# Patient Record
Sex: MALE | Race: WHITE | NOT HISPANIC OR LATINO | Employment: FULL TIME | URBAN - METROPOLITAN AREA
[De-identification: names, ages, dates, MRNs, and addresses within clinical notes are randomized per-mention and may not be internally consistent; named-entity substitution may affect disease eponyms.]

---

## 2020-11-03 ENCOUNTER — OCCMED (OUTPATIENT)
Dept: URGENT CARE | Facility: CLINIC | Age: 55
End: 2020-11-03

## 2020-11-03 DIAGNOSIS — Z11.59 SCREENING FOR VIRAL DISEASE: Primary | ICD-10-CM

## 2020-11-03 PROCEDURE — U0003 INFECTIOUS AGENT DETECTION BY NUCLEIC ACID (DNA OR RNA); SEVERE ACUTE RESPIRATORY SYNDROME CORONAVIRUS 2 (SARS-COV-2) (CORONAVIRUS DISEASE [COVID-19]), AMPLIFIED PROBE TECHNIQUE, MAKING USE OF HIGH THROUGHPUT TECHNOLOGIES AS DESCRIBED BY CMS-2020-01-R: HCPCS

## 2020-11-05 LAB — SARS-COV-2 RNA SPEC QL NAA+PROBE: NOT DETECTED

## 2023-03-22 ENCOUNTER — OCCMED (OUTPATIENT)
Dept: URGENT CARE | Facility: CLINIC | Age: 58
End: 2023-03-22

## 2023-03-22 ENCOUNTER — APPOINTMENT (OUTPATIENT)
Dept: RADIOLOGY | Facility: CLINIC | Age: 58
End: 2023-03-22

## 2023-03-22 DIAGNOSIS — M79.605 LEFT LEG PAIN: ICD-10-CM

## 2023-03-22 DIAGNOSIS — M79.662 PAIN OF LEFT CALF: ICD-10-CM

## 2023-03-22 DIAGNOSIS — M79.605 LEFT LEG PAIN: Primary | ICD-10-CM

## 2023-03-29 ENCOUNTER — OCCMED (OUTPATIENT)
Dept: URGENT CARE | Facility: CLINIC | Age: 58
End: 2023-03-29

## 2023-03-29 DIAGNOSIS — M79.605 LEFT LEG PAIN: Primary | ICD-10-CM

## 2024-07-13 ENCOUNTER — APPOINTMENT (EMERGENCY)
Dept: CT IMAGING | Facility: HOSPITAL | Age: 59
DRG: 696 | End: 2024-07-13
Payer: COMMERCIAL

## 2024-07-13 ENCOUNTER — HOSPITAL ENCOUNTER (INPATIENT)
Facility: HOSPITAL | Age: 59
LOS: 1 days | Discharge: HOME | DRG: 696 | End: 2024-07-16
Attending: EMERGENCY MEDICINE | Admitting: FAMILY MEDICINE
Payer: COMMERCIAL

## 2024-07-13 DIAGNOSIS — R33.9 URINARY RETENTION: ICD-10-CM

## 2024-07-13 DIAGNOSIS — R31.9 HEMATURIA: Primary | ICD-10-CM

## 2024-07-13 DIAGNOSIS — N39.0 UTI (URINARY TRACT INFECTION): ICD-10-CM

## 2024-07-13 DIAGNOSIS — F17.200 TOBACCO USE DISORDER: ICD-10-CM

## 2024-07-13 DIAGNOSIS — R10.32 LEFT LOWER QUADRANT ABDOMINAL PAIN: ICD-10-CM

## 2024-07-13 PROBLEM — R33.8 BENIGN PROSTATIC HYPERPLASIA WITH URINARY RETENTION: Status: ACTIVE | Noted: 2024-07-13

## 2024-07-13 PROBLEM — E78.00 PURE HYPERCHOLESTEROLEMIA: Status: ACTIVE | Noted: 2023-06-13

## 2024-07-13 PROBLEM — Z95.5 PRESENCE OF STENT IN CORONARY ARTERY: Status: ACTIVE | Noted: 2020-07-01

## 2024-07-13 PROBLEM — R31.0 GROSS HEMATURIA: Status: ACTIVE | Noted: 2023-07-01

## 2024-07-13 PROBLEM — R10.9 ACUTE LEFT FLANK PAIN: Status: ACTIVE | Noted: 2023-07-01

## 2024-07-13 PROBLEM — E55.9 VITAMIN D DEFICIENCY: Status: ACTIVE | Noted: 2020-07-02

## 2024-07-13 PROBLEM — N40.1 BENIGN PROSTATIC HYPERPLASIA WITH URINARY RETENTION: Status: ACTIVE | Noted: 2024-07-13

## 2024-07-13 PROBLEM — I25.5 ISCHEMIC CARDIOMYOPATHY: Status: ACTIVE | Noted: 2018-10-29

## 2024-07-13 PROBLEM — I50.42 CHRONIC COMBINED SYSTOLIC AND DIASTOLIC HEART FAILURE (HCC): Status: ACTIVE | Noted: 2022-11-29

## 2024-07-13 LAB
ABO GROUP BLD: NORMAL
ABO GROUP BLD: NORMAL
ALBUMIN SERPL BCG-MCNC: 4.5 G/DL (ref 3.5–5)
ALP SERPL-CCNC: 59 U/L (ref 34–104)
ALT SERPL W P-5'-P-CCNC: 12 U/L (ref 7–52)
ANION GAP SERPL CALCULATED.3IONS-SCNC: 6 MMOL/L (ref 4–13)
ANION GAP SERPL CALCULATED.3IONS-SCNC: 9 MMOL/L (ref 4–13)
APTT PPP: 27 SECONDS (ref 23–37)
AST SERPL W P-5'-P-CCNC: 13 U/L (ref 13–39)
BACTERIA UR QL AUTO: ABNORMAL /HPF
BASOPHILS # BLD AUTO: 0.04 THOUSANDS/ÂΜL (ref 0–0.1)
BASOPHILS NFR BLD AUTO: 0 % (ref 0–1)
BILIRUB SERPL-MCNC: 0.39 MG/DL (ref 0.2–1)
BILIRUB UR QL STRIP: NEGATIVE
BLD GP AB SCN SERPL QL: NEGATIVE
BUN SERPL-MCNC: 18 MG/DL (ref 5–25)
BUN SERPL-MCNC: 19 MG/DL (ref 5–25)
CALCIUM SERPL-MCNC: 8.6 MG/DL (ref 8.4–10.2)
CALCIUM SERPL-MCNC: 9.2 MG/DL (ref 8.4–10.2)
CHLORIDE SERPL-SCNC: 105 MMOL/L (ref 96–108)
CHLORIDE SERPL-SCNC: 110 MMOL/L (ref 96–108)
CLARITY UR: ABNORMAL
CO2 SERPL-SCNC: 23 MMOL/L (ref 21–32)
CO2 SERPL-SCNC: 23 MMOL/L (ref 21–32)
COLOR UR: ABNORMAL
CREAT SERPL-MCNC: 0.94 MG/DL (ref 0.6–1.3)
CREAT SERPL-MCNC: 0.95 MG/DL (ref 0.6–1.3)
EOSINOPHIL # BLD AUTO: 0.23 THOUSAND/ÂΜL (ref 0–0.61)
EOSINOPHIL NFR BLD AUTO: 3 % (ref 0–6)
ERYTHROCYTE [DISTWIDTH] IN BLOOD BY AUTOMATED COUNT: 12.4 % (ref 11.6–15.1)
ERYTHROCYTE [DISTWIDTH] IN BLOOD BY AUTOMATED COUNT: 12.5 % (ref 11.6–15.1)
GFR SERPL CREATININE-BSD FRML MDRD: 87 ML/MIN/1.73SQ M
GFR SERPL CREATININE-BSD FRML MDRD: 88 ML/MIN/1.73SQ M
GLUCOSE SERPL-MCNC: 143 MG/DL (ref 65–140)
GLUCOSE SERPL-MCNC: 160 MG/DL (ref 65–140)
GLUCOSE SERPL-MCNC: 162 MG/DL (ref 65–140)
GLUCOSE SERPL-MCNC: 171 MG/DL (ref 65–140)
GLUCOSE SERPL-MCNC: 243 MG/DL (ref 65–140)
GLUCOSE UR STRIP-MCNC: ABNORMAL MG/DL
HCT VFR BLD AUTO: 36.9 % (ref 36.5–49.3)
HCT VFR BLD AUTO: 42.4 % (ref 36.5–49.3)
HGB BLD-MCNC: 12.5 G/DL (ref 12–17)
HGB BLD-MCNC: 14.2 G/DL (ref 12–17)
HGB UR QL STRIP.AUTO: ABNORMAL
IMM GRANULOCYTES # BLD AUTO: 0.02 THOUSAND/UL (ref 0–0.2)
IMM GRANULOCYTES NFR BLD AUTO: 0 % (ref 0–2)
INR PPP: 0.91 (ref 0.84–1.19)
KETONES UR STRIP-MCNC: ABNORMAL MG/DL
LACTATE SERPL-SCNC: 2 MMOL/L (ref 0.5–2)
LEUKOCYTE ESTERASE UR QL STRIP: ABNORMAL
LIPASE SERPL-CCNC: 44 U/L (ref 11–82)
LYMPHOCYTES # BLD AUTO: 3.47 THOUSANDS/ÂΜL (ref 0.6–4.47)
LYMPHOCYTES NFR BLD AUTO: 37 % (ref 14–44)
MCH RBC QN AUTO: 29.8 PG (ref 26.8–34.3)
MCH RBC QN AUTO: 30 PG (ref 26.8–34.3)
MCHC RBC AUTO-ENTMCNC: 33.5 G/DL (ref 31.4–37.4)
MCHC RBC AUTO-ENTMCNC: 33.9 G/DL (ref 31.4–37.4)
MCV RBC AUTO: 89 FL (ref 82–98)
MCV RBC AUTO: 89 FL (ref 82–98)
MONOCYTES # BLD AUTO: 0.68 THOUSAND/ÂΜL (ref 0.17–1.22)
MONOCYTES NFR BLD AUTO: 7 % (ref 4–12)
NEUTROPHILS # BLD AUTO: 4.93 THOUSANDS/ÂΜL (ref 1.85–7.62)
NEUTS SEG NFR BLD AUTO: 53 % (ref 43–75)
NITRITE UR QL STRIP: POSITIVE
NON-SQ EPI CELLS URNS QL MICRO: ABNORMAL /HPF
NRBC BLD AUTO-RTO: 0 /100 WBCS
PH UR STRIP.AUTO: 7 [PH]
PLATELET # BLD AUTO: 159 THOUSANDS/UL (ref 149–390)
PLATELET # BLD AUTO: 209 THOUSANDS/UL (ref 149–390)
PMV BLD AUTO: 10.5 FL (ref 8.9–12.7)
PMV BLD AUTO: 11.1 FL (ref 8.9–12.7)
POTASSIUM SERPL-SCNC: 3.9 MMOL/L (ref 3.5–5.3)
POTASSIUM SERPL-SCNC: 4 MMOL/L (ref 3.5–5.3)
PROT SERPL-MCNC: 7.4 G/DL (ref 6.4–8.4)
PROT UR STRIP-MCNC: >=300 MG/DL
PROTHROMBIN TIME: 12.6 SECONDS (ref 11.6–14.5)
RBC # BLD AUTO: 4.17 MILLION/UL (ref 3.88–5.62)
RBC # BLD AUTO: 4.76 MILLION/UL (ref 3.88–5.62)
RBC #/AREA URNS AUTO: ABNORMAL /HPF
RH BLD: POSITIVE
RH BLD: POSITIVE
SODIUM SERPL-SCNC: 137 MMOL/L (ref 135–147)
SODIUM SERPL-SCNC: 139 MMOL/L (ref 135–147)
SP GR UR STRIP.AUTO: 1.01 (ref 1–1.03)
SPECIMEN EXPIRATION DATE: NORMAL
UROBILINOGEN UR QL STRIP.AUTO: >=8 E.U./DL
WBC # BLD AUTO: 10.44 THOUSAND/UL (ref 4.31–10.16)
WBC # BLD AUTO: 9.37 THOUSAND/UL (ref 4.31–10.16)
WBC #/AREA URNS AUTO: ABNORMAL /HPF

## 2024-07-13 PROCEDURE — 80053 COMPREHEN METABOLIC PANEL: CPT | Performed by: EMERGENCY MEDICINE

## 2024-07-13 PROCEDURE — 82948 REAGENT STRIP/BLOOD GLUCOSE: CPT

## 2024-07-13 PROCEDURE — 74176 CT ABD & PELVIS W/O CONTRAST: CPT

## 2024-07-13 PROCEDURE — 96365 THER/PROPH/DIAG IV INF INIT: CPT

## 2024-07-13 PROCEDURE — 0T9B70Z DRAINAGE OF BLADDER WITH DRAINAGE DEVICE, VIA NATURAL OR ARTIFICIAL OPENING: ICD-10-PCS | Performed by: EMERGENCY MEDICINE

## 2024-07-13 PROCEDURE — 85610 PROTHROMBIN TIME: CPT | Performed by: EMERGENCY MEDICINE

## 2024-07-13 PROCEDURE — 96361 HYDRATE IV INFUSION ADD-ON: CPT

## 2024-07-13 PROCEDURE — 86850 RBC ANTIBODY SCREEN: CPT | Performed by: EMERGENCY MEDICINE

## 2024-07-13 PROCEDURE — 36415 COLL VENOUS BLD VENIPUNCTURE: CPT | Performed by: EMERGENCY MEDICINE

## 2024-07-13 PROCEDURE — 96376 TX/PRO/DX INJ SAME DRUG ADON: CPT

## 2024-07-13 PROCEDURE — 99222 1ST HOSP IP/OBS MODERATE 55: CPT | Performed by: FAMILY MEDICINE

## 2024-07-13 PROCEDURE — 86901 BLOOD TYPING SEROLOGIC RH(D): CPT | Performed by: EMERGENCY MEDICINE

## 2024-07-13 PROCEDURE — 99284 EMERGENCY DEPT VISIT MOD MDM: CPT

## 2024-07-13 PROCEDURE — 83690 ASSAY OF LIPASE: CPT | Performed by: EMERGENCY MEDICINE

## 2024-07-13 PROCEDURE — 81001 URINALYSIS AUTO W/SCOPE: CPT | Performed by: EMERGENCY MEDICINE

## 2024-07-13 PROCEDURE — 80048 BASIC METABOLIC PNL TOTAL CA: CPT | Performed by: FAMILY MEDICINE

## 2024-07-13 PROCEDURE — 99285 EMERGENCY DEPT VISIT HI MDM: CPT | Performed by: EMERGENCY MEDICINE

## 2024-07-13 PROCEDURE — 86900 BLOOD TYPING SEROLOGIC ABO: CPT | Performed by: EMERGENCY MEDICINE

## 2024-07-13 PROCEDURE — 99222 1ST HOSP IP/OBS MODERATE 55: CPT

## 2024-07-13 PROCEDURE — 85730 THROMBOPLASTIN TIME PARTIAL: CPT | Performed by: EMERGENCY MEDICINE

## 2024-07-13 PROCEDURE — 85027 COMPLETE CBC AUTOMATED: CPT | Performed by: FAMILY MEDICINE

## 2024-07-13 PROCEDURE — 87086 URINE CULTURE/COLONY COUNT: CPT | Performed by: EMERGENCY MEDICINE

## 2024-07-13 PROCEDURE — 83605 ASSAY OF LACTIC ACID: CPT | Performed by: EMERGENCY MEDICINE

## 2024-07-13 PROCEDURE — 96375 TX/PRO/DX INJ NEW DRUG ADDON: CPT

## 2024-07-13 PROCEDURE — 85025 COMPLETE CBC W/AUTO DIFF WBC: CPT | Performed by: EMERGENCY MEDICINE

## 2024-07-13 RX ORDER — MORPHINE SULFATE 4 MG/ML
4 INJECTION, SOLUTION INTRAMUSCULAR; INTRAVENOUS EVERY 4 HOURS PRN
Status: DISCONTINUED | OUTPATIENT
Start: 2024-07-13 | End: 2024-07-16 | Stop reason: HOSPADM

## 2024-07-13 RX ORDER — OXYCODONE HYDROCHLORIDE 5 MG/1
5 TABLET ORAL EVERY 6 HOURS PRN
Status: DISCONTINUED | OUTPATIENT
Start: 2024-07-13 | End: 2024-07-16 | Stop reason: HOSPADM

## 2024-07-13 RX ORDER — ASPIRIN 81 MG/1
81 TABLET ORAL 2 TIMES DAILY
COMMUNITY
End: 2024-07-16

## 2024-07-13 RX ORDER — CEFAZOLIN SODIUM 1 G/50ML
1000 SOLUTION INTRAVENOUS ONCE
Status: COMPLETED | OUTPATIENT
Start: 2024-07-13 | End: 2024-07-13

## 2024-07-13 RX ORDER — FINASTERIDE 5 MG/1
5 TABLET, FILM COATED ORAL DAILY
Status: DISCONTINUED | OUTPATIENT
Start: 2024-07-13 | End: 2024-07-16 | Stop reason: HOSPADM

## 2024-07-13 RX ORDER — GABAPENTIN 100 MG/1
100 CAPSULE ORAL DAILY
COMMUNITY
Start: 2024-04-23

## 2024-07-13 RX ORDER — INSULIN LISPRO 100 [IU]/ML
1-6 INJECTION, SOLUTION INTRAVENOUS; SUBCUTANEOUS
Status: DISCONTINUED | OUTPATIENT
Start: 2024-07-13 | End: 2024-07-16 | Stop reason: HOSPADM

## 2024-07-13 RX ORDER — CARVEDILOL 25 MG/1
25 TABLET ORAL 2 TIMES DAILY WITH MEALS
COMMUNITY
Start: 2024-04-23

## 2024-07-13 RX ORDER — MORPHINE SULFATE 4 MG/ML
4 INJECTION, SOLUTION INTRAMUSCULAR; INTRAVENOUS ONCE
Status: COMPLETED | OUTPATIENT
Start: 2024-07-13 | End: 2024-07-13

## 2024-07-13 RX ORDER — TAMSULOSIN HYDROCHLORIDE 0.4 MG/1
0.4 CAPSULE ORAL ONCE
Status: COMPLETED | OUTPATIENT
Start: 2024-07-13 | End: 2024-07-13

## 2024-07-13 RX ORDER — CARVEDILOL 12.5 MG/1
25 TABLET ORAL 2 TIMES DAILY WITH MEALS
Status: DISCONTINUED | OUTPATIENT
Start: 2024-07-13 | End: 2024-07-16 | Stop reason: HOSPADM

## 2024-07-13 RX ORDER — GLYBURIDE 2.5 MG/1
2.5 TABLET ORAL
COMMUNITY

## 2024-07-13 RX ORDER — ACETAMINOPHEN 325 MG/1
650 TABLET ORAL EVERY 6 HOURS PRN
Status: DISCONTINUED | OUTPATIENT
Start: 2024-07-13 | End: 2024-07-16 | Stop reason: HOSPADM

## 2024-07-13 RX ORDER — ATORVASTATIN CALCIUM 40 MG/1
40 TABLET, FILM COATED ORAL
Status: DISCONTINUED | OUTPATIENT
Start: 2024-07-13 | End: 2024-07-16 | Stop reason: HOSPADM

## 2024-07-13 RX ORDER — ONDANSETRON 2 MG/ML
4 INJECTION INTRAMUSCULAR; INTRAVENOUS ONCE
Status: COMPLETED | OUTPATIENT
Start: 2024-07-13 | End: 2024-07-13

## 2024-07-13 RX ORDER — SPIRONOLACTONE 25 MG/1
25 TABLET ORAL DAILY
Status: DISCONTINUED | OUTPATIENT
Start: 2024-07-13 | End: 2024-07-16 | Stop reason: HOSPADM

## 2024-07-13 RX ORDER — NICOTINE 21 MG/24HR
1 PATCH, TRANSDERMAL 24 HOURS TRANSDERMAL DAILY
Status: DISCONTINUED | OUTPATIENT
Start: 2024-07-13 | End: 2024-07-16 | Stop reason: HOSPADM

## 2024-07-13 RX ORDER — SILDENAFIL 25 MG/1
25 TABLET, FILM COATED ORAL DAILY PRN
COMMUNITY
Start: 2024-04-23

## 2024-07-13 RX ORDER — SPIRONOLACTONE 25 MG/1
25 TABLET ORAL DAILY
COMMUNITY
Start: 2024-04-23

## 2024-07-13 RX ORDER — SACUBITRIL AND VALSARTAN 49; 51 MG/1; MG/1
1 TABLET, FILM COATED ORAL 2 TIMES DAILY
COMMUNITY
Start: 2024-04-23

## 2024-07-13 RX ORDER — ATORVASTATIN CALCIUM 40 MG/1
40 TABLET, FILM COATED ORAL DAILY
COMMUNITY
Start: 2024-04-23

## 2024-07-13 RX ADMIN — INSULIN LISPRO 1 UNITS: 100 INJECTION, SOLUTION INTRAVENOUS; SUBCUTANEOUS at 17:07

## 2024-07-13 RX ADMIN — SACUBITRIL AND VALSARTAN 1 TABLET: 49; 51 TABLET, FILM COATED ORAL at 10:16

## 2024-07-13 RX ADMIN — SODIUM CHLORIDE 1000 ML: 0.9 INJECTION, SOLUTION INTRAVENOUS at 01:04

## 2024-07-13 RX ADMIN — OXYCODONE HYDROCHLORIDE 5 MG: 5 TABLET ORAL at 12:23

## 2024-07-13 RX ADMIN — FINASTERIDE 5 MG: 5 TABLET, FILM COATED ORAL at 10:15

## 2024-07-13 RX ADMIN — OXYCODONE HYDROCHLORIDE 5 MG: 5 TABLET ORAL at 06:11

## 2024-07-13 RX ADMIN — TAMSULOSIN HYDROCHLORIDE 0.4 MG: 0.4 CAPSULE ORAL at 04:53

## 2024-07-13 RX ADMIN — INSULIN LISPRO 1 UNITS: 100 INJECTION, SOLUTION INTRAVENOUS; SUBCUTANEOUS at 11:59

## 2024-07-13 RX ADMIN — CEFAZOLIN SODIUM 1000 MG: 1 SOLUTION INTRAVENOUS at 03:59

## 2024-07-13 RX ADMIN — MORPHINE SULFATE 4 MG: 4 INJECTION INTRAVENOUS at 03:58

## 2024-07-13 RX ADMIN — CARVEDILOL 25 MG: 12.5 TABLET, FILM COATED ORAL at 10:14

## 2024-07-13 RX ADMIN — ONDANSETRON 4 MG: 2 INJECTION INTRAMUSCULAR; INTRAVENOUS at 01:21

## 2024-07-13 RX ADMIN — MORPHINE SULFATE 4 MG: 4 INJECTION INTRAVENOUS at 01:05

## 2024-07-13 RX ADMIN — SPIRONOLACTONE 25 MG: 25 TABLET ORAL at 10:16

## 2024-07-13 RX ADMIN — OXYCODONE HYDROCHLORIDE 5 MG: 5 TABLET ORAL at 20:39

## 2024-07-13 RX ADMIN — ATORVASTATIN CALCIUM 40 MG: 40 TABLET, FILM COATED ORAL at 17:05

## 2024-07-13 RX ADMIN — MORPHINE SULFATE 4 MG: 4 INJECTION INTRAVENOUS at 02:18

## 2024-07-13 NOTE — ED PROVIDER NOTES
History  Chief Complaint   Patient presents with    Blood in Urine     Pt reports peeing blood clots and having urinary retention      Patient is a 59-year-old male history of CHF diabetes hypertension prior episodes of hematuria of unknown cause on aspirin daily no anticoagulants no prior prostate or urologic surgery.  Patient reports has had blood in the urine for the past 6 days.  Tonight developed left lower abdominal pain and left lower back pain and difficulty urinating patient was able to pass urine which is grossly bloody on arrival in the emergency department and felt significantly improved.        History provided by:  Patient      None       Past Medical History:   Diagnosis Date    CHF (congestive heart failure) (HCC)     DM (diabetes mellitus) (HCC)     HTN (hypertension)        Past Surgical History:   Procedure Laterality Date    CARDIAC SURGERY         History reviewed. No pertinent family history.  I have reviewed and agree with the history as documented.    E-Cigarette/Vaping     E-Cigarette/Vaping Substances     Social History     Tobacco Use    Smoking status: Never    Smokeless tobacco: Current     Types: Chew   Substance Use Topics    Alcohol use: Not Currently    Drug use: Never       Review of Systems   Constitutional:  Negative for chills and fever.   HENT:  Negative for congestion.    Respiratory:  Negative for cough and shortness of breath.    Cardiovascular:  Negative for chest pain.   Gastrointestinal:  Positive for abdominal pain and nausea. Negative for diarrhea and vomiting.   Genitourinary:  Positive for flank pain and hematuria.   Neurological:  Negative for weakness, numbness and headaches.   All other systems reviewed and are negative.      Physical Exam  Physical Exam  Vitals and nursing note reviewed.   Constitutional:       General: He is not in acute distress.     Appearance: Normal appearance.   HENT:      Head: Normocephalic and atraumatic.      Nose: Nose normal.   Eyes:       Conjunctiva/sclera: Conjunctivae normal.   Pulmonary:      Effort: Pulmonary effort is normal. No respiratory distress.   Abdominal:      Tenderness: There is abdominal tenderness in the suprapubic area and left lower quadrant. There is no right CVA tenderness, left CVA tenderness, guarding or rebound.   Skin:     General: Skin is dry.   Neurological:      General: No focal deficit present.      Mental Status: He is alert and oriented to person, place, and time.         Vital Signs  ED Triage Vitals [07/13/24 0046]   Temperature Pulse Respirations Blood Pressure SpO2   (!) 97.3 °F (36.3 °C) (!) 112 20 (!) 162/107 97 %      Temp Source Heart Rate Source Patient Position - Orthostatic VS BP Location FiO2 (%)   Temporal Monitor Sitting Left arm --      Pain Score       10 - Worst Possible Pain           Vitals:    07/13/24 0046 07/13/24 0334 07/13/24 0345   BP: (!) 162/107 125/79 125/79   Pulse: (!) 112 81 81   Patient Position - Orthostatic VS: Sitting Lying Lying         Visual Acuity      ED Medications  Medications   ceFAZolin (ANCEF) IVPB (premix in dextrose) 1,000 mg 50 mL (1,000 mg Intravenous New Bag 7/13/24 0359)   tamsulosin (FLOMAX) capsule 0.4 mg (has no administration in time range)   finasteride (PROSCAR) tablet 5 mg (has no administration in time range)   sodium chloride 0.9 % bolus 1,000 mL (0 mL Intravenous Stopped 7/13/24 0217)   morphine injection 4 mg (4 mg Intravenous Given 7/13/24 0105)   ondansetron (ZOFRAN) injection 4 mg (4 mg Intravenous Given 7/13/24 0121)   morphine injection 4 mg (4 mg Intravenous Given 7/13/24 0218)   morphine injection 4 mg (4 mg Intravenous Given 7/13/24 0358)       Diagnostic Studies  Results Reviewed       Procedure Component Value Units Date/Time    Comprehensive metabolic panel [[id number]]  (Abnormal) Collected: 07/13/24 0111    Lab Status: Final result Specimen: Blood from Arm, Right Updated: 07/13/24 0143     Sodium 137 mmol/L      Potassium 4.0 mmol/L       Chloride 105 mmol/L      CO2 23 mmol/L      ANION GAP 9 mmol/L      BUN 19 mg/dL      Creatinine 0.94 mg/dL      Glucose 243 mg/dL      Calcium 9.2 mg/dL      AST 13 U/L      ALT 12 U/L      Alkaline Phosphatase 59 U/L      Total Protein 7.4 g/dL      Albumin 4.5 g/dL      Total Bilirubin 0.39 mg/dL      eGFR 88 ml/min/1.73sq m     Narrative:      [employer] guidelines for Chronic Kidney Disease (CKD):     Stage 1 with normal or high GFR (GFR > 90 mL/min/1.73 square meters)    Stage 2 Mild CKD (GFR = 60-89 mL/min/1.73 square meters)    Stage 3A Moderate CKD (GFR = 45-59 mL/min/1.73 square meters)    Stage 3B Moderate CKD (GFR = 30-44 mL/min/1.73 square meters)    Stage 4 Severe CKD (GFR = 15-29 mL/min/1.73 square meters)    Stage 5 End Stage CKD (GFR <15 mL/min/1.73 square meters)  Note: GFR calculation is accurate only with a steady state creatinine    Lipase [[id number]]  (Normal) Collected: 07/13/24 0111    Lab Status: Final result Specimen: Blood from Arm, Right Updated: 07/13/24 0143     Lipase 44 u/L     Lactic acid, plasma (w/reflex if result > 2.0) [[id number]]  (Normal) Collected: 07/13/24 0111    Lab Status: Final result Specimen: Blood from Arm, Right Updated: 07/13/24 0143     LACTIC ACID 2.0 mmol/L     Narrative:      Result may be elevated if tourniquet was used during collection.    Protime-INR [[id number]]  (Normal) Collected: 07/13/24 0111    Lab Status: Final result Specimen: Blood from Arm, Right Updated: 07/13/24 0136     Protime 12.6 seconds      INR 0.91    APTT [[id number]]  (Normal) Collected: 07/13/24 0111    Lab Status: Final result Specimen: Blood from Arm, Right Updated: 07/13/24 0136     PTT 27 seconds     Urine Microscopic [[id number]]  (Abnormal) Collected: 07/13/24 0108    Lab Status: Final result Specimen: Urine, Clean Catch Updated: 07/13/24 0136     RBC, UA Innumerable /hpf      WBC, UA       Field obscured, unable to enumerate     /hpf     Epithelial Cells        Field obscured, unable to enumerate     /hpf     Bacteria, UA       Field obscured, unable to enumerate     /hpf    Urine culture [[id number]] Collected: 07/13/24 0108    Lab Status: In process Specimen: Urine, Clean Catch Updated: 07/13/24 0136    UA w Reflex to Microscopic w Reflex to Culture [[id number]]  (Abnormal) Collected: 07/13/24 0108    Lab Status: Final result Specimen: Urine, Clean Catch Updated: 07/13/24 0123     Color, UA Red     Clarity, UA Cloudy     Specific Gravity, UA 1.015     pH, UA 7.0     Leukocytes, UA Moderate     Nitrite, UA Positive     Protein, UA >=300 mg/dl      Glucose, UA >=1000 (1%) mg/dl      Ketones, UA 15 (1+) mg/dl      Urobilinogen, UA >=8.0 E.U./dl      Bilirubin, UA Negative     Occult Blood, UA Large    CBC and differential [[id number]] Collected: 07/13/24 0111    Lab Status: Final result Specimen: Blood from Arm, Right Updated: 07/13/24 0120     WBC 9.37 Thousand/uL      RBC 4.76 Million/uL      Hemoglobin 14.2 g/dL      Hematocrit 42.4 %      MCV 89 fL      MCH 29.8 pg      MCHC 33.5 g/dL      RDW 12.4 %      MPV 11.1 fL      Platelets 209 Thousands/uL      nRBC 0 /100 WBCs      Segmented % 53 %      Immature Grans % 0 %      Lymphocytes % 37 %      Monocytes % 7 %      Eosinophils Relative 3 %      Basophils Relative 0 %      Absolute Neutrophils 4.93 Thousands/µL      Absolute Immature Grans 0.02 Thousand/uL      Absolute Lymphocytes 3.47 Thousands/µL      Absolute Monocytes 0.68 Thousand/µL      Eosinophils Absolute 0.23 Thousand/µL      Basophils Absolute 0.04 Thousands/µL                    CT abdomen pelvis wo contrast    (Results Pending)              Procedures  Procedures         ED Course  ED Course as of 07/13/24 0423   Sat Jul 13, 2024   0407 Spoke with urology advanced practitioner on-call Ziyad I reviewed case and findings in the emergency department advised to irrigate bladder with CBI for now and treat UTI with antibiotics and admit for pain control.     0422  Spoke with Dr. Snider hospitalist on call reviewed case findings and urology recommendations.  Accepts for obs.                                   SBIRT 20yo+      Flowsheet Row Most Recent Value   Initial Alcohol Screen: US AUDIT-C     1. How often do you have a drink containing alcohol? 0 Filed at: 07/13/2024 0045   2. How many drinks containing alcohol do you have on a typical day you are drinking?  0 Filed at: 07/13/2024 0045   3a. Male UNDER 65: How often do you have five or more drinks on one occasion? 0 Filed at: 07/13/2024 0045   3b. FEMALE Any Age, or MALE 65+: How often do you have 4 or more drinks on one occassion? 0 Filed at: 07/13/2024 0045   Audit-C Score 0 Filed at: 07/13/2024 0045   [place]: How many times in the past year have you...    Used an illegal drug or used a prescription medication for non-medical reasons? Never Filed at: 07/13/2024 0045                      Medical Decision Making  Problems Addressed:  Hematuria: acute illness or injury  Left lower quadrant abdominal pain: acute illness or injury  Urinary retention: acute illness or injury  UTI (urinary tract infection): acute illness or injury    Amount and/or Complexity of Data Reviewed  Labs: ordered. Decision-making details documented in ED Course.  Radiology: ordered. Decision-making details documented in ED Course.    Risk  Prescription drug management.  Decision regarding hospitalization.                 Disposition  Final diagnoses:   Hematuria   Urinary retention   Left lower quadrant abdominal pain   UTI (urinary tract infection)     Time reflects when diagnosis was documented in both MDM as applicable and the Disposition within this note       Time User Action Codes Description Comment    7/13/2024  1:44 AM Paolo Gallagher Add [R31.9] Hematuria     7/13/2024  1:44 AM Paolo Gallagher Add [R33.9] Urinary retention     7/13/2024  4:09 AM Paolo Gallagher Add [R10.32] Left lower quadrant abdominal pain     7/13/2024  4:20 AM Paolo Gallagher Add  [N39.0] UTI (urinary tract infection)           ED Disposition       ED Disposition   Admit    Condition   Stable    Date/Time   Sat Jul 13, 2024 4032    Comment   Case was discussed with Dr. Snider and the patient's admission status was agreed to be Admission Status: observation status to the service of Dr. Snider.               Follow-up Information    None         Patient's Medications    No medications on file       No discharge procedures on file.    PDMP Review       None            ED Provider  Electronically Signed by             Paolo Gallagher DO  07/13/24 0423

## 2024-07-13 NOTE — QUICK NOTE
[facility]  Interim Progress Note  Name: Jw Ray I  MRN: [id number]  Unit/Bed#: -01 I Date of Admission: 7/13/2024   Date of Service: 7/13/2024 I Hospital Day: 0    Assessment & Plan   Gross hematuria  Assessment & Plan  History of gross hematuria with an extensive outpatient workup that was negative.  CT urogram and cystoscopy on outpatient revealed no abnormalities, follows with [facility] urology    Brewer catheter in place, now with a light pink urine  Continue manual irrigation  Proscar and Flomax started  Consult urology  Patient received a dose of Ancef in the ER      Benign prostatic hyperplasia with urinary retention  Assessment & Plan  Flomax and Proscar    Type 2 diabetes mellitus (HCC)  Assessment & Plan  Will place the patient on insulin sliding scale  Home regimen is glyburide/metformin and Jardiance    Lab Results   Component Value Date    HGBA1C 9.1 (H) 01/10/2024       Pure hypercholesterolemia  Assessment & Plan  Continue statin    Essential hypertension  Assessment & Plan  Continue Coreg, Entresto and Aldactone    Coronary artery disease due to lipid rich plaque  Assessment & Plan  Withhold aspirin for now due to hematuria  He does have a drug-eluting stent to the proximal segment of the OM 3.    Chronic combined systolic and diastolic heart failure (HCC)  Assessment & Plan  Follows with [place] [facility]  Continue Entresto, Coreg and Aldactone    Wt Readings from Last 3 Encounters:   07/13/24 84.1 kg (185 lb 6.5 oz)       Acute left flank pain  Assessment & Plan  Secondary to distended bladder.  Continue with pain medicine, improving since Brewer catheter placement           Patient seen and examined. Urine is more yellow in brewer catheter. Awaiting urology consult. Patient does report history of kidney stones, awaiting CT abdomen results. Having some left sided flank pain currently.     CT showed:  High density contents of the left renal collecting system  and proximal left ureter indicate hematuria.     Numerous predominately subcentimeter bilateral renal cortical lesions likely to represent hemorrhagic cysts. These were described on prior outside CTs.     Prostamegaly protruding into the bladder base with prominent bladder distention. No bladder wall thickening.

## 2024-07-13 NOTE — PLAN OF CARE

## 2024-07-13 NOTE — ASSESSMENT & PLAN NOTE
Follows with [place] cardiology  Continue Entresto, Coreg and Aldactone    Wt Readings from Last 3 Encounters:   07/13/24 84.1 kg (185 lb 6.5 oz)

## 2024-07-13 NOTE — ED NOTES
Bladder irrigation performed at this time. Pale red urine noted on return with minimal clots. Patient voiced no discomfort.      Giovanni Lujan RN  07/13/24 [id number]

## 2024-07-13 NOTE — CONSULTS
Consultation - Urology   Jw Ray 59 y.o. male MRN: [id number]  Unit/Bed#: -[facility] Encounter: [id number]    Assessment:  59 y.o. male with gross hematuria, hx of same, follows with [facility]    CTAP IMPRESSION:  - High density contents of the left renal collecting system and proximal left ureter indicate hematuria.  - Numerous predominately subcentimeter bilateral renal cortical lesions likely to represent hemorrhagic cysts. These were described on prior outside CTs.  - Prostamegaly protruding into the bladder base with prominent bladder distention. No bladder wall thickening.    - Afebrile, VSS on room air  - WBC 10 (9)  - Hgb 12 (14)  - BMP WNL  - Lactic acid 2.0     - Continues to have some left sided back discomfort but otherwise resting comfortably   - Urine is clear without clot or sediment     - UA with moderate leuks, positive nitrites, ketones - microscopic field obscured due to hematuria - culture pending    - PMHx BPH, CHF, CAD w/ drug-eluting stent - only on daily ASA, HTN, HLD, DM2,     Plan: Thoroughly discussed with Priscilla Serrato, Urology CRNP   - Patient known to [facility] for gross hematuria with complete work up previously negative  - 3-way catheter in place and with manual irrigation there has been complete resolution of hematuria  - On CT there is noted to be some blood product in left collection system without actual collection which would warrant further investigation, possible inspection  - At this time patient and wife are hopeful patient would be able to discharge tonight  - Discussed need for monitoring urine overnight and possible discharge with brewer. Patient is clear he will not be going home with brewer in place - would require void trial prior to discharge second to concern for retention on arrival. Possible void trial tomorrow.  - Proscar/Flomax  - Trend H&H and urine  - Ultimately despite need for further investigation patient is stable and likely could  discharge tomorrow from urologic standpoint for further work up outpatient  - Patient to follow with outpatient urologist for repeat imaging to check for active extravasation, suspicious lesions, better categorize cysts and bleeding of upper tract   - From brief chart review looks like he may have never had ureteroscopy/retrograde study which may be indicated pending CT, will leave that to discretion of primary urologist     History of Present Illness   Chief Complaint: hematuria     HPI:  Jw Ray is a 59 y.o. male with past medical history significant for two prior episodes of hematuria, known cyst of left kidney, prior nephrolithiasis w/ lithotripsy and sten who initially presented to [facility] ED with complaints of hematuria. Patient states since Sunday he has noted on and off hematuria. He states there were no clots present and he was just noticing some blood tinged urine. Last night this acutely worsened when patient began to pass blood clots in his urine. He states late last night he felt he was no longer able to urinate and then began to experience b/l flank pain. Patient presented to the ED and in the ED restroom states he was finally able to pass clots that had been blocking off his stream of urine and urinated for a large amount of urine and clot. After passing this patient felt significant improvement. A brewer was placed in the ED and order placed for manual irrigations. Nurse reports all day today urine has been clear, including with irrigation. Patient denies fevers or chills, nausea or vomiting. Overall much improved aside from continuous discomfort of the left flank. Of note patient does have hx of 2 prior episodes of hematuria the first in which he passed a kidney stone and was seen in [facility], the second of which he was seen at [facility] and extensive outpatient work up has been performed without cause identified. Patient and wife are hopeful patient will be discharged soon.    Review of  Systems   Constitutional:  Negative for activity change, appetite change, chills and fever.   Respiratory:  Negative for chest tightness and shortness of breath.    Cardiovascular:  Negative for chest pain and palpitations.   Gastrointestinal:  Negative for abdominal distention, abdominal pain, constipation, diarrhea and nausea.   Genitourinary:  Positive for difficulty urinating (prior to arrival), flank pain (b/l initially, now mainly left sided) and hematuria (resolved).   Musculoskeletal:  Positive for back pain (flank).   Skin:  Negative for color change.   Neurological:  Negative for dizziness, weakness, light-headedness and headaches.   Hematological:         Third episode of hematuria without real known cause   Psychiatric/Behavioral: Negative.       Historical Information   Past Medical History:   Diagnosis Date    CHF (congestive heart failure) (HCC)     DM (diabetes mellitus) (HCC)     HTN (hypertension)      Past Surgical History:   Procedure Laterality Date    CARDIAC SURGERY       Social History   Social History     Substance and Sexual Activity   Alcohol Use Not Currently     Social History     Substance and Sexual Activity   Drug Use Never     E-Cigarette/Vaping     E-Cigarette/Vaping Substances     Social History     Tobacco Use   Smoking Status Never   Smokeless Tobacco Current    Types: Chew     Family History: History reviewed. No pertinent family history.    Meds/Allergies   all current active meds have been reviewed, current meds:   Current Facility-Administered Medications   Medication Dose Route Frequency    acetaminophen (TYLENOL) tablet 650 mg  650 mg Oral Q6H PRN    atorvastatin (LIPITOR) tablet 40 mg  40 mg Oral After Dinner    carvedilol (COREG) tablet 25 mg  25 mg Oral BID With Meals    finasteride (PROSCAR) tablet 5 mg  5 mg Oral Daily    insulin lispro (HumALOG/ADMELOG) 100 units/mL subcutaneous injection 1-6 Units  1-6 Units Subcutaneous TID AC    morphine injection 4 mg  4 mg  "Intravenous Q4H PRN    nicotine (NICODERM CQ) 14 mg/24hr TD 24 hr patch 1 patch  1 patch Transdermal Daily    oxyCODONE (ROXICODONE) IR tablet 5 mg  5 mg Oral Q6H PRN    sacubitril-valsartan (ENTRESTO) 49-51 MG per tablet 1 tablet  1 tablet Oral BID    spironolactone (ALDACTONE) tablet 25 mg  25 mg Oral Daily   , and PTA meds:   Prior to Admission Medications   Prescriptions Last Dose Informant Patient Reported? Taking?   Empagliflozin 25 MG TABS   Yes Yes   Sig: Take 25 mg by mouth daily   aspirin (ECOTRIN LOW STRENGTH) 81 mg EC tablet   Yes No   Sig: Take 81 mg by mouth 2 (two) times a day   atorvastatin (LIPITOR) 40 mg tablet   Yes Yes   Sig: Take 40 mg by mouth daily   carvedilol (COREG) 25 mg tablet   Yes Yes   Sig: Take 25 mg by mouth 2 (two) times a day with meals   gabapentin (NEURONTIN) 100 mg capsule   Yes Yes   Sig: Take 100 mg by mouth daily   glyBURIDE (DIABETA) 2.5 mg tablet   Yes No   Sig: Take 2.5 mg by mouth daily with breakfast   metFORMIN (GLUCOPHAGE) 1000 MG tablet   Yes Yes   Sig: Take 1 tablet by mouth 2 (two) times a day with meals   sacubitril-valsartan (Entresto) 49-51 MG TABS   Yes Yes   Sig: Take 1 tablet by mouth 2 (two) times a day   semaglutide, 1 mg/dose, (Ozempic, 1 MG/DOSE,) 2 mg/1.5 mL injection pen   Yes Yes   Sig: Inject 1 mg under the skin Once a week   sildenafil (VIAGRA) 25 MG tablet   Yes Yes   Sig: Take 25 mg by mouth daily as needed   spironolactone (ALDACTONE) 25 mg tablet   Yes Yes   Sig: Take 25 mg by mouth daily      Facility-Administered Medications: None     Allergies   Allergen Reactions    Codeine Nausea Only     Objective   First Vitals:   Blood Pressure: (!) 162/107 (07/13/24 0046)  Pulse: (!) 112 (07/13/24 0046)  Temperature: (!) 97.3 °F (36.3 °C) (07/13/24 0046)  Temp Source: Temporal (07/13/24 0046)  Respirations: 20 (07/13/24 0046)  Height: 5' 7\" (170.2 cm) (07/13/24 0046)  Weight - Scale: 81.6 kg (180 lb) (07/13/24 0046)  SpO2: 97 % (07/13/24 0046)    Current " "Vitals:   Blood Pressure: 115/77 (07/13/24 0700)  Pulse: 81 (07/13/24 0700)  Temperature: 97.5 °F (36.4 °C) (07/13/24 1500)  Temp Source: Temporal (07/13/24 1500)  Respirations: 18 (07/13/24 1500)  Height: 5' 7\" (170.2 cm) (07/13/24 0540)  Weight - Scale: 84.1 kg (185 lb 6.5 oz) (07/13/24 0540)  SpO2: 92 % (07/13/24 0700)    Intake/Output Summary (Last 24 hours) at 7/13/2024 1610  Last data filed at 7/13/2024 1100  Gross per 24 hour   Intake 1240 ml   Output 1145 ml   Net 95 ml     Invasive Devices       Peripheral Intravenous Line  Duration             Peripheral IV 07/13/24 Right;Ventral (anterior) Forearm <1 day              Drain  Duration             Urethral Catheter Three way 18 Fr. <1 day                  Physical Exam  Constitutional:       General: He is not in acute distress.     Appearance: Normal appearance. He is normal weight. He is not ill-appearing.      Comments: Wife at bedside   HENT:      Head: Normocephalic and atraumatic.      Right Ear: External ear normal.      Left Ear: External ear normal.      Nose: Nose normal.      Mouth/Throat:      Mouth: Mucous membranes are dry.      Pharynx: Oropharynx is clear.   Eyes:      Extraocular Movements: Extraocular movements intact.   Cardiovascular:      Rate and Rhythm: Normal rate.      Pulses: Normal pulses.   Pulmonary:      Effort: Pulmonary effort is normal. No respiratory distress.   Abdominal:      General: Abdomen is flat. There is no distension.      Palpations: Abdomen is soft.      Tenderness: There is no abdominal tenderness. There is no guarding or rebound.   Genitourinary:     Comments: 3 way catheter in place, no CBI running, patient has been having catheter manually irrigated. Watched nurse irrigate catheter without return of any blood clots or blood tinged urine. Completely clear without sediment or clot. Green drainage bag also noted to have no sediment or clot.   Musculoskeletal:         General: Normal range of motion.      Cervical " back: Normal range of motion.      Comments: Pt complaining of left flank pain although pain medication allows for complete resolution. Not reproducible by palpation   Skin:     General: Skin is dry.   Neurological:      Mental Status: He is alert and oriented to person, place, and time. Mental status is at baseline.   Psychiatric:         Mood and Affect: Mood normal.         Behavior: Behavior normal.     Lab Results: I have personally reviewed pertinent lab results.    CBC:   Lab Results   Component Value Date    WBC 10.44 (H) 07/13/2024    HGB 12.5 07/13/2024    HCT 36.9 07/13/2024    MCV 89 07/13/2024     07/13/2024    RBC 4.17 07/13/2024    MCH 30.0 07/13/2024    MCHC 33.9 07/13/2024    RDW 12.5 07/13/2024    MPV 10.5 07/13/2024    NRBC 0 07/13/2024   CMP:   Lab Results   Component Value Date    SODIUM 139 07/13/2024    K 3.9 07/13/2024     (H) 07/13/2024    CO2 23 07/13/2024    BUN 18 07/13/2024    CREATININE 0.95 07/13/2024    CALCIUM 8.6 07/13/2024    AST 13 07/13/2024    ALT 12 07/13/2024    ALKPHOS 59 07/13/2024    EGFR 87 07/13/2024     Imaging: I have personally reviewed pertinent reports.    EKG, Pathology, and Other Studies: I have personally reviewed pertinent reports.      Code Status: Level 1 - Full Code  Advance Directive and Living Will:      Power of :    POLST:      Counseling / Coordination of Care  Total floor / unit time spent today 60 minutes. Greater than 50% of total time was spent with the patient and / or family counseling and / or coordination of care. A description of the counseling / coordination of care: review of chart, labs, prior history, imaging, discussion with patient to obtain history, perform physical, review assessment and plan.     Katrina Elizabeth Billig, PA-C  7/13/2024

## 2024-07-13 NOTE — ASSESSMENT & PLAN NOTE
Follows with [place] [facility]  Continue Entresto, Coreg and Aldactone    Wt Readings from Last 3 Encounters:   07/13/24 81.6 kg (180 lb)

## 2024-07-13 NOTE — ASSESSMENT & PLAN NOTE
Secondary to distended bladder.  Continue with pain medicine, improving since Green catheter placement

## 2024-07-13 NOTE — ASSESSMENT & PLAN NOTE
History of gross hematuria with an extensive outpatient workup that was negative.  CT urogram and cystoscopy on outpatient revealed no abnormalities, follows with [facility] urology    Green catheter in place, now with a light pink urine  Continue manual irrigation  Proscar and Flomax started  Consult urology  Patient received a dose of Ancef in the ER

## 2024-07-13 NOTE — H&P
[facility]  H&P  Name: Jw Ray 59 y.o. male I MRN: [id number]  Unit/Bed#: ED 05 I Date of Admission: 7/13/2024   Date of Service: 7/13/2024 I Hospital Day: 0      Assessment & Plan   Gross hematuria  Assessment & Plan  History of gross hematuria with an extensive outpatient workup that was negative.  CT urogram and cystoscopy on outpatient revealed no abnormalities, follows with [facility] urology    Green catheter in place, now with a light pink urine  Continue manual irrigation  Proscar and Flomax started  Consult urology  Patient received a dose of Ancef in the ER      Benign prostatic hyperplasia with urinary retention  Assessment & Plan  Flomax and Proscar    Acute left flank pain  Assessment & Plan  Secondary to distended bladder.  Continue with pain medicine, improving since Green catheter placement    Chronic combined systolic and diastolic heart failure (HCC)  Assessment & Plan  Follows with [place] cardiology  Continue Entresto, Coreg and Aldactone    Wt Readings from Last 3 Encounters:   07/13/24 81.6 kg (180 lb)       Coronary artery disease due to lipid rich plaque  Assessment & Plan  Withhold aspirin for now due to hematuria  He does have a drug-eluting stent to the proximal segment of the OM 3.    Essential hypertension  Assessment & Plan  Continue Coreg, Entresto and Aldactone    Pure hypercholesterolemia  Assessment & Plan  Continue statin    Type 2 diabetes mellitus (HCC)  Assessment & Plan  Will place the patient on insulin sliding scale  Home regimen is glyburide/metformin and Jardiance    Lab Results   Component Value Date    HGBA1C 9.1 (H) 01/10/2024              Disposition  #1  Manual irrigation every 4 hours  #2  Consult to neurology  #3  Pain medicine  #4  Green catheter in place  #5 patient does not know his home medications, wife will bring in the list in the morning.  Home med rec need to be verified        VTE Prophylaxis:   / sequential compression  device   Code Status: Level 1 - Full Code       Anticipated Length of Stay:  Patient will be admitted on an Observation basis with an anticipated length of stay of less than 2 midnights.   Justification for Hospital Stay: Please see detailed plans noted above.    Chief Complaint:     Hematuria  History of Present Illness:  Jw Ray is a 59 y.o. male who has past medical history significant for heart failure, diabetes, hypertension, history of hematuria of unknown cause comes in for gross hematuria for the past 6 days.  Tonight developed lower abdominal pain and left lower back pain and difficulty urinating.  Patient had history of gross hematuria with normal outpatient urology workup.  The patient follows up [facility] urology.      Review of Systems:    Constitutional:  Denies fever or chills   Eyes:  Denies change in visual acuity   HENT:  Denies nasal congestion or sore throat   Respiratory:  Denies cough or shortness of breath   Cardiovascular:  Denies chest pain or edema   GI:  Denies abdominal pain or bloody stools  : Dysuria, flank pain  Musculoskeletal:  Denies back pain or joint pain   Integument:  Denies rash   Neurologic:  Denies headache or sensory changes   Endocrine:  Denies polyuria or polydipsia   Lymphatic:  Denies swollen glands   Psychiatric:  Denies depression or anxiety     Past Medical and Surgical History:   Past Medical History:   Diagnosis Date    CHF (congestive heart failure) (HCC)     DM (diabetes mellitus) (HCC)     HTN (hypertension)      Past Surgical History:   Procedure Laterality Date    CARDIAC SURGERY         Meds/Allergies:  No current facility-administered medications on file prior to encounter.     Current Outpatient Medications on File Prior to Encounter   Medication Sig Dispense Refill    atorvastatin (LIPITOR) 40 mg tablet Take 40 mg by mouth daily      carvedilol (COREG) 25 mg tablet Take 25 mg by mouth 2 (two) times a day with meals      Empagliflozin 25 MG TABS  "Take 25 mg by mouth daily      gabapentin (NEURONTIN) 100 mg capsule Take 100 mg by mouth daily      metFORMIN (GLUCOPHAGE) 1000 MG tablet Take 1 tablet by mouth 2 (two) times a day with meals      sacubitril-valsartan (Entresto) 49-51 MG TABS Take 1 tablet by mouth 2 (two) times a day      semaglutide, 1 mg/dose, (Ozempic, 1 MG/DOSE,) 2 mg/1.5 mL injection pen Inject 1 mg under the skin Once a week      sildenafil (VIAGRA) 25 MG tablet Take 25 mg by mouth daily as needed      spironolactone (ALDACTONE) 25 mg tablet Take 25 mg by mouth daily      aspirin (ECOTRIN LOW STRENGTH) 81 mg EC tablet Take 81 mg by mouth 2 (two) times a day      glyBURIDE (DIABETA) 2.5 mg tablet Take 2.5 mg by mouth daily with breakfast             Allergies:   Allergies   Allergen Reactions    Codeine Nausea Only       History:  Marital Status: /Civil Union     Substance Use History:   Social History     Substance and Sexual Activity   Alcohol Use Not Currently     Social History     Tobacco Use   Smoking Status Never   Smokeless Tobacco Current    Types: Chew     Social History     Substance and Sexual Activity   Drug Use Never       Family History:  History reviewed. No pertinent family history.    Physical Exam:     Vitals:   Blood Pressure: 125/79 (07/13/24 0345)  Pulse: 81 (07/13/24 0345)  Temperature: (!) 97.3 °F (36.3 °C) (07/13/24 0046)  Temp Source: Temporal (07/13/24 0046)  Respirations: 18 (07/13/24 0345)  Height: 5' 7\" (170.2 cm) (07/13/24 0046)  Weight - Scale: 81.6 kg (180 lb) (07/13/24 0046)  SpO2: 94 % (07/13/24 0345)    Constitutional:  Non-toxic appearance  Eyes:  EOMI, No scleral icterus   HENT:   oropharynx moist, external ears normal, external nose normal   Respiratory:  No respiratory distress, no wheezing   Cardiovascular:  Normal rate, no murmurs   GI:  Soft, nondistended, no guarding   : Green catheter present with light pink urine, some pain upon palpation  Musculoskeletal:  no tenderness, no deformities. "   Integument:  no jaundice, no rash   Neurologic:  Alert &awake, communicative, CN 2-12 normal,  no focal deficits noted   Psychiatric:  Speech and behavior appropriate       Lab Results: I have personally reviewed pertinent reports.      Results from last 7 days   Lab Units 07/13/24  0111   WBC Thousand/uL 9.37   HEMOGLOBIN g/dL 14.2   HEMATOCRIT % 42.4   PLATELETS Thousands/uL 209   SEGS PCT % 53   LYMPHO PCT % 37   MONO PCT % 7   EOS PCT % 3     Results from last 7 days   Lab Units 07/13/24  0111   POTASSIUM mmol/L 4.0   CHLORIDE mmol/L 105   CO2 mmol/L 23   BUN mg/dL 19   CREATININE mg/dL 0.94   CALCIUM mg/dL 9.2   ALK PHOS U/L 59   ALT U/L 12   AST U/L 13     Results from last 7 days   Lab Units 07/13/24  0111   INR  0.91           Imaging: I have personally reviewed pertinent reports.            Medical decision making: Moderate  Diagnosis addressed: History of gross hematuria with recurrent gross hematuria exacerbation  Data:   Reviewed  CBC, CMP, lactic acid, INR, urinalysis  Ordered CBC, BMP,  Reviewed external notes from urology and cardiology  Discussion of management with ER provider and urology: Started on Flomax and Proscar, manual irrigation, if pain subsides can be discharged to home with outpatient urology follow-up         Risk:  Prescription drug management: IV Rocephin  Discussion for hospitalization with ER provider: Hospitalization for ops for flank pain, follow-up with urology as an outpatient                     Epic Records Reviewed as well as Records in Care Everywhere    ** Please Note: Dragon 360 Dictation voice to text software was used in the creation of this document. **

## 2024-07-13 NOTE — ASSESSMENT & PLAN NOTE
Will place the patient on insulin sliding scale  Home regimen is glyburide/metformin and Jardiance    Lab Results   Component Value Date    HGBA1C 9.1 (H) 01/10/2024

## 2024-07-13 NOTE — ASSESSMENT & PLAN NOTE
History of gross hematuria with an extensive outpatient workup that was negative.  CT urogram and cystoscopy on outpatient revealed no abnormalities, follows with [facility] urology    Brewer catheter in place, now with a light pink urine  Continue manual irrigation  Proscar and Flomax started  Consult urology  Patient received a dose of Ancef in the ER  Continues to have active bleeding with brewer in place, urology recommending CT renal protocol at this time patient is declining over concern that his insurance is not accepted

## 2024-07-13 NOTE — PLAN OF CARE
Problem: Potential for Falls  Goal: Patient will remain free of falls  Description: INTERVENTIONS:  - Educate patient/family on patient safety including physical limitations  - Instruct patient to call for assistance with activity   - Consult OT/PT to assist with strengthening/mobility   - Keep Call bell within reach  - Keep bed low and locked with side rails adjusted as appropriate  - Keep care items and personal belongings within reach  - Initiate and maintain comfort rounds  - Make Fall Risk Sign visible to staff  - Offer Toileting every 2 Hours, in advance of need  - Initiate/Maintain bed/chair alarm  - Obtain necessary fall risk management equipment: yellow socks, yellow wristband  - Apply yellow socks and bracelet for high fall risk patients  - Consider moving patient to room near nurses station  7/13/2024 1435 by Kassie Brush RN  Outcome: Progressing  7/13/2024 1435 by Kassie Brush RN  Outcome: Progressing     Problem: PAIN - ADULT  Goal: Verbalizes/displays adequate comfort level or baseline comfort level  Description: Interventions:  - Encourage patient to monitor pain and request assistance  - Assess pain using appropriate pain scale  - Administer analgesics based on type and severity of pain and evaluate response  - Implement non-pharmacological measures as appropriate and evaluate response  - Consider cultural and social influences on pain and pain management  - Notify physician/advanced practitioner if interventions unsuccessful or patient reports new pain  7/13/2024 1435 by Kassie Brush RN  Outcome: Progressing  7/13/2024 1435 by Kassie Brush RN  Outcome: Progressing     Problem: INFECTION - ADULT  Goal: Absence or prevention of progression during hospitalization  Description: INTERVENTIONS:  - Assess and monitor for signs and symptoms of infection  - Monitor lab/diagnostic results  - Monitor all insertion sites, i.e. indwelling lines, tubes, and drains  - Monitor endotracheal if  appropriate and nasal secretions for changes in amount and color  - Bath appropriate cooling/warming therapies per order  - Administer medications as ordered  - Instruct and encourage patient and family to use good hand hygiene technique  - Identify and instruct in appropriate isolation precautions for identified infection/condition  7/13/2024 1435 by Kassie Brush RN  Outcome: Progressing  7/13/2024 1435 by Kassie Brush RN  Outcome: Progressing  Goal: Absence of fever/infection during neutropenic period  Description: INTERVENTIONS:  - Monitor WBC    7/13/2024 1435 by Kassie Brush RN  Outcome: Progressing  7/13/2024 1435 by Kassie Brush RN  Outcome: Progressing     Problem: SAFETY ADULT  Goal: Patient will remain free of falls  Description: INTERVENTIONS:  - Educate patient/family on patient safety including physical limitations  - Instruct patient to call for assistance with activity   - Consult OT/PT to assist with strengthening/mobility   - Keep Call bell within reach  - Keep bed low and locked with side rails adjusted as appropriate  - Keep care items and personal belongings within reach  - Initiate and maintain comfort rounds  - Make Fall Risk Sign visible to staff  - Offer Toileting every 2 Hours, in advance of need  - Initiate/Maintain bed/chair alarm  - Obtain necessary fall risk management equipment: yellow socks, yellow wristband  - Apply yellow socks and bracelet for high fall risk patients  - Consider moving patient to room near nurses station  7/13/2024 1435 by Kassie Brush RN  Outcome: Progressing  7/13/2024 1435 by Kassie Brush RN  Outcome: Progressing  Goal: Maintain or return to baseline ADL function  Description: INTERVENTIONS:  -  Assess patient's ability to carry out ADLs; assess patient's baseline for ADL function and identify physical deficits which impact ability to perform ADLs (bathing, care of mouth/teeth, toileting, grooming, dressing, etc.)  - Assess/evaluate cause  of self-care deficits   - Assess range of motion  - Assess patient's mobility; develop plan if impaired  - Assess patient's need for assistive devices and provide as appropriate  - Encourage maximum independence but intervene and supervise when necessary  - Involve family in performance of ADLs  - Assess for home care needs following discharge   - Consider OT consult to assist with ADL evaluation and planning for discharge  - Provide patient education as appropriate  7/13/2024 1435 by Kassie Brush RN  Outcome: Progressing  7/13/2024 1435 by Kassie Brush RN  Outcome: Progressing  Goal: Maintains/Returns to pre admission functional level  Description: INTERVENTIONS:  - Perform AM-PAC 6 Click Basic Mobility/ Daily Activity assessment daily.  - Set and communicate daily mobility goal to care team and patient/family/caregiver.   - Collaborate with rehabilitation services on mobility goals if consulted  - Perform Range of Motion 2 times a day.  - Reposition patient every 2 hours.  - Dangle patient 3 times a day  - Stand patient 3 times a day  - Ambulate patient 3 times a day  - Out of bed to chair 3 times a day   - Out of bed for meals 3 times a day  - Out of bed for toileting  - Record patient progress and toleration of activity level   7/13/2024 1435 by Kassie Brush RN  Outcome: Progressing  7/13/2024 1435 by Kassie Brush RN  Outcome: Progressing     Problem: DISCHARGE PLANNING  Goal: Discharge to home or other facility with appropriate resources  Description: INTERVENTIONS:  - Identify barriers to discharge w/patient and caregiver  - Arrange for needed discharge resources and transportation as appropriate  - Identify discharge learning needs (meds, wound care, etc.)  - Arrange for interpretive services to assist at discharge as needed  - Refer to Case Management Department for coordinating discharge planning if the patient needs post-hospital services based on physician/advanced practitioner order or  complex needs related to functional status, cognitive ability, or social support system  7/13/2024 1435 by Kassie Brush RN  Outcome: Progressing  7/13/2024 1435 by Kassie Brush RN  Outcome: Progressing     Problem: Knowledge Deficit  Goal: Patient/family/caregiver demonstrates understanding of disease process, treatment plan, medications, and discharge instructions  Description: Complete learning assessment and assess knowledge base.  Interventions:  - Provide teaching at level of understanding  - Provide teaching via preferred learning methods  7/13/2024 1435 by Kassie Brush RN  Outcome: Progressing  7/13/2024 1435 by Kassie Brush RN  Outcome: Progressing

## 2024-07-13 NOTE — ASSESSMENT & PLAN NOTE
Withhold aspirin for now due to hematuria  He does have a drug-eluting stent to the proximal segment of the OM 3.

## 2024-07-14 LAB
ANION GAP SERPL CALCULATED.3IONS-SCNC: 7 MMOL/L (ref 4–13)
BACTERIA UR CULT: NORMAL
BASOPHILS # BLD AUTO: 0.05 THOUSANDS/ÂΜL (ref 0–0.1)
BASOPHILS NFR BLD AUTO: 0 % (ref 0–1)
BUN SERPL-MCNC: 15 MG/DL (ref 5–25)
CALCIUM SERPL-MCNC: 8.6 MG/DL (ref 8.4–10.2)
CHLORIDE SERPL-SCNC: 106 MMOL/L (ref 96–108)
CO2 SERPL-SCNC: 22 MMOL/L (ref 21–32)
CREAT SERPL-MCNC: 1.07 MG/DL (ref 0.6–1.3)
EOSINOPHIL # BLD AUTO: 0.11 THOUSAND/ÂΜL (ref 0–0.61)
EOSINOPHIL NFR BLD AUTO: 1 % (ref 0–6)
ERYTHROCYTE [DISTWIDTH] IN BLOOD BY AUTOMATED COUNT: 12.6 % (ref 11.6–15.1)
GFR SERPL CREATININE-BSD FRML MDRD: 75 ML/MIN/1.73SQ M
GLUCOSE P FAST SERPL-MCNC: 166 MG/DL (ref 65–99)
GLUCOSE SERPL-MCNC: 166 MG/DL (ref 65–140)
GLUCOSE SERPL-MCNC: 174 MG/DL (ref 65–140)
GLUCOSE SERPL-MCNC: 183 MG/DL (ref 65–140)
GLUCOSE SERPL-MCNC: 201 MG/DL (ref 65–140)
GLUCOSE SERPL-MCNC: 207 MG/DL (ref 65–140)
HCT VFR BLD AUTO: 37.9 % (ref 36.5–49.3)
HGB BLD-MCNC: 12.6 G/DL (ref 12–17)
IMM GRANULOCYTES # BLD AUTO: 0.03 THOUSAND/UL (ref 0–0.2)
IMM GRANULOCYTES NFR BLD AUTO: 0 % (ref 0–2)
LYMPHOCYTES # BLD AUTO: 1.73 THOUSANDS/ÂΜL (ref 0.6–4.47)
LYMPHOCYTES NFR BLD AUTO: 16 % (ref 14–44)
MCH RBC QN AUTO: 29.7 PG (ref 26.8–34.3)
MCHC RBC AUTO-ENTMCNC: 33.2 G/DL (ref 31.4–37.4)
MCV RBC AUTO: 89 FL (ref 82–98)
MONOCYTES # BLD AUTO: 1 THOUSAND/ÂΜL (ref 0.17–1.22)
MONOCYTES NFR BLD AUTO: 9 % (ref 4–12)
NEUTROPHILS # BLD AUTO: 8.22 THOUSANDS/ÂΜL (ref 1.85–7.62)
NEUTS SEG NFR BLD AUTO: 74 % (ref 43–75)
NRBC BLD AUTO-RTO: 0 /100 WBCS
PLATELET # BLD AUTO: 151 THOUSANDS/UL (ref 149–390)
PMV BLD AUTO: 10.5 FL (ref 8.9–12.7)
POTASSIUM SERPL-SCNC: 4 MMOL/L (ref 3.5–5.3)
RBC # BLD AUTO: 4.24 MILLION/UL (ref 3.88–5.62)
SODIUM SERPL-SCNC: 135 MMOL/L (ref 135–147)
WBC # BLD AUTO: 11.14 THOUSAND/UL (ref 4.31–10.16)

## 2024-07-14 PROCEDURE — 80048 BASIC METABOLIC PNL TOTAL CA: CPT | Performed by: FAMILY MEDICINE

## 2024-07-14 PROCEDURE — 85025 COMPLETE CBC W/AUTO DIFF WBC: CPT | Performed by: FAMILY MEDICINE

## 2024-07-14 PROCEDURE — 82948 REAGENT STRIP/BLOOD GLUCOSE: CPT

## 2024-07-14 PROCEDURE — 99232 SBSQ HOSP IP/OBS MODERATE 35: CPT | Performed by: UROLOGY

## 2024-07-14 PROCEDURE — 99232 SBSQ HOSP IP/OBS MODERATE 35: CPT | Performed by: FAMILY MEDICINE

## 2024-07-14 RX ORDER — DOCUSATE SODIUM 100 MG/1
100 CAPSULE, LIQUID FILLED ORAL 2 TIMES DAILY
Status: DISCONTINUED | OUTPATIENT
Start: 2024-07-14 | End: 2024-07-16 | Stop reason: HOSPADM

## 2024-07-14 RX ORDER — POLYETHYLENE GLYCOL 3350 17 G/17G
17 POWDER, FOR SOLUTION ORAL DAILY PRN
Status: DISCONTINUED | OUTPATIENT
Start: 2024-07-14 | End: 2024-07-16 | Stop reason: HOSPADM

## 2024-07-14 RX ORDER — SENNOSIDES 8.6 MG
1 TABLET ORAL
Status: DISCONTINUED | OUTPATIENT
Start: 2024-07-14 | End: 2024-07-16 | Stop reason: HOSPADM

## 2024-07-14 RX ADMIN — SPIRONOLACTONE 25 MG: 25 TABLET ORAL at 08:14

## 2024-07-14 RX ADMIN — DOCUSATE SODIUM 100 MG: 100 CAPSULE, LIQUID FILLED ORAL at 21:16

## 2024-07-14 RX ADMIN — ACETAMINOPHEN 325MG 650 MG: 325 TABLET ORAL at 19:30

## 2024-07-14 RX ADMIN — ACETAMINOPHEN 325MG 650 MG: 325 TABLET ORAL at 13:56

## 2024-07-14 RX ADMIN — ACETAMINOPHEN 325MG 650 MG: 325 TABLET ORAL at 06:25

## 2024-07-14 RX ADMIN — ATORVASTATIN CALCIUM 40 MG: 40 TABLET, FILM COATED ORAL at 18:13

## 2024-07-14 RX ADMIN — INSULIN LISPRO 1 UNITS: 100 INJECTION, SOLUTION INTRAVENOUS; SUBCUTANEOUS at 17:44

## 2024-07-14 RX ADMIN — SACUBITRIL AND VALSARTAN 1 TABLET: 49; 51 TABLET, FILM COATED ORAL at 08:13

## 2024-07-14 RX ADMIN — OXYCODONE HYDROCHLORIDE 5 MG: 5 TABLET ORAL at 20:43

## 2024-07-14 RX ADMIN — FINASTERIDE 5 MG: 5 TABLET, FILM COATED ORAL at 08:12

## 2024-07-14 RX ADMIN — CARVEDILOL 25 MG: 12.5 TABLET, FILM COATED ORAL at 08:12

## 2024-07-14 RX ADMIN — INSULIN LISPRO 2 UNITS: 100 INJECTION, SOLUTION INTRAVENOUS; SUBCUTANEOUS at 12:23

## 2024-07-14 RX ADMIN — INSULIN LISPRO 1 UNITS: 100 INJECTION, SOLUTION INTRAVENOUS; SUBCUTANEOUS at 08:13

## 2024-07-14 NOTE — PROGRESS NOTES
Progress Note - General Surgery   Jw Ray 59 y.o. male MRN: [id number]  Unit/Bed#: -[facility] Encounter: [id number]    Assessment:  59 y.o. male with gross hematuria, hx of same, follows with [facility]     CTAP IMPRESSION:  - High density contents of the left renal collecting system and proximal left ureter indicate hematuria.  - Numerous predominately subcentimeter bilateral renal cortical lesions likely to represent hemorrhagic cysts. These were described on prior outside CTs.  - Prostamegaly protruding into the bladder base with prominent bladder distention. No bladder wall thickening.     - Afebrile, VSS on room air  - WBC 11 (10, 9)  - Hgb 12 (12, 14)  - BMP WNL aside from elevated glucose  - UA with moderate leuks, positive nitrites, ketones - microscopic field obscured due to hematuria - culture with no growth, < 1000 cfu/mL    - Improved pain of left flank, had episode of more severe pain overlying bladder but that is also resolved at this time    - Yesterday urine completely cleared without blood or clots noted - overnight with flushing some pink urine was again noted and now patient has gross hematuria without clots      - PMHx BPH, CHF, CAD w/ drug-eluting stent - only on daily ASA, HTN, HLD, DM2,      Plan: Thoroughly discussed with Priscilla Serrato Urology CRNP   - Patient known to [facility] for gross hematuria with complete work up previously negative  - Discussed with patient at this point after recurrence of hematuria a CT renal protocol scan is indicated to further assess for active extravasation, suspicious lesions, cysts, and bleeding of upper tract   - Explained based on findings on imaging patient may benefit from ureteroscopy/retrograde study  - Patient understanding of need for further investigation of this issue but states he has insurance issues and they will not cover tests performed here, this is a valid concern. He requests discharge so he can go to [facility] hospital and be  "seen with testing done that will be covered. He is noted to be stable at this time    - Reviewed indication for finding a solution to this ongoing hematuria and patient verbalized understanding but states he will refuse further testing here   - With current stability and patient preference for being at [facility] as he had prior work up there for the same issue which has been ongoing x 1 week from urologic standpoint agreeable to patient discharge with recommendation and discussion held on importance of presenting to outside hospital  - Recommend he d/c with brewer in place  - SLIM discussing with CM  - Continue to manually irrigate catheter as details are figured out regarding dispo   - Proscar/Flomax  - Trend H&H and urine  - Co-morbid conditions and final dispo planning per primary team     Subjective:   Patient states urine was noted to be pink tinged overnight and eventually became dark red, bloody again. He denies any continued pain in the left kidney and states he had some cramping pain in the lower abdomen, suprapubic region but this has resolved. No fevers or chills.     Objective:   Blood pressure 119/79, pulse 98, temperature 97.8 °F (36.6 °C), resp. rate 18, height 5' 7\" (1.702 m), weight 84.1 kg (185 lb 6.5 oz), SpO2 92%.,Body mass index is 29.04 kg/m².    Intake/Output Summary (Last 24 hours) at 7/14/2024 1121  Last data filed at 7/14/2024 0900  Gross per 24 hour   Intake 120 ml   Output 1405 ml   Net -1285 ml     Invasive Devices       Peripheral Intravenous Line  Duration             Peripheral IV 07/13/24 Right;Ventral (anterior) Forearm 1 day              Drain  Duration             Urethral Catheter Three way 18 Fr. 1 day                  Physical Exam:   General: no acute distress, pt appears well and comfortable, resting in bed watching TV  Skin: warm and dry to touch  Pulmonary: normal effort  Abdominal: soft, non-distended abdomen, non-tender to palpation, no guarding or rebound  : Brewer now w/ " gross hematuria, nursing reports it was initially pink but now punch colored, dark red urine, no clots or sediment, no flank pain  Musculoskeletal: no LE edema present  Neuro: alert and oriented     Lab, Imaging and other studies:I have personally reviewed pertinent lab results.    CBC:   Lab Results   Component Value Date    WBC 11.14 (H) 07/14/2024    HGB 12.6 07/14/2024    HCT 37.9 07/14/2024    MCV 89 07/14/2024     07/14/2024    RBC 4.24 07/14/2024    MCH 29.7 07/14/2024    MCHC 33.2 07/14/2024    RDW 12.6 07/14/2024    MPV 10.5 07/14/2024    NRBC 0 07/14/2024   CMP:   Lab Results   Component Value Date    SODIUM 135 07/14/2024    K 4.0 07/14/2024     07/14/2024    CO2 22 07/14/2024    BUN 15 07/14/2024    CREATININE 1.07 07/14/2024    CALCIUM 8.6 07/14/2024    EGFR 75 07/14/2024     VTE Pharmacologic Prophylaxis: Reason for no pharmacologic prophylaxis low risk, hematuria  VTE Mechanical Prophylaxis: sequential compression device    Katrina Elizabeth Billig, PA-C  7/14/2024

## 2024-07-14 NOTE — PROGRESS NOTES
[facility]  Progress Note  Name: Jw Ray I  MRN: [id number]  Unit/Bed#: -01 I Date of Admission: 7/13/2024   Date of Service: 7/14/2024 I Hospital Day: 0    Assessment & Plan   Gross hematuria  Assessment & Plan  History of gross hematuria with an extensive outpatient workup that was negative.  CT urogram and cystoscopy on outpatient revealed no abnormalities, follows with [facility] u[facility]    Brewer catheter in place, now with a light pink urine  Continue manual irrigation  Proscar and Flomax started  Consult urology  Patient received a dose of Ancef in the ER  Continues to have active bleeding with brewer in place, urology recommending CT renal protocol at this time patient is declining over concern that his insurance is not accepted       Benign prostatic hyperplasia with urinary retention  Assessment & Plan  Flomax and Proscar    Type 2 diabetes mellitus (HCC)  Assessment & Plan  Will place the patient on insulin sliding scale  Home regimen is glyburide/metformin and Jardiance    Lab Results   Component Value Date    HGBA1C 9.1 (H) 01/10/2024       Pure hypercholesterolemia  Assessment & Plan  Continue statin    Essential hypertension  Assessment & Plan  Continue Coreg, Entresto and Aldactone    Coronary artery disease due to lipid rich plaque  Assessment & Plan  Withhold aspirin for now due to hematuria  He does have a drug-eluting stent to the proximal segment of the OM 3.    Chronic combined systolic and diastolic heart failure (HCC)  Assessment & Plan  Follows with [place] [facility]  Continue Entresto, Coreg and Aldactone    Wt Readings from Last 3 Encounters:   07/13/24 84.1 kg (185 lb 6.5 oz)       Acute left flank pain  Assessment & Plan  Secondary to distended bladder.  Continue with pain medicine, improving since Brewer catheter placement               VTE Pharmacologic Prophylaxis: VTE Score: 2 Moderate Risk (Score 3-4) - Pharmacological DVT Prophylaxis  Contraindicated. Sequential Compression Devices Ordered.    Mobility:   Basic Mobility Inpatient Raw Score: 24  JH-HLM Goal: 8: Walk 250 feet or more  JH-HLM Achieved: 7: Walk 25 feet or more  JH-HLM Goal NOT achieved. Continue with multidisciplinary rounding and encourage appropriate mobility to improve upon JH-HLM goals.    Patient Centered Rounds: I performed bedside rounds with nursing staff today.   Discussions with Specialists or Other Care Team Provider: Urology and Case management     Education and Discussions with Family / Patient: Patient declined call to .     Total Time Spent on Date of Encounter in care of patient: 35 mins. This time was spent on one or more of the following: performing physical exam; counseling and coordination of care; obtaining or reviewing history; documenting in the medical record; reviewing/ordering tests, medications or procedures; communicating with other healthcare professionals and discussing with patient's family/caregivers.    Current Length of Stay: 0 day(s)  Current Patient Status: Observation   Certification Statement: The patient will continue to require additional inpatient hospital stay due to gross hematuria   Discharge Plan: Anticipate discharge in 24-48 hrs to home.    Code Status: Level 1 - Full Code    Subjective:   Patient reports concern his insurance is not accepted at this hospital. Having acute large amount of gross hematuria this morning.     Objective:     Vitals:   Temp (24hrs), Av °F (36.7 °C), Min:97.5 °F (36.4 °C), Max:98.8 °F (37.1 °C)    Temp:  [97.5 °F (36.4 °C)-98.8 °F (37.1 °C)] 97.8 °F (36.6 °C)  HR:  [98] 98  Resp:  [18] 18  BP: (106-119)/(64-79) 119/79  Body mass index is 29.04 kg/m².     Input and Output Summary (last 24 hours):     Intake/Output Summary (Last 24 hours) at 2024 1240  Last data filed at 2024 0900  Gross per 24 hour   Intake 120 ml   Output 1405 ml   Net -1285 ml       Physical Exam:   Physical  Exam  Vitals and nursing note reviewed.   Constitutional:       General: He is not in acute distress.     Appearance: He is well-developed.   HENT:      Head: Normocephalic and atraumatic.   Eyes:      Conjunctiva/sclera: Conjunctivae normal.   Cardiovascular:      Rate and Rhythm: Normal rate and regular rhythm.      Heart sounds: No murmur heard.  Pulmonary:      Effort: Pulmonary effort is normal. No respiratory distress.      Breath sounds: Normal breath sounds.   Abdominal:      Palpations: Abdomen is soft.      Tenderness: There is no abdominal tenderness.   Genitourinary:     Comments: Green in place with red colored urine   Musculoskeletal:         General: No swelling.      Cervical back: Neck supple.   Skin:     General: Skin is warm and dry.      Capillary Refill: Capillary refill takes less than 2 seconds.   Neurological:      Mental Status: He is alert.   Psychiatric:         Mood and Affect: Mood normal.          Additional Data:     Labs:  Results from last 7 days   Lab Units 07/14/24  0430   WBC Thousand/uL 11.14*   HEMOGLOBIN g/dL 12.6   HEMATOCRIT % 37.9   PLATELETS Thousands/uL 151   SEGS PCT % 74   LYMPHO PCT % 16   MONO PCT % 9   EOS PCT % 1     Results from last 7 days   Lab Units 07/14/24  0430 07/13/24  0629 07/13/24  0111   SODIUM mmol/L 135   < > 137   POTASSIUM mmol/L 4.0   < > 4.0   CHLORIDE mmol/L 106   < > 105   CO2 mmol/L 22   < > 23   BUN mg/dL 15   < > 19   CREATININE mg/dL 1.07   < > 0.94   ANION GAP mmol/L 7   < > 9   CALCIUM mg/dL 8.6   < > 9.2   ALBUMIN g/dL  --   --  4.5   TOTAL BILIRUBIN mg/dL  --   --  0.39   ALK PHOS U/L  --   --  59   ALT U/L  --   --  12   AST U/L  --   --  13   GLUCOSE RANDOM mg/dL 166*   < > 243*    < > = values in this interval not displayed.     Results from last 7 days   Lab Units 07/13/24  0111   INR  0.91     Results from last 7 days   Lab Units 07/14/24  1113 07/14/24  0737 07/13/24  1620 07/13/24  1147 07/13/24  0750   POC GLUCOSE mg/dl 207* 174*  171* 162* 143*         Results from last 7 days   Lab Units 07/13/24  0111   LACTIC ACID mmol/L 2.0       Lines/Drains:  Invasive Devices       Peripheral Intravenous Line  Duration             Peripheral IV 07/13/24 Right;Ventral (anterior) Forearm 1 day              Drain  Duration             Urethral Catheter Three way 18 Fr. 1 day                  Urinary Catheter:  Goal for removal:  keep in place for hematuria                Imaging: No pertinent imaging reviewed.    Recent Cultures (last 7 days):   Results from last 7 days   Lab Units 07/13/24  0108   URINE CULTURE  No Growth <1000 cfu/mL       Last 24 Hours Medication List:   Current Facility-Administered Medications   Medication Dose Route Frequency Provider Last Rate    acetaminophen  650 mg Oral Q6H PRN Renny Snider MD      atorvastatin  40 mg Oral After Dinner Renny Snider MD      carvedilol  25 mg Oral BID With Meals Renny Snider MD      finasteride  5 mg Oral Daily Renny Snider MD      insulin lispro  1-6 Units Subcutaneous TID AC Renny Snider MD      morphine injection  4 mg Intravenous Q4H PRN Renny Snider MD      nicotine  1 patch Transdermal Daily Renny Snider MD      oxyCODONE  5 mg Oral Q6H PRN Renny Snider MD      sacubitril-valsartan  1 tablet Oral BID Renny Snider MD      spironolactone  25 mg Oral Daily Renny Snider MD          Today, Patient Was Seen By: Salma Bella DO    **Please Note: This note may have been constructed using a voice recognition system.**

## 2024-07-14 NOTE — PLAN OF CARE
Problem: PAIN - ADULT  Goal: Verbalizes/displays adequate comfort level or baseline comfort level  Description: Interventions:  - Encourage patient to monitor pain and request assistance  - Assess pain using appropriate pain scale  - Administer analgesics based on type and severity of pain and evaluate response  - Implement non-pharmacological measures as appropriate and evaluate response  - Consider cultural and social influences on pain and pain management  - Notify physician/advanced practitioner if interventions unsuccessful or patient reports new pain  Outcome: Progressing     Problem: INFECTION - ADULT  Goal: Absence or prevention of progression during hospitalization  Description: INTERVENTIONS:  - Assess and monitor for signs and symptoms of infection  - Monitor lab/diagnostic results  - Monitor all insertion sites, i.e. indwelling lines, tubes, and drains  - Monitor endotracheal if appropriate and nasal secretions for changes in amount and color  - [place] appropriate cooling/warming therapies per order  - Administer medications as ordered  - Instruct and encourage patient and family to use good hand hygiene technique  - Identify and instruct in appropriate isolation precautions for identified infection/condition  Outcome: Progressing

## 2024-07-14 NOTE — PLAN OF CARE

## 2024-07-14 NOTE — PLAN OF CARE
Problem: PAIN - ADULT  Goal: Verbalizes/displays adequate comfort level or baseline comfort level  Description: Interventions:  - Encourage patient to monitor pain and request assistance  - Assess pain using appropriate pain scale  - Administer analgesics based on type and severity of pain and evaluate response  - Implement non-pharmacological measures as appropriate and evaluate response  - Consider cultural and social influences on pain and pain management  - Notify physician/advanced practitioner if interventions unsuccessful or patient reports new pain  Outcome: Progressing     Problem: INFECTION - ADULT  Goal: Absence or prevention of progression during hospitalization  Description: INTERVENTIONS:  - Assess and monitor for signs and symptoms of infection  - Monitor lab/diagnostic results  - Monitor all insertion sites, i.e. indwelling lines, tubes, and drains  - Monitor endotracheal if appropriate and nasal secretions for changes in amount and color  - [place] appropriate cooling/warming therapies per order  - Administer medications as ordered  - Instruct and encourage patient and family to use good hand hygiene technique  - Identify and instruct in appropriate isolation precautions for identified infection/condition  Outcome: Progressing  Goal: Absence of fever/infection during neutropenic period  Description: INTERVENTIONS:  - Monitor WBC    Outcome: Progressing     Problem: DISCHARGE PLANNING  Goal: Discharge to home or other facility with appropriate resources  Description: INTERVENTIONS:  - Identify barriers to discharge w/patient and caregiver  - Arrange for needed discharge resources and transportation as appropriate  - Identify discharge learning needs (meds, wound care, etc.)  - Arrange for interpretive services to assist at discharge as needed  - Refer to Case Management Department for coordinating discharge planning if the patient needs post-hospital services based on physician/advanced  practitioner order or complex needs related to functional status, cognitive ability, or social support system  Outcome: Progressing     Problem: Knowledge Deficit  Goal: Patient/family/caregiver demonstrates understanding of disease process, treatment plan, medications, and discharge instructions  Description: Complete learning assessment and assess knowledge base.  Interventions:  - Provide teaching at level of understanding  - Provide teaching via preferred learning methods  Outcome: Progressing

## 2024-07-14 NOTE — PLAN OF CARE
Problem: GENITOURINARY - ADULT  Goal: Maintains or returns to baseline urinary function  Description: INTERVENTIONS:  - Assess urinary function  - Encourage oral fluids to ensure adequate hydration if ordered  - Administer IV fluids as ordered to ensure adequate hydration  - Administer ordered medications as needed  Outcome: Progressing     Problem: GENITOURINARY - ADULT  Goal: Absence of urinary retention  Description: INTERVENTIONS:  - Assess patient's ability to void and empty bladder  - Monitor I/O  - Bladder scan as needed  - Discuss with physician/AP medications to alleviate retention as needed  - Discuss catheterization for long term situations as appropriate  Outcome: Progressing     Problem: GENITOURINARY - ADULT  Goal: Urinary catheter remains patent  Description: INTERVENTIONS:  - Assess patency of urinary catheter  - If patient has a chronic brewer, consider changing catheter if non-functioning  - Follow guidelines for intermittent irrigation of non-functioning urinary catheter  Outcome: Progressing

## 2024-07-15 ENCOUNTER — APPOINTMENT (INPATIENT)
Dept: CT IMAGING | Facility: HOSPITAL | Age: 59
DRG: 696 | End: 2024-07-15
Payer: COMMERCIAL

## 2024-07-15 LAB
ANION GAP SERPL CALCULATED.3IONS-SCNC: 7 MMOL/L (ref 4–13)
BASOPHILS # BLD AUTO: 0.02 THOUSANDS/ÂΜL (ref 0–0.1)
BASOPHILS NFR BLD AUTO: 0 % (ref 0–1)
BUN SERPL-MCNC: 19 MG/DL (ref 5–25)
CALCIUM SERPL-MCNC: 8.7 MG/DL (ref 8.4–10.2)
CHLORIDE SERPL-SCNC: 103 MMOL/L (ref 96–108)
CO2 SERPL-SCNC: 23 MMOL/L (ref 21–32)
CREAT SERPL-MCNC: 1.03 MG/DL (ref 0.6–1.3)
EOSINOPHIL # BLD AUTO: 0.13 THOUSAND/ÂΜL (ref 0–0.61)
EOSINOPHIL NFR BLD AUTO: 2 % (ref 0–6)
ERYTHROCYTE [DISTWIDTH] IN BLOOD BY AUTOMATED COUNT: 12.4 % (ref 11.6–15.1)
GFR SERPL CREATININE-BSD FRML MDRD: 79 ML/MIN/1.73SQ M
GLUCOSE SERPL-MCNC: 164 MG/DL (ref 65–140)
GLUCOSE SERPL-MCNC: 173 MG/DL (ref 65–140)
GLUCOSE SERPL-MCNC: 185 MG/DL (ref 65–140)
GLUCOSE SERPL-MCNC: 188 MG/DL (ref 65–140)
GLUCOSE SERPL-MCNC: 195 MG/DL (ref 65–140)
HCT VFR BLD AUTO: 36.5 % (ref 36.5–49.3)
HGB BLD-MCNC: 12.2 G/DL (ref 12–17)
IMM GRANULOCYTES # BLD AUTO: 0.02 THOUSAND/UL (ref 0–0.2)
IMM GRANULOCYTES NFR BLD AUTO: 0 % (ref 0–2)
LYMPHOCYTES # BLD AUTO: 1.68 THOUSANDS/ÂΜL (ref 0.6–4.47)
LYMPHOCYTES NFR BLD AUTO: 23 % (ref 14–44)
MCH RBC QN AUTO: 29.6 PG (ref 26.8–34.3)
MCHC RBC AUTO-ENTMCNC: 33.4 G/DL (ref 31.4–37.4)
MCV RBC AUTO: 89 FL (ref 82–98)
MONOCYTES # BLD AUTO: 0.6 THOUSAND/ÂΜL (ref 0.17–1.22)
MONOCYTES NFR BLD AUTO: 8 % (ref 4–12)
NEUTROPHILS # BLD AUTO: 4.9 THOUSANDS/ÂΜL (ref 1.85–7.62)
NEUTS SEG NFR BLD AUTO: 67 % (ref 43–75)
NRBC BLD AUTO-RTO: 0 /100 WBCS
PLATELET # BLD AUTO: 172 THOUSANDS/UL (ref 149–390)
PMV BLD AUTO: 10.6 FL (ref 8.9–12.7)
POTASSIUM SERPL-SCNC: 4.2 MMOL/L (ref 3.5–5.3)
RBC # BLD AUTO: 4.12 MILLION/UL (ref 3.88–5.62)
SODIUM SERPL-SCNC: 133 MMOL/L (ref 135–147)
WBC # BLD AUTO: 7.35 THOUSAND/UL (ref 4.31–10.16)

## 2024-07-15 PROCEDURE — 82948 REAGENT STRIP/BLOOD GLUCOSE: CPT

## 2024-07-15 PROCEDURE — 74178 CT ABD&PLV WO CNTR FLWD CNTR: CPT

## 2024-07-15 PROCEDURE — NC001 PR NO CHARGE: Performed by: UROLOGY

## 2024-07-15 PROCEDURE — 85025 COMPLETE CBC W/AUTO DIFF WBC: CPT

## 2024-07-15 PROCEDURE — 99232 SBSQ HOSP IP/OBS MODERATE 35: CPT

## 2024-07-15 PROCEDURE — 99232 SBSQ HOSP IP/OBS MODERATE 35: CPT | Performed by: UROLOGY

## 2024-07-15 PROCEDURE — 80048 BASIC METABOLIC PNL TOTAL CA: CPT

## 2024-07-15 RX ORDER — METHOCARBAMOL 500 MG/1
500 TABLET, FILM COATED ORAL EVERY 6 HOURS PRN
Status: DISCONTINUED | OUTPATIENT
Start: 2024-07-15 | End: 2024-07-16 | Stop reason: HOSPADM

## 2024-07-15 RX ORDER — OXYBUTYNIN CHLORIDE 5 MG/1
5 TABLET ORAL 3 TIMES DAILY
Status: DISCONTINUED | OUTPATIENT
Start: 2024-07-15 | End: 2024-07-16

## 2024-07-15 RX ADMIN — OXYCODONE HYDROCHLORIDE 5 MG: 5 TABLET ORAL at 03:15

## 2024-07-15 RX ADMIN — INSULIN LISPRO 1 UNITS: 100 INJECTION, SOLUTION INTRAVENOUS; SUBCUTANEOUS at 16:46

## 2024-07-15 RX ADMIN — CARVEDILOL 25 MG: 12.5 TABLET, FILM COATED ORAL at 09:12

## 2024-07-15 RX ADMIN — ATORVASTATIN CALCIUM 40 MG: 40 TABLET, FILM COATED ORAL at 19:00

## 2024-07-15 RX ADMIN — IOHEXOL 100 ML: 350 INJECTION, SOLUTION INTRAVENOUS at 13:08

## 2024-07-15 RX ADMIN — SENNOSIDES 8.6 MG: 8.6 TABLET, FILM COATED ORAL at 22:26

## 2024-07-15 RX ADMIN — DOCUSATE SODIUM 100 MG: 100 CAPSULE, LIQUID FILLED ORAL at 08:54

## 2024-07-15 RX ADMIN — OXYBUTYNIN CHLORIDE 5 MG: 5 TABLET ORAL at 20:21

## 2024-07-15 RX ADMIN — DOCUSATE SODIUM 100 MG: 100 CAPSULE, LIQUID FILLED ORAL at 19:00

## 2024-07-15 RX ADMIN — SPIRONOLACTONE 25 MG: 25 TABLET ORAL at 08:54

## 2024-07-15 RX ADMIN — OXYBUTYNIN CHLORIDE 5 MG: 5 TABLET ORAL at 16:45

## 2024-07-15 RX ADMIN — INSULIN LISPRO 1 UNITS: 100 INJECTION, SOLUTION INTRAVENOUS; SUBCUTANEOUS at 08:00

## 2024-07-15 RX ADMIN — NICOTINE 1 PATCH: 14 PATCH, EXTENDED RELEASE TRANSDERMAL at 08:54

## 2024-07-15 RX ADMIN — SACUBITRIL AND VALSARTAN 1 TABLET: 49; 51 TABLET, FILM COATED ORAL at 19:00

## 2024-07-15 RX ADMIN — INSULIN LISPRO 1 UNITS: 100 INJECTION, SOLUTION INTRAVENOUS; SUBCUTANEOUS at 12:00

## 2024-07-15 RX ADMIN — FINASTERIDE 5 MG: 5 TABLET, FILM COATED ORAL at 08:54

## 2024-07-15 RX ADMIN — OXYCODONE HYDROCHLORIDE 5 MG: 5 TABLET ORAL at 11:13

## 2024-07-15 RX ADMIN — SACUBITRIL AND VALSARTAN 1 TABLET: 49; 51 TABLET, FILM COATED ORAL at 08:55

## 2024-07-15 RX ADMIN — CARVEDILOL 25 MG: 12.5 TABLET, FILM COATED ORAL at 16:45

## 2024-07-15 NOTE — ASSESSMENT & PLAN NOTE
History of gross hematuria with an extensive outpatient workup that was negative.  CT urogram and cystoscopy on outpatient revealed no abnormalities, follows with [facility]  Initially declining workup here given insurance, however since remedied and agreeable to ongoing management  Appreciate ongoing urology recs  Continue brewer catheter with manual irrigation. Hold CBI at this time given concern for source from upper tract  CT renal protocol ordered  Trend H&H. Transfuse as indicated.   Continue Proscar, Flomax

## 2024-07-15 NOTE — PROGRESS NOTES
[facility]  Progress Note  Name: Jw Ray I  MRN: [id number]  Unit/Bed#: -[facility] I Date of Admission: 7/13/2024   Date of Service: 7/15/2024 I Hospital Day: 0    Assessment & Plan   * Gross hematuria  Assessment & Plan  History of gross hematuria with an extensive outpatient workup that was negative.  CT urogram and cystoscopy on outpatient revealed no abnormalities, [facility]  Initially declining workup here given insurance, however since remedied and agreeable to ongoing management  Appreciate ongoing urology recs  Continue brewer catheter with manual irrigation. Hold CBI at this time given concern for source from upper tract  CT renal protocol ordered  Trend H&H. Transfuse as indicated.   Continue Proscar, Flomax        Type 2 diabetes mellitus (HCC)  Assessment & Plan  Continue SSI & acu-checks ac.hs  Hold oral medications    Lab Results   Component Value Date    HGBA1C 9.1 (H) 01/10/2024       Essential hypertension  Assessment & Plan  Continue Coreg, Entresto and Aldactone    Coronary artery disease due to lipid rich plaque  Assessment & Plan  Withhold aspirin for now due to hematuria  He does have a drug-eluting stent to the proximal segment of the OM 3    Chronic combined systolic and diastolic heart failure (HCC)  Assessment & Plan  Follows with [place] [facility]ology  Continue Entresto, Coreg and Aldactone    Wt Readings from Last 3 Encounters:   07/13/24 84.1 kg (185 lb 6.5 oz)       Acute left flank pain  Assessment & Plan  Secondary to distended bladder  Since improved   Analgesia prn               VTE Pharmacologic Prophylaxis: VTE Score: 2     Mobility:   Basic Mobility Inpatient Raw Score: 24  JH-HLM Goal: 8: Walk 250 feet or more  JH-HLM Achieved: 8: Walk 250 feet ot more  JH-HLM Goal achieved. Continue to encourage appropriate mobility.    Patient Centered Rounds: I performed bedside rounds with nursing staff today.   Discussions with Specialists  or Other Care Team Provider: case management, urology    Education and Discussions with Family / Patient: Patient declined call to .     Total Time Spent on Date of Encounter in care of patient:  mins. This time was spent on one or more of the following: performing physical exam; counseling and coordination of care; obtaining or reviewing history; documenting in the medical record; reviewing/ordering tests, medications or procedures; communicating with other healthcare professionals and discussing with patient's family/caregivers.    Current Length of Stay: 0 day(s)  Current Patient Status: Observation   Certification Statement: The patient will continue to require additional inpatient hospital stay due to hematuria  Discharge Plan: Anticipate discharge in 24-48 hrs to home.    Code Status: Level 1 - Full Code    Subjective:   Patient seen and examined. Ongoing hematuria, intermittent left flank pain radiating in left abdomen. Denies fever, chills, nausea, vomiting.     Objective:     Vitals:   Temp (24hrs), Av.8 °F (36.6 °C), Min:97.5 °F (36.4 °C), Max:98.2 °F (36.8 °C)    Temp:  [97.5 °F (36.4 °C)-98.2 °F (36.8 °C)] 97.7 °F (36.5 °C)  HR:  [81-82] 82  Resp:  [16-17] 17  BP: ()/(63-73) 121/73  SpO2:  [94 %-95 %] 94 %  Body mass index is 29.04 kg/m².     Input and Output Summary (last 24 hours):     Intake/Output Summary (Last 24 hours) at 7/15/2024 1215  Last data filed at 2024 1730  Gross per 24 hour   Intake --   Output 1050 ml   Net -1050 ml       Physical Exam:   Physical Exam  HENT:      Head: Normocephalic and atraumatic.   Cardiovascular:      Rate and Rhythm: Normal rate and regular rhythm.   Pulmonary:      Effort: Pulmonary effort is normal.      Breath sounds: Normal breath sounds.   Abdominal:      General: Abdomen is flat. Bowel sounds are normal. There is no distension.      Palpations: Abdomen is soft.      Tenderness: There is no abdominal tenderness.   Musculoskeletal:       Right lower leg: No edema.      Left lower leg: No edema.      Comments: Dark red blood in brewer bag, no clots noted   Skin:     General: Skin is warm and dry.   Neurological:      General: No focal deficit present.      Mental Status: He is alert and oriented to person, place, and time.          Additional Data:     Labs:  Results from last 7 days   Lab Units 07/14/24  0430   WBC Thousand/uL 11.14*   HEMOGLOBIN g/dL 12.6   HEMATOCRIT % 37.9   PLATELETS Thousands/uL 151   SEGS PCT % 74   LYMPHO PCT % 16   MONO PCT % 9   EOS PCT % 1     Results from last 7 days   Lab Units 07/14/24  0430 07/13/24  0629 07/13/24  0111   SODIUM mmol/L 135   < > 137   POTASSIUM mmol/L 4.0   < > 4.0   CHLORIDE mmol/L 106   < > 105   CO2 mmol/L 22   < > 23   BUN mg/dL 15   < > 19   CREATININE mg/dL 1.07   < > 0.94   ANION GAP mmol/L 7   < > 9   CALCIUM mg/dL 8.6   < > 9.2   ALBUMIN g/dL  --   --  4.5   TOTAL BILIRUBIN mg/dL  --   --  0.39   ALK PHOS U/L  --   --  59   ALT U/L  --   --  12   AST U/L  --   --  13   GLUCOSE RANDOM mg/dL 166*   < > 243*    < > = values in this interval not displayed.     Results from last 7 days   Lab Units 07/13/24  0111   INR  0.91     Results from last 7 days   Lab Units 07/15/24  1115 07/15/24  0741 07/14/24  2100 07/14/24  1557 07/14/24  1113 07/14/24  0737 07/13/24  1620 07/13/24  1147 07/13/24  0750   POC GLUCOSE mg/dl 185* 164* 201* 183* 207* 174* 171* 162* 143*         Results from last 7 days   Lab Units 07/13/24  0111   LACTIC ACID mmol/L 2.0       Lines/Drains:  Invasive Devices       Peripheral Intravenous Line  Duration             Peripheral IV 07/13/24 Right;Ventral (anterior) Forearm 2 days              Drain  Duration             Urethral Catheter Three way 18 Fr. 2 days                  Urinary Catheter:  Goal for removal:  hematuria requiring flushes               Imaging: Reviewed radiology reports from this admission including: abdominal/pelvic CT    Recent Cultures (last 7 days):    Results from last 7 days   Lab Units 07/13/24  0108   URINE CULTURE  No Growth <1000 cfu/mL       Last 24 Hours Medication List:   Current Facility-Administered Medications   Medication Dose Route Frequency Provider Last Rate    acetaminophen  650 mg Oral Q6H PRN Renny Snider MD      atorvastatin  40 mg Oral After Dinner Renny Snider MD      carvedilol  25 mg Oral BID With Meals Renny Snider MD      docusate sodium  100 mg Oral BID Karissa Amado MD      finasteride  5 mg Oral Daily Renny Snider MD      insulin lispro  1-6 Units Subcutaneous TID AC Renny Snider MD      morphine injection  4 mg Intravenous Q4H PRN Renny Snider MD      nicotine  1 patch Transdermal Daily Renny Snider MD      oxyCODONE  5 mg Oral Q6H PRN Renny Snider MD      polyethylene glycol  17 g Oral Daily PRN Karissa Amado MD      sacubitril-valsartan  1 tablet Oral BID Renny Snider MD      senna  1 tablet Oral HS Karissa Amado MD      spironolactone  25 mg Oral Daily Renny Snider MD          Today, Patient Was Seen By: Samantha Zurita PA-C    **Please Note: This note may have been constructed using a voice recognition system.**

## 2024-07-15 NOTE — PLAN OF CARE
Problem: Potential for Falls  Goal: Patient will remain free of falls  Description: INTERVENTIONS:  - Educate patient/family on patient safety including physical limitations  - Instruct patient to call for assistance with activity   - Consult OT/PT to assist with strengthening/mobility   - Keep Call bell within reach  - Keep bed low and locked with side rails adjusted as appropriate  - Keep care items and personal belongings within reach  - Initiate and maintain comfort rounds  - Make Fall Risk Sign visible to staff  - Offer Toileting every    Hours, in advance of need  - Initiate/Maintain   alarm  - Obtain necessary fall risk management equipment:               - Apply yellow socks and bracelet for high fall risk patients  - Consider moving patient to room near nurses station  Outcome: Progressing     Problem: PAIN - ADULT  Goal: Verbalizes/displays adequate comfort level or baseline comfort level  Description: Interventions:  - Encourage patient to monitor pain and request assistance  - Assess pain using appropriate pain scale  - Administer analgesics based on type and severity of pain and evaluate response  - Implement non-pharmacological measures as appropriate and evaluate response  - Consider cultural and social influences on pain and pain management  - Notify physician/advanced practitioner if interventions unsuccessful or patient reports new pain  Outcome: Progressing     Problem: INFECTION - ADULT  Goal: Absence or prevention of progression during hospitalization  Description: INTERVENTIONS:  - Assess and monitor for signs and symptoms of infection  - Monitor lab/diagnostic results  - Monitor all insertion sites, i.e. indwelling lines, tubes, and drains  - Monitor endotracheal if appropriate and nasal secretions for changes in amount and color  - [place] appropriate cooling/warming therapies per order  - Administer medications as ordered  - Instruct and encourage patient and family to use good hand  hygiene technique  - Identify and instruct in appropriate isolation precautions for identified infection/condition  Outcome: Progressing  Goal: Absence of fever/infection during neutropenic period  Description: INTERVENTIONS:  - Monitor WBC    Outcome: Progressing     Problem: SAFETY ADULT  Goal: Patient will remain free of falls  Description: INTERVENTIONS:  - Educate patient/family on patient safety including physical limitations  - Instruct patient to call for assistance with activity   - Consult OT/PT to assist with strengthening/mobility   - Keep Call bell within reach  - Keep bed low and locked with side rails adjusted as appropriate  - Keep care items and personal belongings within reach  - Initiate and maintain comfort rounds  - Make Fall Risk Sign visible to staff  - Offer Toileting every    Hours, in advance of need  - Initiate/Maintain   alarm  - Obtain necessary fall risk management equipment:     - Apply yellow socks and bracelet for high fall risk patients  - Consider moving patient to room near nurses station  Outcome: Progressing  Goal: Maintain or return to baseline ADL function  Description: INTERVENTIONS:  -  Assess patient's ability to carry out ADLs; assess patient's baseline for ADL function and identify physical deficits which impact ability to perform ADLs (bathing, care of mouth/teeth, toileting, grooming, dressing, etc.)  - Assess/evaluate cause of self-care deficits   - Assess range of motion  - Assess patient's mobility; develop plan if impaired  - Assess patient's need for assistive devices and provide as appropriate  - Encourage maximum independence but intervene and supervise when necessary  - Involve family in performance of ADLs  - Assess for home care needs following discharge   - Consider OT consult to assist with ADL evaluation and planning for discharge  - Provide patient education as appropriate  Outcome: Progressing  Goal: Maintains/Returns to pre admission functional  level  Description: INTERVENTIONS:  - Perform AM-PAC 6 Click Basic Mobility/ Daily Activity assessment daily.  - Set and communicate daily mobility goal to care team and patient/family/caregiver.   - Collaborate with rehabilitation services on mobility goals if consulted  - Perform Range of Motion    times a day.  - Reposition patient every    hours.  - Dangle patient    times a day  - Stand patient    times a day  - Ambulate patient    times a day  - Out of bed to chair    times a day   - Out of bed for meals    times a day  - Out of bed for toileting  - Record patient progress and toleration of activity level   Outcome: Progressing     Problem: DISCHARGE PLANNING  Goal: Discharge to home or other facility with appropriate resources  Description: INTERVENTIONS:  - Identify barriers to discharge w/patient and caregiver  - Arrange for needed discharge resources and transportation as appropriate  - Identify discharge learning needs (meds, wound care, etc.)  - Arrange for interpretive services to assist at discharge as needed  - Refer to Case Management Department for coordinating discharge planning if the patient needs post-hospital services based on physician/advanced practitioner order or complex needs related to functional status, cognitive ability, or social support system  Outcome: Progressing     Problem: Knowledge Deficit  Goal: Patient/family/caregiver demonstrates understanding of disease process, treatment plan, medications, and discharge instructions  Description: Complete learning assessment and assess knowledge base.  Interventions:  - Provide teaching at level of understanding  - Provide teaching via preferred learning methods  Outcome: Progressing     Problem: GENITOURINARY - ADULT  Goal: Maintains or returns to baseline urinary function  Description: INTERVENTIONS:  - Assess urinary function  - Encourage oral fluids to ensure adequate hydration if ordered  - Administer IV fluids as ordered to ensure  adequate hydration  - Administer ordered medications as needed  - Offer frequent toileting  - Follow urinary retention protocol if ordered  Outcome: Progressing  Goal: Absence of urinary retention  Description: INTERVENTIONS:  - Assess patient's ability to void and empty bladder  - Monitor I/O  - Bladder scan as needed  - Discuss with physician/AP medications to alleviate retention as needed  - Discuss catheterization for long term situations as appropriate  Outcome: Progressing  Goal: Urinary catheter remains patent  Description: INTERVENTIONS:  - Assess patency of urinary catheter  - If patient has a chronic brewer, consider changing catheter if non-functioning  - Follow guidelines for intermittent irrigation of non-functioning urinary catheter  Outcome: Progressing

## 2024-07-15 NOTE — CASE MANAGEMENT
Case Management Discharge Planning Note    Patient name Jw Ray  Location /-01 MRN [id number]  : 1965 Date 7/15/2024       Current Admission Date: 2024  Current Admission Diagnosis:Gross hematuria   Patient Active Problem List    Diagnosis Date Noted Date Diagnosed    Benign prostatic hyperplasia with urinary retention 2024     Acute left flank pain 2023     Gross hematuria 2023     Pure hypercholesterolemia 2023     Chronic combined systolic and diastolic heart failure (HCC) 2022     Vitamin D deficiency 2020     Presence of stent in coronary artery 2020     Ischemic cardiomyopathy 10/29/2018     Coronary artery disease due to lipid rich plaque 2013     Essential hypertension 2013     Type 2 diabetes mellitus (HCC) 2011     Long term current use of anticoagulant therapy 12/15/2010       LOS (days): 0  Geometric Mean LOS (GMLOS) (days):   Days to GMLOS:     OBJECTIVE:            Current admission status: Observation   Preferred Pharmacy:   [facility] [address] [address]  Phone: [phone] Fax: [phone]    [facility] [address] [place] [address] [address] [facility]  [address] [facility] [address]  Phone: [phone] Fax: [phone]    Primary Care Provider: No primary care provider on file.    Primary Insurance: COMMERCIAL MISCELLANEOUS  Secondary Insurance:     DISCHARGE DETAILS:     CM received message from provider that patient was requesting a transfer to [facility] facility as [facility] is not in network of insurance.    CM reviewed patient's chart and patient has no insurance listed.     CM called patient and he will have his wife bring insurance cards in.     Patient's bedside nurse provided CM copies of patient's insurance.    CM reached out to insurance verification regarding patient. They added patient's insurance to  chart. If patient would like to find out if hospital is in network, they would need to call the number on the back of the card.     CM to follow patient's care and discharge needs.

## 2024-07-15 NOTE — PROGRESS NOTES
e-Consult ([facility]) PROGRESS NOTE  Jw Ray 59 y.o. male MRN: [id number]  Unit/Bed#: -01 Encounter: [id number]      Reason for Consult / Principal Problem: gross hematuria    07/15/24    Remote note via chart review, secure chat discussion with on site primary provider RANDY and TR. I did not personally see or examine this patient on this date.      ASSESSMENT:  Gross hematuria left flank pain nephrolithiasis  The noncontrast CT 7/13 shows there is suspected blood product in the left renal collecting system and proximal ureter this is consistent with the constellation of symptoms of colicky left flank pain while passing this material and seeing hematuria. There was no notable stone anywhere in the urinary tract. Numerous renal cysts several hemorrhagic cysts. No notable clot burden in the lower urinary tract on those original scans. He has an 18Fr 3 way indwelling urethral brewer catheter ongoing manual irrigations and has intermittently cleared to brennan/pink is reported more red again today. Is currently clear light red. Left flank pain has resolved. Reports the catheter causes him some bladder spasms and pain. No fevers or chills.    RECOMMENDATIONS:  CT urogram (ct abdomen pelvis w/wo/delays contrast phases) to best assess for hematuria source i.e. possibility of upper tract tumor renal pelvis/ureter or if there is communication of a hemorrhagic cyst to the collecting system    continue manually irrigating the brewer catheter every 4 hours and as needed for hematuria. does not appear to require continuous bladder irrigation at this time. this may or may be helpful since suspected bleeding was upper tract not lower tract but again will re-evaluate what his current CT urogram looks like today.    upper tract interorrgation with cystoscopy retrograde pyelogram/ureteroscopy pending CTU results. timing to be determined - generally accomplished as outpatient procedure.     21-30 minutes, >50% of the total  time devoted to medical consultative verbal/EMR discussion between providers. Written report will be generated in the EMR.    Mary Woody PA-C

## 2024-07-15 NOTE — ASSESSMENT & PLAN NOTE
Continue SSI & acu-checks ac.hs  Hold oral medications    Lab Results   Component Value Date    HGBA1C 9.1 (H) 01/10/2024

## 2024-07-15 NOTE — PROGRESS NOTES
Progress note    Agree with PA notes.  Scans reviewed.  Patient examined.    Patient's had an extensive workup for gross hematuria and previous issues with blood product like debris within the left collecting system.  Patient presented with left flank pain blood products in the left collecting system and urinary retention likely secondary to obstruction from blood clots migrating to bladder.  Has seen Janelle Valdez at [facility] multiple times.  Wants to continue his care there.  He has had multiple procedures there as well urologically.  Patient understands the need for further urologic evaluation given his gross hematuria.  He is also on aspirin on a daily basis but is unclear as to why.  It appears as though this has been discontinued since hospitalization.  I did suggest that he stay off of it unless his cardiologist tells him it is a necessity.    Urine is clearing.  Hemoglobin is satisfactory and stable at 12.2.  Creatinine is normal.  No flank tenderness on exam today.    Patient's hematuria related to some bleeding from left pyelocalyceal system likely.  Previous evaluations have not shown anything of significance.  Would discontinue aspirin for now.  No evidence of ongoing bleeding at this point.  Present clot seen in urine are from the upper collecting system now passing.  Continue irrigations to help remove any clot within bladder and possible void trial tomorrow with Green removal.  Patient understands he needs to return to [facility] to see his urologist for further evaluation.

## 2024-07-15 NOTE — ASSESSMENT & PLAN NOTE
Follows with [place] cardiology  Continue Entresto, Coreg and Aldactone    Wt Readings from Last 3 Encounters:   07/13/24 84.1 kg (185 lb 6.5 oz)

## 2024-07-15 NOTE — ASSESSMENT & PLAN NOTE
Withhold aspirin for now due to hematuria  He does have a drug-eluting stent to the proximal segment of the OM 3

## 2024-07-16 VITALS
HEART RATE: 80 BPM | SYSTOLIC BLOOD PRESSURE: 112 MMHG | WEIGHT: 185.41 LBS | HEIGHT: 67 IN | TEMPERATURE: 97.7 F | RESPIRATION RATE: 18 BRPM | DIASTOLIC BLOOD PRESSURE: 69 MMHG | BODY MASS INDEX: 29.1 KG/M2 | OXYGEN SATURATION: 93 %

## 2024-07-16 PROBLEM — R10.9 ACUTE LEFT FLANK PAIN: Status: RESOLVED | Noted: 2023-07-01 | Resolved: 2024-07-16

## 2024-07-16 LAB
ANION GAP SERPL CALCULATED.3IONS-SCNC: 7 MMOL/L (ref 4–13)
BASOPHILS # BLD AUTO: 0.03 THOUSANDS/ÂΜL (ref 0–0.1)
BASOPHILS NFR BLD AUTO: 1 % (ref 0–1)
BUN SERPL-MCNC: 20 MG/DL (ref 5–25)
CALCIUM SERPL-MCNC: 8.8 MG/DL (ref 8.4–10.2)
CHLORIDE SERPL-SCNC: 104 MMOL/L (ref 96–108)
CO2 SERPL-SCNC: 25 MMOL/L (ref 21–32)
CREAT SERPL-MCNC: 1.02 MG/DL (ref 0.6–1.3)
EOSINOPHIL # BLD AUTO: 0.15 THOUSAND/ÂΜL (ref 0–0.61)
EOSINOPHIL NFR BLD AUTO: 2 % (ref 0–6)
ERYTHROCYTE [DISTWIDTH] IN BLOOD BY AUTOMATED COUNT: 12.2 % (ref 11.6–15.1)
GFR SERPL CREATININE-BSD FRML MDRD: 80 ML/MIN/1.73SQ M
GLUCOSE SERPL-MCNC: 152 MG/DL (ref 65–140)
GLUCOSE SERPL-MCNC: 191 MG/DL (ref 65–140)
GLUCOSE SERPL-MCNC: 203 MG/DL (ref 65–140)
HCT VFR BLD AUTO: 36.6 % (ref 36.5–49.3)
HGB BLD-MCNC: 12.3 G/DL (ref 12–17)
IMM GRANULOCYTES # BLD AUTO: 0.02 THOUSAND/UL (ref 0–0.2)
IMM GRANULOCYTES NFR BLD AUTO: 0 % (ref 0–2)
LYMPHOCYTES # BLD AUTO: 2.17 THOUSANDS/ÂΜL (ref 0.6–4.47)
LYMPHOCYTES NFR BLD AUTO: 34 % (ref 14–44)
MCH RBC QN AUTO: 29.9 PG (ref 26.8–34.3)
MCHC RBC AUTO-ENTMCNC: 33.6 G/DL (ref 31.4–37.4)
MCV RBC AUTO: 89 FL (ref 82–98)
MONOCYTES # BLD AUTO: 0.54 THOUSAND/ÂΜL (ref 0.17–1.22)
MONOCYTES NFR BLD AUTO: 9 % (ref 4–12)
NEUTROPHILS # BLD AUTO: 3.42 THOUSANDS/ÂΜL (ref 1.85–7.62)
NEUTS SEG NFR BLD AUTO: 54 % (ref 43–75)
NRBC BLD AUTO-RTO: 0 /100 WBCS
PLATELET # BLD AUTO: 166 THOUSANDS/UL (ref 149–390)
PMV BLD AUTO: 10.3 FL (ref 8.9–12.7)
POTASSIUM SERPL-SCNC: 4 MMOL/L (ref 3.5–5.3)
RBC # BLD AUTO: 4.11 MILLION/UL (ref 3.88–5.62)
SODIUM SERPL-SCNC: 136 MMOL/L (ref 135–147)
WBC # BLD AUTO: 6.33 THOUSAND/UL (ref 4.31–10.16)

## 2024-07-16 PROCEDURE — 99239 HOSP IP/OBS DSCHRG MGMT >30: CPT

## 2024-07-16 PROCEDURE — 99232 SBSQ HOSP IP/OBS MODERATE 35: CPT | Performed by: UROLOGY

## 2024-07-16 PROCEDURE — 82948 REAGENT STRIP/BLOOD GLUCOSE: CPT

## 2024-07-16 PROCEDURE — 80048 BASIC METABOLIC PNL TOTAL CA: CPT

## 2024-07-16 PROCEDURE — 85025 COMPLETE CBC W/AUTO DIFF WBC: CPT

## 2024-07-16 RX ORDER — CEFUROXIME AXETIL 500 MG/1
500 TABLET ORAL EVERY 12 HOURS SCHEDULED
Qty: 10 TABLET | Refills: 0 | Status: SHIPPED | OUTPATIENT
Start: 2024-07-16 | End: 2024-07-21

## 2024-07-16 RX ORDER — NICOTINE 21 MG/24HR
1 PATCH, TRANSDERMAL 24 HOURS TRANSDERMAL DAILY
Qty: 28 PATCH | Refills: 0 | Status: SHIPPED | OUTPATIENT
Start: 2024-07-17

## 2024-07-16 RX ORDER — PHENAZOPYRIDINE HYDROCHLORIDE 100 MG/1
100 TABLET, FILM COATED ORAL 3 TIMES DAILY PRN
Qty: 10 TABLET | Refills: 0 | Status: SHIPPED | OUTPATIENT
Start: 2024-07-16

## 2024-07-16 RX ORDER — CEFUROXIME AXETIL 250 MG/1
500 TABLET ORAL EVERY 12 HOURS SCHEDULED
Status: DISCONTINUED | OUTPATIENT
Start: 2024-07-16 | End: 2024-07-16 | Stop reason: HOSPADM

## 2024-07-16 RX ORDER — PHENAZOPYRIDINE HYDROCHLORIDE 100 MG/1
100 TABLET, FILM COATED ORAL
Status: DISCONTINUED | OUTPATIENT
Start: 2024-07-16 | End: 2024-07-16 | Stop reason: HOSPADM

## 2024-07-16 RX ORDER — FINASTERIDE 5 MG/1
5 TABLET, FILM COATED ORAL DAILY
Qty: 30 TABLET | Refills: 0 | Status: SHIPPED | OUTPATIENT
Start: 2024-07-17

## 2024-07-16 RX ADMIN — SPIRONOLACTONE 25 MG: 25 TABLET ORAL at 08:11

## 2024-07-16 RX ADMIN — NICOTINE 1 PATCH: 14 PATCH, EXTENDED RELEASE TRANSDERMAL at 08:13

## 2024-07-16 RX ADMIN — INSULIN LISPRO 2 UNITS: 100 INJECTION, SOLUTION INTRAVENOUS; SUBCUTANEOUS at 12:10

## 2024-07-16 RX ADMIN — CARVEDILOL 25 MG: 12.5 TABLET, FILM COATED ORAL at 08:11

## 2024-07-16 RX ADMIN — FINASTERIDE 5 MG: 5 TABLET, FILM COATED ORAL at 08:11

## 2024-07-16 RX ADMIN — OXYBUTYNIN CHLORIDE 5 MG: 5 TABLET ORAL at 08:11

## 2024-07-16 RX ADMIN — SACUBITRIL AND VALSARTAN 1 TABLET: 49; 51 TABLET, FILM COATED ORAL at 08:11

## 2024-07-16 RX ADMIN — OXYCODONE HYDROCHLORIDE 5 MG: 5 TABLET ORAL at 06:39

## 2024-07-16 RX ADMIN — METHOCARBAMOL TABLETS 500 MG: 500 TABLET, COATED ORAL at 14:48

## 2024-07-16 RX ADMIN — CEFUROXIME AXETIL 500 MG: 250 TABLET ORAL at 12:43

## 2024-07-16 RX ADMIN — DOCUSATE SODIUM 100 MG: 100 CAPSULE, LIQUID FILLED ORAL at 08:11

## 2024-07-16 RX ADMIN — INSULIN LISPRO 2 UNITS: 100 INJECTION, SOLUTION INTRAVENOUS; SUBCUTANEOUS at 08:12

## 2024-07-16 NOTE — PLAN OF CARE
Problem: Potential for Falls  Goal: Patient will remain free of falls  Description: INTERVENTIONS:  - Educate patient/family on patient safety including physical limitations  - Instruct patient to call for assistance with activity   - Consult OT/PT to assist with strengthening/mobility   - Keep Call bell within reach  - Keep bed low and locked with side rails adjusted as appropriate  - Keep care items and personal belongings within reach  - Initiate and maintain comfort rounds  - Make Fall Risk Sign visible to staff  - Apply yellow socks and bracelet for high fall risk patients  - Consider moving patient to room near nurses station  Outcome: Adequate for Discharge     Problem: PAIN - ADULT  Goal: Verbalizes/displays adequate comfort level or baseline comfort level  Description: Interventions:  - Encourage patient to monitor pain and request assistance  - Assess pain using appropriate pain scale  - Administer analgesics based on type and severity of pain and evaluate response  - Implement non-pharmacological measures as appropriate and evaluate response  - Consider cultural and social influences on pain and pain management  - Notify physician/advanced practitioner if interventions unsuccessful or patient reports new pain  Outcome: Adequate for Discharge     Problem: INFECTION - ADULT  Goal: Absence or prevention of progression during hospitalization  Description: INTERVENTIONS:  - Assess and monitor for signs and symptoms of infection  - Monitor lab/diagnostic results  - Monitor all insertion sites, i.e. indwelling lines, tubes, and drains  - Monitor endotracheal if appropriate and nasal secretions for changes in amount and color  - [place] appropriate cooling/warming therapies per order  - Administer medications as ordered  - Instruct and encourage patient and family to use good hand hygiene technique  - Identify and instruct in appropriate isolation precautions for identified infection/condition  Outcome:  Adequate for Discharge  Goal: Absence of fever/infection during neutropenic period  Description: INTERVENTIONS:  - Monitor WBC    Outcome: Adequate for Discharge     Problem: SAFETY ADULT  Goal: Patient will remain free of falls  Description: INTERVENTIONS:  - Educate patient/family on patient safety including physical limitations  - Instruct patient to call for assistance with activity   - Consult OT/PT to assist with strengthening/mobility   - Keep Call bell within reach  - Keep bed low and locked with side rails adjusted as appropriate  - Keep care items and personal belongings within reach  - Initiate and maintain comfort rounds  - Apply yellow socks and bracelet for high fall risk patients  - Consider moving patient to room near nurses station  Outcome: Adequate for Discharge  Goal: Maintain or return to baseline ADL function  Description: INTERVENTIONS:  -  Assess patient's ability to carry out ADLs; assess patient's baseline for ADL function and identify physical deficits which impact ability to perform ADLs (bathing, care of mouth/teeth, toileting, grooming, dressing, etc.)  - Assess/evaluate cause of self-care deficits   - Assess range of motion  - Assess patient's mobility; develop plan if impaired  - Assess patient's need for assistive devices and provide as appropriate  - Encourage maximum independence but intervene and supervise when necessary  - Involve family in performance of ADLs  - Assess for home care needs following discharge   - Consider OT consult to assist with ADL evaluation and planning for discharge  - Provide patient education as appropriate  Outcome: Adequate for Discharge  Goal: Maintains/Returns to pre admission functional level  Description: INTERVENTIONS:  - Perform AM-PAC 6 Click Basic Mobility/ Daily Activity assessment daily.  - Set and communicate daily mobility goal to care team and patient/family/caregiver.   - Collaborate with rehabilitation services on mobility goals if  consulted  - Perform Range of Motion 3 times a day.  - Reposition patient every 2 hours.  - Dangle patient 3 times a day  - Stand patient 3 times a day  - Ambulate patient 3 times a day  - Out of bed to chair 3 times a day   - Out of bed for meals 3 times a day  - Out of bed for toileting  - Record patient progress and toleration of activity level   Outcome: Adequate for Discharge     Problem: DISCHARGE PLANNING  Goal: Discharge to home or other facility with appropriate resources  Description: INTERVENTIONS:  - Identify barriers to discharge w/patient and caregiver  - Arrange for needed discharge resources and transportation as appropriate  - Identify discharge learning needs (meds, wound care, etc.)  - Arrange for interpretive services to assist at discharge as needed  - Refer to Case Management Department for coordinating discharge planning if the patient needs post-hospital services based on physician/advanced practitioner order or complex needs related to functional status, cognitive ability, or social support system  Outcome: Adequate for Discharge     Problem: Knowledge Deficit  Goal: Patient/family/caregiver demonstrates understanding of disease process, treatment plan, medications, and discharge instructions  Description: Complete learning assessment and assess knowledge base.  Interventions:  - Provide teaching at level of understanding  - Provide teaching via preferred learning methods  Outcome: Adequate for Discharge     Problem: GENITOURINARY - ADULT  Goal: Maintains or returns to baseline urinary function  Description: INTERVENTIONS:  - Assess urinary function  - Encourage oral fluids to ensure adequate hydration if ordered  - Administer IV fluids as ordered to ensure adequate hydration  - Administer ordered medications as needed  - Offer frequent toileting  - Follow urinary retention protocol if ordered  Outcome: Adequate for Discharge  Goal: Absence of urinary retention  Description:  INTERVENTIONS:  - Assess patient's ability to void and empty bladder  - Monitor I/O  - Bladder scan as needed  - Discuss with physician/AP medications to alleviate retention as needed  - Discuss catheterization for long term situations as appropriate  Outcome: Adequate for Discharge  Goal: Urinary catheter remains patent  Description: INTERVENTIONS:  - Assess patency of urinary catheter  - If patient has a chronic brewer, consider changing catheter if non-functioning  - Follow guidelines for intermittent irrigation of non-functioning urinary catheter  Outcome: Adequate for Discharge

## 2024-07-16 NOTE — PROGRESS NOTES
Progress Note - Jw Ray 59 y.o. male MRN: [id number]    Unit/Bed#: -01 Encounter: [id number]      Assessment:  Patient with gross hematuria secondary to left upper collecting system bleeding which has resolved off aspirin.  Patient has had previous episodes of this which were investigated at [facility] most recently 6 months ago and no significant source identified.  CT renal from yesterday reviewed.  Some clot remaining in left pyelocalyceal system but no significant clot in bladder at this point.  Patient's hemoglobin is satisfactory and stable times days indicating no ongoing bleeding.    Plan:  Patient's present hematuria is related to old clot within the left collecting system which needs to breakdown and pass.  This is a process which takes at least days.  I would recommend that the patient remain off aspirin and contact his cardiologist regarding this.  We are unable to assess the left collecting system endoscopically until the clot has cleared.  There is no way endoscopically to remove the clot.  Urokinase in the urine will break the clot down and it eventually will pass.  At that point the collecting system can be inspected and evaluated appropriately.  The patient denies any flank pain.  A stent would not be of much benefit either.  I discussed the above at length with the patient.  He wants to return to see his urology team at [facility] who know him well and apparently is more cost effective for him from an insurance standpoint.  If he is voiding, he can be discharged.  He will have hematuria related to breakdown of old blood products in the left collecting system which are going to pass.  He understands that he should go to the emergency room if he develops inability to void, left flank pain or significant rebleeding.  His questions were answered.  He should be discharged on an antibiotic for 5 days due to the Green catheter being in place.  Ceftin 500 mg twice daily for 5 days would be  "appropriate.      Subjective:   Patient denies any flank pain.  Green catheter was removed recently.  Waiting to void.  No lightheadedness or dizziness.  Has had good urine output.    Objective:     Vitals: Blood pressure 112/69, pulse 80, temperature 97.7 °F (36.5 °C), resp. rate 18, height 5' 7\" (1.702 m), weight 84.1 kg (185 lb 6.5 oz), SpO2 93%.,Body mass index is 29.04 kg/m².      Intake/Output Summary (Last 24 hours) at 7/16/2024 1223  Last data filed at 7/16/2024 1118  Gross per 24 hour   Intake 300 ml   Output 3400 ml   Net -3100 ml       Physical Exam:  Const: Appears healthy and well developed. No signs of acute distress present.  Resp: Respirations are regular and unlabored.   CV: Rate is regular. Rhythm is regular.  Abdomen: Abdomen is soft, nontender, and nondistended. Kidneys are not palpable.  : External genitalia normal bladder not percussible or tender no flank tenderness whatsoever.  No mass.  Psych: Patient's attitude is cooperative. Mood is normal. Affect is normal.    Invasive Devices       Peripheral Intravenous Line  Duration             Peripheral IV 07/13/24 Right;Ventral (anterior) Forearm 3 days                    Lab, Imaging and other studies: I have personally reviewed pertinent reports.     "

## 2024-07-16 NOTE — DISCHARGE SUMMARY
[facility]  Discharge- Jw Ray 1965, 59 y.o. male MRN: [id number]  Unit/Bed#: -[facility]bel Encounter: [id number]  Primary Care Provider: Thanh Echeverria MD   Date and time admitted to hospital: 7/13/2024 12:42 AM    * Gross hematuria  Assessment & Plan  History of gross hematuria with an extensive outpatient workup that was negative.  CT urogram and cystoscopy on outpatient revealed no abnormalities, [facility]  Initially declining workup here given insurance, however since remedied and agreeable to ongoing management  Appreciate ongoing urology recs  CT renal protocol ordered - Persistent distention of the left renal pelvis and proximal ureter by high density material, which is suspected to result in a degree of obstruction given delayed nephrogram and absence of contrast excretion. While this is likely at least in part due to blood products, cannot exclude the presence of an upper tract urothelial neoplasm.  Trend H&H. Transfuse as indicated.   Has been stable in normal range  Lab Results   Component Value Date    HGB 12.3 07/16/2024    HGB 12.2 07/15/2024    HGB 12.6 07/14/2024     Continue Proscar, Flomax  CBI held with concern for source from upper tract   Urology following   Remove brewer and void trial   OP follow up with urology         Benign prostatic hyperplasia with urinary retention  Assessment & Plan  Flomax and Proscar    Type 2 diabetes mellitus (HCC)  Assessment & Plan  Continue SSI & acu-checks ac.hs  Hold oral medications    Lab Results   Component Value Date    HGBA1C 9.1 (H) 01/10/2024       Pure hypercholesterolemia  Assessment & Plan  Continue statin    Essential hypertension  Assessment & Plan  Continue Coreg, Entresto and Aldactone    Coronary artery disease due to lipid rich plaque  Assessment & Plan  Withhold aspirin for now due to hematuria  He does have a drug-eluting stent to the proximal segment of the OM 3 placed  2020  Recommend discussion with his outpatient cardiologist on if he should continue aspirin with risk of bleeding    Chronic combined systolic and diastolic heart failure (HCC)  Assessment & Plan  Follows with [place] cardiology  Continue Entresto, Coreg and Aldactone    Wt Readings from Last 3 Encounters:   07/13/24 84.1 kg (185 lb 6.5 oz)       Acute left flank pain-resolved as of 7/16/2024  Assessment & Plan  Secondary to distended bladder  Since resolved   Analgesia prn        Medical Problems       Resolved Problems  Date Reviewed: 7/14/2024            Resolved    Acute left flank pain 7/16/2024     Resolved by  Frida Bravo PA-C        Discharging Physician / Practitioner: Frida Bravo PA-C  PCP: Thanh Echeverria MD  Admission Date:   Admission Orders (From admission, onward)       Ordered        07/15/24 1222  INPATIENT ADMISSION  Once            07/13/24 0420  Place in Observation  Once                          Discharge Date: 07/16/24    Consultations During Hospital Stay:  Urology     Procedures Performed:   CBI    Significant Findings / Test Results:   CT renal protocol -result date 7/15/2024  Persistent distention of the left renal pelvis and proximal ureter by high density material, which is suspected to result in a degree of obstruction given delayed nephrogram and absence of contrast excretion. While this is likely at least in part due to blood products, cannot exclude the presence of an upper tract urothelial neoplasm.    CT abdomen pelvis wo contrast - Result Date: 7/13/2024  High density contents of the left renal collecting system and proximal left ureter indicate hematuria. Numerous predominately subcentimeter bilateral renal cortical lesions likely to represent hemorrhagic cysts. These were described on prior outside CTs. Prostamegaly protruding into the bladder base with prominent bladder distention. No bladder wall thickening.     Incidental Findings:   None     Test Results Pending at  "Discharge (will require follow up):   None     Outpatient Tests Requested:  None    Complications:  None    Reason for Admission: Gross hematuria    Hospital Course:   Jw Ray is a 59 y.o. male patient with history of recurrent hematuria, heart failure, diabetes, hypertension who originally presented to the hospital on 7/13/2024 due to hematuria x 6 days.  Then developed abdominal pain/left flank pain and difficulty urinating so presented to the ED. Green catheter was in place and manual irrigation was started.  Urology was consulted.  Outpatient workup thus far has been negative CT renal protocol done which shows possible.  Upper urinary tract bleeding.  Urology recommending voiding trial and if patient urinating can be discharged.  Does still have some hematuria however will take several days to resolve and hemoglobin has remained stable.  His aspirin will be held on discharge she will follow-up with cardiologist to advise on that.     Please see above list of diagnoses and related plan for additional information.     Condition at Discharge: stable    Discharge Day Visit / Exam:   Subjective: Seen and examined.  Denies any further abdominal pain or flank pain.   Vitals: Blood Pressure: 112/69 (07/16/24 0754)  Pulse: 80 (07/16/24 0754)  Temperature: 97.7 °F (36.5 °C) (07/16/24 0754)  Temp Source: Temporal (07/13/24 2100)  Respirations: 18 (07/16/24 0754)  Height: 5' 7\" (170.2 cm) (07/13/24 0540)  Weight - Scale: 84.1 kg (185 lb 6.5 oz) (07/13/24 0540)  SpO2: 93 % (07/16/24 0830)  Exam:   Physical Exam  Vitals and nursing note reviewed.   Constitutional:       General: He is not in acute distress.     Appearance: Normal appearance.   HENT:      Head: Normocephalic and atraumatic.      Nose: No congestion.      Mouth/Throat:      Mouth: Mucous membranes are moist.   Eyes:      Conjunctiva/sclera: Conjunctivae normal.   Cardiovascular:      Rate and Rhythm: Normal rate and regular rhythm.      Pulses: " Normal pulses.      Heart sounds: Normal heart sounds. No murmur heard.  Pulmonary:      Effort: Pulmonary effort is normal. No respiratory distress.      Breath sounds: Normal breath sounds.   Abdominal:      General: Bowel sounds are normal.      Palpations: Abdomen is soft.      Tenderness: There is no abdominal tenderness.   Genitourinary:     Comments: Green in place with red urine   Musculoskeletal:         General: Normal range of motion.      Right lower leg: No edema.      Left lower leg: No edema.   Skin:     General: Skin is warm and dry.   Neurological:      Mental Status: He is alert and oriented to person, place, and time.          Discussion with Family: Patient declined call to .     Discharge instructions/Information to patient and family:   See after visit summary for information provided to patient and family.      Provisions for Follow-Up Care:  See after visit summary for information related to follow-up care and any pertinent home health orders.      Mobility at time of Discharge:   Basic Mobility Inpatient Raw Score: 24  JH-HLM Goal: 8: Walk 250 feet or more  JH-HLM Achieved: 8: Walk 250 feet ot more  HLM Goal achieved. Continue to encourage appropriate mobility.     Disposition:   Home    Planned Readmission: None     Discharge Statement:  I spent 45 minutes discharging the patient. This time was spent on the day of discharge. I had direct contact with the patient on the day of discharge. Greater than 50% of the total time was spent examining patient, answering all patient questions, arranging and discussing plan of care with patient as well as directly providing post-discharge instructions.  Additional time then spent on discharge activities.    Discharge Medications:  See after visit summary for reconciled discharge medications provided to patient and/or family.      **Please Note: This note may have been constructed using a voice recognition system**

## 2024-07-16 NOTE — NURSING NOTE
Bedside and Verbal shift change report given to self (oncoming nurse) by Jaja Brennan RN (offgoing nurse). Report included the following information SBAR, Kardex, MAR and Recent Results. Iv removed. AVS reviewed with patient. All questions answered.

## 2024-07-16 NOTE — PLAN OF CARE
Problem: Potential for Falls  Goal: Patient will remain free of falls  Description: INTERVENTIONS:  - Educate patient/family on patient safety including physical limitations  - Instruct patient to call for assistance with activity   - Consult OT/PT to assist with strengthening/mobility   - Keep Call bell within reach  - Keep bed low and locked with side rails adjusted as appropriate  - Keep care items and personal belongings within reach  - Initiate and maintain comfort rounds  - Make Fall Risk Sign visible to staff  - Offer Toileting every 2 Hours, in advance of need  - Initiate/Maintain alarm  - Obtain necessary fall risk management equipment:   - Apply yellow socks and bracelet for high fall risk patients  - Consider moving patient to room near nurses station  Outcome: Progressing     Problem: PAIN - ADULT  Goal: Verbalizes/displays adequate comfort level or baseline comfort level  Description: Interventions:  - Encourage patient to monitor pain and request assistance  - Assess pain using appropriate pain scale  - Administer analgesics based on type and severity of pain and evaluate response  - Implement non-pharmacological measures as appropriate and evaluate response  - Consider cultural and social influences on pain and pain management  - Notify physician/advanced practitioner if interventions unsuccessful or patient reports new pain  Outcome: Progressing     Problem: INFECTION - ADULT  Goal: Absence or prevention of progression during hospitalization  Description: INTERVENTIONS:  - Assess and monitor for signs and symptoms of infection  - Monitor lab/diagnostic results  - Monitor all insertion sites, i.e. indwelling lines, tubes, and drains  - Monitor endotracheal if appropriate and nasal secretions for changes in amount and color  - [place] appropriate cooling/warming therapies per order  - Administer medications as ordered  - Instruct and encourage patient and family to use good hand hygiene  technique  - Identify and instruct in appropriate isolation precautions for identified infection/condition  Outcome: Progressing  Goal: Absence of fever/infection during neutropenic period  Description: INTERVENTIONS:  - Monitor WBC    Outcome: Progressing     Problem: SAFETY ADULT  Goal: Patient will remain free of falls  Description: INTERVENTIONS:  - Educate patient/family on patient safety including physical limitations  - Instruct patient to call for assistance with activity   - Consult OT/PT to assist with strengthening/mobility   - Keep Call bell within reach  - Keep bed low and locked with side rails adjusted as appropriate  - Keep care items and personal belongings within reach  - Initiate and maintain comfort rounds  - Make Fall Risk Sign visible to staff  - Offer Toileting every 2 Hours, in advance of need  - Initiate/Maintain alarm  - Obtain necessary fall risk management equipment:   - Apply yellow socks and bracelet for high fall risk patients  - Consider moving patient to room near nurses station  Outcome: Progressing  Goal: Maintain or return to baseline ADL function  Description: INTERVENTIONS:  -  Assess patient's ability to carry out ADLs; assess patient's baseline for ADL function and identify physical deficits which impact ability to perform ADLs (bathing, care of mouth/teeth, toileting, grooming, dressing, etc.)  - Assess/evaluate cause of self-care deficits   - Assess range of motion  - Assess patient's mobility; develop plan if impaired  - Assess patient's need for assistive devices and provide as appropriate  - Encourage maximum independence but intervene and supervise when necessary  - Involve family in performance of ADLs  - Assess for home care needs following discharge   - Consider OT consult to assist with ADL evaluation and planning for discharge  - Provide patient education as appropriate  Outcome: Progressing  Goal: Maintains/Returns to pre admission functional level  Description:  INTERVENTIONS:  - Perform AM-PAC 6 Click Basic Mobility/ Daily Activity assessment daily.  - Set and communicate daily mobility goal to care team and patient/family/caregiver.   - Collaborate with rehabilitation services on mobility goals if consulted  - Perform Range of Motion 3 times a day.  - Reposition patient every 2 hours.  - Dangle patient 3 times a day  - Stand patient 3 times a day  - Ambulate patient 3 times a day  - Out of bed to chair 3 times a day   - Out of bed for meals 3 times a day  - Out of bed for toileting  - Record patient progress and toleration of activity level   Outcome: Progressing     Problem: DISCHARGE PLANNING  Goal: Discharge to home or other facility with appropriate resources  Description: INTERVENTIONS:  - Identify barriers to discharge w/patient and caregiver  - Arrange for needed discharge resources and transportation as appropriate  - Identify discharge learning needs (meds, wound care, etc.)  - Arrange for interpretive services to assist at discharge as needed  - Refer to Case Management Department for coordinating discharge planning if the patient needs post-hospital services based on physician/advanced practitioner order or complex needs related to functional status, cognitive ability, or social support system  Outcome: Progressing     Problem: Knowledge Deficit  Goal: Patient/family/caregiver demonstrates understanding of disease process, treatment plan, medications, and discharge instructions  Description: Complete learning assessment and assess knowledge base.  Interventions:  - Provide teaching at level of understanding  - Provide teaching via preferred learning methods  Outcome: Progressing     Problem: GENITOURINARY - ADULT  Goal: Maintains or returns to baseline urinary function  Description: INTERVENTIONS:  - Assess urinary function  - Encourage oral fluids to ensure adequate hydration if ordered  - Administer IV fluids as ordered to ensure adequate hydration  -  Administer ordered medications as needed  - Offer frequent toileting  - Follow urinary retention protocol if ordered  Outcome: Progressing  Goal: Absence of urinary retention  Description: INTERVENTIONS:  - Assess patient's ability to void and empty bladder  - Monitor I/O  - Bladder scan as needed  - Discuss with physician/AP medications to alleviate retention as needed  - Discuss catheterization for long term situations as appropriate  Outcome: Progressing  Goal: Urinary catheter remains patent  Description: INTERVENTIONS:  - Assess patency of urinary catheter  - If patient has a chronic brewer, consider changing catheter if non-functioning  - Follow guidelines for intermittent irrigation of non-functioning urinary catheter  Outcome: Progressing

## 2024-07-16 NOTE — ASSESSMENT & PLAN NOTE
Follows with [facility]  Continue Entresto, Coreg and Aldactone    Wt Readings from Last 3 Encounters:   07/13/24 84.1 kg (185 lb 6.5 oz)

## 2024-07-16 NOTE — PLAN OF CARE
Problem: Potential for Falls  Goal: Patient will remain free of falls  Description: INTERVENTIONS:  - Educate patient/family on patient safety including physical limitations  - Instruct patient to call for assistance with activity   - Consult OT/PT to assist with strengthening/mobility   - Keep Call bell within reach  - Keep bed low and locked with side rails adjusted as appropriate  - Keep care items and personal belongings within reach  - Initiate and maintain comfort rounds  - Make Fall Risk Sign visible to staff  - Offer Toileting every 2 Hours, in advance of need    - Apply yellow socks and bracelet for high fall risk patients  - Consider moving patient to room near nurses station  Outcome: Progressing     Problem: PAIN - ADULT  Goal: Verbalizes/displays adequate comfort level or baseline comfort level  Description: Interventions:  - Encourage patient to monitor pain and request assistance  - Assess pain using appropriate pain scale  - Administer analgesics based on type and severity of pain and evaluate response  - Implement non-pharmacological measures as appropriate and evaluate response  - Consider cultural and social influences on pain and pain management  - Notify physician/advanced practitioner if interventions unsuccessful or patient reports new pain  Outcome: Progressing

## 2024-07-16 NOTE — ASSESSMENT & PLAN NOTE
History of gross hematuria with an extensive outpatient workup that was negative.  CT urogram and cystoscopy on outpatient revealed no abnormalities, follows with [facility]  Initially declining workup here given insurance, however since remedied and agreeable to ongoing management  Appreciate ongoing urology recs  CT renal protocol ordered - Persistent distention of the left renal pelvis and proximal ureter by high density material, which is suspected to result in a degree of obstruction given delayed nephrogram and absence of contrast excretion. While this is likely at least in part due to blood products, cannot exclude the presence of an upper tract urothelial neoplasm.  Trend H&H. Transfuse as indicated.   Has been stable in normal range  Lab Results   Component Value Date    HGB 12.3 07/16/2024    HGB 12.2 07/15/2024    HGB 12.6 07/14/2024     Continue Proscar, Flomax  CBI held with concern for source from upper tract   Urology following   Remove brewer and void trial   OP follow up with urology

## 2024-07-16 NOTE — ASSESSMENT & PLAN NOTE
Withhold aspirin for now due to hematuria  He does have a drug-eluting stent to the proximal segment of the OM 3 placed 2020  Recommend discussion with his outpatient cardiologist on if he should continue aspirin with risk of bleeding

## 2024-07-16 NOTE — DISCHARGE INSTR - AVS FIRST PAGE
Stop Aspirin   Follow up with your outpatient urology team   Call your cardiologist for guidance of restarting aspirin \      Go to the emergency room if you develop inability to void, left flank pain or significant rebleeding     Take 5 days of Ceftin to prevent UTI     Can take Pyridium 200 mg 3 times daily as needed for burning urinary/bladder spasms. If the spasms persist tomorrow call the urology office and will consider tolterodine 2 mg twice daily as needed

## 2024-07-16 NOTE — UTILIZATION REVIEW
Continued Stay Review    SEE INITIAL REVIEW AT BOTTOM      Date: 7/16/24   Day 2 - INPATIENT   Discharge Summary    Hospital Course:   Jw Ray is a 59 y.o. male patient with history of recurrent hematuria, heart failure, diabetes, hypertension who originally presented to the hospital on 7/13/2024 due to hematuria x 6 days.  Then developed abdominal pain/left flank pain and difficulty urinating so presented to the ED. Green catheter was in place and manual irrigation was started.  Urology was consulted.  Outpatient workup thus far has been negative CT renal protocol done which shows possible.  Upper urinary tract bleeding.  Urology recommending voiding trial and if patient urinating can be discharged.  Does still have some hematuria however will take several days to resolve and hemoglobin has remained stable.  His aspirin will be held on discharge she will follow-up with cardiologist to advise on that.        Disposition:   Home        Vital Signs (last 3 days)       Date/Time Temp Pulse Resp BP MAP (mmHg) SpO2 O2 Device Patient Position - Orthostatic VS [place] Coma Scale Score Pain    07/16/24 0830 -- -- -- -- -- 93 % None (Room air) -- 15 No Pain    07/16/24 07:54:38 97.7 °F (36.5 °C) 80 18 112/69 83 94 % -- -- -- --    07/16/24 0639 -- -- -- -- -- -- -- -- -- Med Not Given for Pain - for MAR use only    07/15/24 2015 -- -- -- -- -- -- None (Room air) -- 15 No Pain    07/15/24 16:45:17 -- 83 -- 118/57 77 96 % -- -- -- --    07/15/24 15:39:30 97.5 °F (36.4 °C) 83 16 106/69 81 95 % -- -- -- --    07/15/24 1113 -- -- -- -- -- -- -- -- -- 5    07/15/24 07:40:48 97.7 °F (36.5 °C) 82 17 121/73 89 94 % None (Room air) -- 15 No Pain    07/15/24 0315 -- -- -- -- -- -- -- -- -- 5    07/14/24 22:53:32 97.5 °F (36.4 °C) 81 -- 118/68 67 95 % -- -- -- --    07/14/24 2043 -- -- -- -- -- -- -- -- -- 5 07/14/24 1940 -- -- -- -- -- -- -- -- 15 --    07/14/24 1930 -- -- -- -- -- -- -- -- -- 2    07/14/24 15:08:27  98.2 °F (36.8 °C) 81 16 95/63 74 95 % -- -- -- --    07/14/24 1356 -- -- -- -- -- -- -- -- -- 2    07/14/24 0812 -- -- -- -- -- 95 % None (Room air) -- 15 No Pain    07/14/24 0700 97.8 °F (36.6 °C) 98 18 119/79 92 -- -- -- -- --    07/14/24 0625 -- -- -- -- -- -- -- -- -- 4 07/13/24 2100 98.8 °F (37.1 °C) -- -- 106/64 78 -- -- Lying -- --    07/13/24 2039 -- -- -- -- -- -- -- -- -- 6 07/13/24 2003 -- -- -- -- -- -- None (Room air) -- 15 --    07/13/24 1500 97.5 °F (36.4 °C) -- 18 -- -- -- -- -- -- --    07/13/24 1222 -- -- -- -- -- -- -- -- -- 6 07/13/24 1012 -- -- -- -- -- -- -- -- 15 No Pain    07/13/24 0700 97.9 °F (36.6 °C) 81 -- 115/77 90 92 % -- -- -- --    07/13/24 0540 97.7 °F (36.5 °C) 83 15 130/92 105 97 % None (Room air) Lying 15 --    07/13/24 0532 -- -- -- -- -- -- -- -- -- 6 07/13/24 0445 -- 82 16 118/77 94 94 % None (Room air) Lying -- --    07/13/24 0358 -- -- -- -- -- -- -- -- -- 6 07/13/24 0345 -- 81 18 125/79 98 94 % None (Room air) Lying -- --    07/13/24 0334 -- 81 16 125/79 -- 97 % None (Room air) Lying -- --    07/13/24 0218 -- -- -- -- -- -- -- -- -- 8    07/13/24 0134 -- -- -- -- -- -- -- -- 15 --    07/13/24 0046 97.3 °F (36.3 °C) 112 20 162/107 -- 97 % None (Room air) Sitting -- 10 - Worst Possible Pain          Weight (last 2 days)       None              Pertinent Labs/Diagnostic Results:   Radiology:  CT renal protocol   Final Interpretation by Alireza Richard MD (07/15 1516)         Persistent distention of the left renal pelvis and proximal ureter by high density material, which is suspected to result in a degree of obstruction given delayed nephrogram and absence of contrast excretion. While this is likely at least in part due to    blood products, cannot exclude the presence of an upper tract urothelial neoplasm.         The study was marked in EPIC for significant notification.                  Resident: GREGG Merlos I, the attending radiologist, have reviewed the  images and agree with the final report above.      Workstation performed: [id number]         CT abdomen pelvis wo contrast   Final Interpretation by Pedro Luis Rider MD (07/13 1050)      High density contents of the left renal collecting system and proximal left ureter indicate hematuria.      Numerous predominately subcentimeter bilateral renal cortical lesions likely to represent hemorrhagic cysts. These were described on prior outside CTs.      Prostamegaly protruding into the bladder base with prominent bladder distention. No bladder wall thickening.      Findings are consistent with the preliminary report from [facility] which was provided shortly after completion of the exam.         Workstation performed: [id number]           Cardiology:  No orders to display     GI:  No orders to display           Results from last 7 days   Lab Units 07/16/24  0501 07/15/24  1354 07/14/24  0430 07/13/24  0629 07/13/24  0111   WBC Thousand/uL 6.33 7.35 11.14* 10.44* 9.37   HEMOGLOBIN g/dL 12.3 12.2 12.6 12.5 14.2   HEMATOCRIT % 36.6 36.5 37.9 36.9 42.4   PLATELETS Thousands/uL 166 172 151 159 209   TOTAL NEUT ABS Thousands/µL 3.42 4.90 8.22*  --  4.93         Results from last 7 days   Lab Units 07/16/24  0501 07/15/24  1354 07/14/24  0430 07/13/24  0629 07/13/24  0111   SODIUM mmol/L 136 133* 135 139 137   POTASSIUM mmol/L 4.0 4.2 4.0 3.9 4.0   CHLORIDE mmol/L 104 103 106 110* 105   CO2 mmol/L 25 23 22 23 23   ANION GAP mmol/L 7 7 7 6 9   BUN mg/dL 20 19 15 18 19   CREATININE mg/dL 1.02 1.03 1.07 0.95 0.94   EGFR ml/min/1.73sq m 80 79 75 87 88   CALCIUM mg/dL 8.8 8.7 8.6 8.6 9.2     Results from last 7 days   Lab Units 07/13/24  0111   AST U/L 13   ALT U/L 12   ALK PHOS U/L 59   TOTAL PROTEIN g/dL 7.4   ALBUMIN g/dL 4.5   TOTAL BILIRUBIN mg/dL 0.39     Results from last 7 days   Lab Units 07/16/24  1145 07/16/24  0752 07/15/24  2053 07/15/24  1630 07/15/24  1115 07/15/24  0741 07/14/24  2100  "07/14/24  1557 07/14/24  1113 07/14/24  0737 07/13/24  1620 07/13/24  1147   POC GLUCOSE mg/dl 203* 191* 188* 173* 185* 164* 201* 183* 207* 174* 171* 162*     Results from last 7 days   Lab Units 07/16/24  0501 07/15/24  1354 07/14/24  0430 07/13/24  0629 07/13/24  0111   GLUCOSE RANDOM mg/dL 152* 195* 166* 160* 243*             No results found for: \"BETA-HYDROXYBUTYRATE\"                           Results from last 7 days   Lab Units 07/13/24  0111   PROTIME seconds 12.6   INR  0.91   PTT seconds 27             Results from last 7 days   Lab Units 07/13/24  0111   LACTIC ACID mmol/L 2.0                                 Results from last 7 days   Lab Units 07/13/24  0111   LIPASE u/L 44                 Results from last 7 days   Lab Units 07/13/24  0108   CLARITY UA  Cloudy   COLOR UA  Red   SPEC GRAV UA  1.015   PH UA  7.0   GLUCOSE UA mg/dl >=1000 (1%)*   KETONES UA mg/dl 15 (1+)*   BLOOD UA  Large*   PROTEIN UA mg/dl >=300*   NITRITE UA  Positive*   BILIRUBIN UA  Negative   UROBILINOGEN UA E.U./dl >=8.0*   LEUKOCYTES UA  Moderate*   WBC UA /hpf Field obscured, unable to enumerate*   RBC UA /hpf Innumerable*   BACTERIA UA /hpf Field obscured, unable to enumerate*   EPITHELIAL CELLS WET PREP /hpf Field obscured, unable to enumerate*                                 Results from last 7 days   Lab Units 07/13/24  0108   URINE CULTURE  No Growth <1000 cfu/mL                   Medications:   Scheduled Medications:  atorvastatin, 40 mg, Oral, After Dinner  carvedilol, 25 mg, Oral, BID With Meals  cefuroxime, 500 mg, Oral, Q12H DEMARCO  docusate sodium, 100 mg, Oral, BID  finasteride, 5 mg, Oral, Daily  insulin lispro, 1-6 Units, Subcutaneous, TID AC  nicotine, 1 patch, Transdermal, Daily  sacubitril-valsartan, 1 tablet, Oral, BID  senna, 1 tablet, Oral, HS  spironolactone, 25 mg, Oral, Daily      Continuous IV Infusions: None       PRN Meds:  acetaminophen, 650 mg, Oral, Q6H PRN  methocarbamol, 500 mg, Oral, Q6H PRN  morphine " injection, 4 mg, Intravenous, Q4H PRN  oxyCODONE, 5 mg, Oral, Q6H PRN  polyethylene glycol, 17 g, Oral, Daily PRN          Network Utilization Review Department  ATTENTION: Please call with any questions or concerns to [phone] and carefully listen to the prompts so that you are directed to the right person. All voicemails are confidential.   For Discharge needs, contact Care Management DC Support Team at [phone] opt. [phone]  Send all requests for admission clinical reviews, approved or denied determinations and any other requests to dedicated fax number below belonging to the [facility] where the patient is receiving treatment. List of dedicated fax numbers for the Facilities:  FACILITY NAME UR FAX NUMBER   ADMISSION DENIALS (Administrative/Medical Necessity) [phone]   DISCHARGE SUPPORT TEAM (NETWORK) [phone]   PARENT CHILD HEALTH (Maternity/NICU/Pediatrics) [phone]   [facility] [phone]   [facility] [phone]   [facility] [phone]   [facility] [phone]   Critical access hospital [phone]   [facility] [phone]   General acute hospital [phone]   [facility] [phone]   [facility] [phone]   [facility] [phone]   [facility] [phone]   [facility] [phone]

## 2024-07-16 NOTE — CASE MANAGEMENT
Case Management Assessment & Discharge Planning Note    Patient name Jw Ray  Location /-01 MRN [id number]  : 1965 Date 2024       Current Admission Date: 2024  Current Admission Diagnosis:Gross hematuria   Patient Active Problem List    Diagnosis Date Noted Date Diagnosed    Benign prostatic hyperplasia with urinary retention 2024     Acute left flank pain 2023     Gross hematuria 2023     Pure hypercholesterolemia 2023     Chronic combined systolic and diastolic heart failure (HCC) 2022     Vitamin D deficiency 2020     Presence of stent in coronary artery 2020     Ischemic cardiomyopathy 10/29/2018     Coronary artery disease due to lipid rich plaque 2013     Essential hypertension 2013     Type 2 diabetes mellitus (HCC) 2011     Long term current use of anticoagulant therapy 12/15/2010       LOS (days): 1  Geometric Mean LOS (GMLOS) (days):   Days to GMLOS:     OBJECTIVE:    Risk of Unplanned Readmission Score: 7.66         Current admission status: Inpatient       Preferred Pharmacy:   [facility] [address]  [facility] [address]  Phone: [phone] Fax: [phone]    [facility] [address] [place] [address] [address] [facility]  [address] [facility] [address]  Phone: [phone] Fax: [phone]    Primary Care Provider: No primary care provider on file.    Primary Insurance: COMMERCIAL MISCELLANEOUS  Secondary Insurance:     ASSESSMENT:  Active Health Care Proxies    There are no active Health Care Proxies on file.       Advance Directives  Does patient have a Health Care POA?: No  Was patient offered paperwork?: Yes (patient declined at this time, plans on getting this completed in the next couple weeks)  Does patient currently have a Health Care decision maker?: Yes, please see Health Care Proxy section (Priscilla  Flor- spouse)  Does patient have Advance Directives?: No  Was patient offered paperwork?: Yes (patient declined at this time)  Primary Contact: Priscilla Ray- spouse         Readmission Root Cause  30 Day Readmission: No    Patient Information  Admitted from:: Home  Mental Status: Alert  During Assessment patient was accompanied by: Not accompanied during assessment  Assessment information provided by:: Patient  Primary Caregiver: Self  Support Systems: Spouse/significant other, Self  [facility]  What city do you live in?: [place]  Home entry access options. Select all that apply.: Stairs  Number of steps to enter home.: 1  Do the steps have railings?: No  Type of Current Residence: [facility]  Living Arrangements: Lives w/ Spouse/significant other  Is patient a ?: No    Activities of Daily Living Prior to Admission  Functional Status: Independent  Completes ADLs independently?: Yes  Ambulates independently?: Yes  Does patient use assisted devices?: No  Does patient currently own DME?: No  Does patient have a history of Outpatient Therapy (PT/OT)?: No  Does the patient have a history of Short-Term Rehab?: No  Does patient have a history of HHC?: No  Does patient currently have HHC?: No         Patient Information Continued  Income Source: Employed  Does patient have prescription coverage?: Yes  Does patient receive dialysis treatments?: No  Does patient have a history of substance abuse?: No  Does patient have a history of Mental Health Diagnosis?: No         Means of Transportation  Means of Transport to Appts:: Drives Self      Social Determinants of Health (SDOH)      Flowsheet Row Most Recent Value   Housing Stability    In the last 12 months, was there a time when you were not able to pay the mortgage or rent on time? N   In the past 12 months, how many times have you moved where you were living? 0   At any time in the past 12 months, were you homeless or living in a shelter  (including now)? N   Transportation Needs    In the past 12 months, has lack of transportation kept you from medical appointments or from getting medications? no   In the past 12 months, has lack of transportation kept you from meetings, work, or from getting things needed for daily living? No   Food Insecurity    Within the past 12 months, you worried that your food would run out before you got the money to buy more. Never true   Within the past 12 months, the food you bought just didn't last and you didn't have money to get more. Never true   Utilities    In the past 12 months has the electric, gas, oil, or water company threatened to shut off services in your home? No            DISCHARGE DETAILS:    Discharge planning discussed with:: Patient        CM contacted family/caregiver?: Yes (Priscilla Ray - spouse)             Contacts  Patient Contacts: Priscilla Ray  Relationship to Patient:: Family  Contact Method: Phone  Phone Number: [phone]  Reason/Outcome: Continuity of Care         CM met with pt to review role of CM and discuss any supports needed upon discharge. Baseline information obtained from patient, he is independent with all ADLs, lives with his spouse, employed full time, and driving independently. Indicates no CM needs at this time. CM will continue to follow and address any d/c planning needs.                    Treatment Team Recommendation: Home  Discharge Destination Plan:: Home  Transport at Discharge : Family

## 2024-07-17 NOTE — UTILIZATION REVIEW
NOTIFICATION OF ADMISSION DISCHARGE   This is a Notification of Discharge from [facility]. Please be advised that this patient has been discharge from our facility. Below you will find the admission and discharge date and time including the patient’s disposition.   UTILIZATION REVIEW CONTACT:  Ramandeep Chaney  Utilization   Network Utilization Review Department  Phone: [phone] x carefully listen to the prompts. All voicemails are confidential.  Email: [email or web address]     ADMISSION INFORMATION  PRESENTATION DATE: 7/13/2024 12:42 AM  OBERVATION ADMISSION DATE: 07/13/2024 0420  INPATIENT ADMISSION DATE: 7/15/24 12:22 PM   DISCHARGE DATE: 7/16/2024  4:56 PM   DISPOSITION:Home/Self Care    Network Utilization Review Department  ATTENTION: Please call with any questions or concerns to [phone] and carefully listen to the prompts so that you are directed to the right person. All voicemails are confidential.   For Discharge needs, contact Care Management DC Support Team at [phone] opt. [phone]  Send all requests for admission clinical reviews, approved or denied determinations and any other requests to dedicated fax number below belonging to the campus where the patient is receiving treatment. List of dedicated fax numbers for the Facilities:  FACILITY NAME UR FAX NUMBER   ADMISSION DENIALS (Administrative/Medical Necessity) [phone]   DISCHARGE SUPPORT TEAM ([facility]) [phone]   PARENT CHILD HEALTH (Maternity/NICU/Pediatrics) [phone]   [facility] [phone]   [facility] [phone]   [facility] [phone]   [facility] [phone]   [facility] [phone]   [facility] [phone]   [facility] [phone]   [facility]  [facility] [phone]   [facility] [phone]   [facility] [phone]   [facility] [phone]   [facility] [phone]

## 2025-05-09 ENCOUNTER — HOSPITAL ENCOUNTER (INPATIENT)
Facility: HOSPITAL | Age: 60
LOS: 1 days | DRG: 696 | End: 2025-05-10
Attending: EMERGENCY MEDICINE | Admitting: FAMILY MEDICINE
Payer: COMMERCIAL

## 2025-05-09 ENCOUNTER — APPOINTMENT (EMERGENCY)
Dept: CT IMAGING | Facility: HOSPITAL | Age: 60
DRG: 696 | End: 2025-05-09
Payer: COMMERCIAL

## 2025-05-09 DIAGNOSIS — R31.9 HEMATURIA: Primary | ICD-10-CM

## 2025-05-09 DIAGNOSIS — N32.89 BLOOD CLOT IN BLADDER: ICD-10-CM

## 2025-05-09 DIAGNOSIS — R33.9 URINARY RETENTION: ICD-10-CM

## 2025-05-09 PROBLEM — N13.30 HYDRONEPHROSIS OF LEFT KIDNEY: Status: ACTIVE | Noted: 2025-05-09

## 2025-05-09 LAB
ALBUMIN SERPL BCG-MCNC: 4.6 G/DL (ref 3.5–5)
ALP SERPL-CCNC: 41 U/L (ref 34–104)
ALT SERPL W P-5'-P-CCNC: 11 U/L (ref 7–52)
ANION GAP SERPL CALCULATED.3IONS-SCNC: 9 MMOL/L (ref 4–13)
APTT PPP: 26 SECONDS (ref 23–34)
AST SERPL W P-5'-P-CCNC: 12 U/L (ref 13–39)
BACTERIA UR QL AUTO: ABNORMAL /HPF
BASOPHILS # BLD AUTO: 0.07 THOUSANDS/ÂΜL (ref 0–0.1)
BASOPHILS NFR BLD AUTO: 1 % (ref 0–1)
BILIRUB SERPL-MCNC: 0.6 MG/DL (ref 0.2–1)
BILIRUB UR QL STRIP: ABNORMAL
BUN SERPL-MCNC: 17 MG/DL (ref 5–25)
CALCIUM SERPL-MCNC: 9.1 MG/DL (ref 8.4–10.2)
CHLORIDE SERPL-SCNC: 104 MMOL/L (ref 96–108)
CLARITY UR: ABNORMAL
CO2 SERPL-SCNC: 23 MMOL/L (ref 21–32)
COLOR UR: ABNORMAL
CREAT SERPL-MCNC: 0.94 MG/DL (ref 0.6–1.3)
EOSINOPHIL # BLD AUTO: 0.16 THOUSAND/ÂΜL (ref 0–0.61)
EOSINOPHIL NFR BLD AUTO: 2 % (ref 0–6)
ERYTHROCYTE [DISTWIDTH] IN BLOOD BY AUTOMATED COUNT: 12.5 % (ref 11.6–15.1)
GFR SERPL CREATININE-BSD FRML MDRD: 88 ML/MIN/1.73SQ M
GLUCOSE SERPL-MCNC: 140 MG/DL (ref 65–140)
GLUCOSE SERPL-MCNC: 171 MG/DL (ref 65–140)
GLUCOSE SERPL-MCNC: 190 MG/DL (ref 65–140)
GLUCOSE UR STRIP-MCNC: ABNORMAL MG/DL
HCT VFR BLD AUTO: 35.1 % (ref 36.5–49.3)
HCT VFR BLD AUTO: 40.5 % (ref 36.5–49.3)
HGB BLD-MCNC: 11.6 G/DL (ref 12–17)
HGB BLD-MCNC: 13.3 G/DL (ref 12–17)
HGB UR QL STRIP.AUTO: ABNORMAL
IMM GRANULOCYTES # BLD AUTO: 0.04 THOUSAND/UL (ref 0–0.2)
IMM GRANULOCYTES NFR BLD AUTO: 0 % (ref 0–2)
INR PPP: 0.99 (ref 0.85–1.19)
KETONES UR STRIP-MCNC: NEGATIVE MG/DL
LEUKOCYTE ESTERASE UR QL STRIP: ABNORMAL
LYMPHOCYTES # BLD AUTO: 2.31 THOUSANDS/ÂΜL (ref 0.6–4.47)
LYMPHOCYTES NFR BLD AUTO: 23 % (ref 14–44)
MCH RBC QN AUTO: 29.4 PG (ref 26.8–34.3)
MCHC RBC AUTO-ENTMCNC: 32.8 G/DL (ref 31.4–37.4)
MCV RBC AUTO: 90 FL (ref 82–98)
MONOCYTES # BLD AUTO: 0.59 THOUSAND/ÂΜL (ref 0.17–1.22)
MONOCYTES NFR BLD AUTO: 6 % (ref 4–12)
NEUTROPHILS # BLD AUTO: 7.03 THOUSANDS/ÂΜL (ref 1.85–7.62)
NEUTS SEG NFR BLD AUTO: 68 % (ref 43–75)
NITRITE UR QL STRIP: NEGATIVE
NON-SQ EPI CELLS URNS QL MICRO: ABNORMAL /HPF
NRBC BLD AUTO-RTO: 0 /100 WBCS
PH UR STRIP.AUTO: 7.5 [PH]
PLATELET # BLD AUTO: 201 THOUSANDS/UL (ref 149–390)
PMV BLD AUTO: 10.5 FL (ref 8.9–12.7)
POTASSIUM SERPL-SCNC: 4.5 MMOL/L (ref 3.5–5.3)
PROT SERPL-MCNC: 7 G/DL (ref 6.4–8.4)
PROT UR STRIP-MCNC: >=300 MG/DL
PROTHROMBIN TIME: 13.5 SECONDS (ref 12.3–15)
RBC # BLD AUTO: 4.52 MILLION/UL (ref 3.88–5.62)
RBC #/AREA URNS AUTO: ABNORMAL /HPF
SODIUM SERPL-SCNC: 136 MMOL/L (ref 135–147)
SP GR UR STRIP.AUTO: 1.02 (ref 1–1.03)
UROBILINOGEN UR QL STRIP.AUTO: 0.2 E.U./DL
WBC # BLD AUTO: 10.2 THOUSAND/UL (ref 4.31–10.16)
WBC #/AREA URNS AUTO: ABNORMAL /HPF

## 2025-05-09 PROCEDURE — 96366 THER/PROPH/DIAG IV INF ADDON: CPT

## 2025-05-09 PROCEDURE — 81001 URINALYSIS AUTO W/SCOPE: CPT | Performed by: EMERGENCY MEDICINE

## 2025-05-09 PROCEDURE — 99284 EMERGENCY DEPT VISIT MOD MDM: CPT

## 2025-05-09 PROCEDURE — 85610 PROTHROMBIN TIME: CPT | Performed by: EMERGENCY MEDICINE

## 2025-05-09 PROCEDURE — 51798 US URINE CAPACITY MEASURE: CPT

## 2025-05-09 PROCEDURE — 85018 HEMOGLOBIN: CPT | Performed by: FAMILY MEDICINE

## 2025-05-09 PROCEDURE — 82948 REAGENT STRIP/BLOOD GLUCOSE: CPT

## 2025-05-09 PROCEDURE — 85730 THROMBOPLASTIN TIME PARTIAL: CPT | Performed by: EMERGENCY MEDICINE

## 2025-05-09 PROCEDURE — 99285 EMERGENCY DEPT VISIT HI MDM: CPT | Performed by: EMERGENCY MEDICINE

## 2025-05-09 PROCEDURE — 85014 HEMATOCRIT: CPT | Performed by: FAMILY MEDICINE

## 2025-05-09 PROCEDURE — 87086 URINE CULTURE/COLONY COUNT: CPT | Performed by: EMERGENCY MEDICINE

## 2025-05-09 PROCEDURE — 74176 CT ABD & PELVIS W/O CONTRAST: CPT

## 2025-05-09 PROCEDURE — 96365 THER/PROPH/DIAG IV INF INIT: CPT

## 2025-05-09 PROCEDURE — 96375 TX/PRO/DX INJ NEW DRUG ADDON: CPT

## 2025-05-09 PROCEDURE — 99223 1ST HOSP IP/OBS HIGH 75: CPT | Performed by: FAMILY MEDICINE

## 2025-05-09 PROCEDURE — 80053 COMPREHEN METABOLIC PANEL: CPT | Performed by: EMERGENCY MEDICINE

## 2025-05-09 PROCEDURE — 0T9B70Z DRAINAGE OF BLADDER WITH DRAINAGE DEVICE, VIA NATURAL OR ARTIFICIAL OPENING: ICD-10-PCS | Performed by: EMERGENCY MEDICINE

## 2025-05-09 PROCEDURE — 36415 COLL VENOUS BLD VENIPUNCTURE: CPT | Performed by: EMERGENCY MEDICINE

## 2025-05-09 PROCEDURE — 85025 COMPLETE CBC W/AUTO DIFF WBC: CPT | Performed by: EMERGENCY MEDICINE

## 2025-05-09 RX ORDER — MULTIVITAMIN WITH IRON
500 TABLET ORAL DAILY
COMMUNITY

## 2025-05-09 RX ORDER — OXYBUTYNIN CHLORIDE 5 MG/1
5 TABLET ORAL 3 TIMES DAILY
Status: DISCONTINUED | OUTPATIENT
Start: 2025-05-09 | End: 2025-05-10 | Stop reason: HOSPADM

## 2025-05-09 RX ORDER — ACETAMINOPHEN 325 MG/1
650 TABLET ORAL EVERY 6 HOURS PRN
Status: DISCONTINUED | OUTPATIENT
Start: 2025-05-09 | End: 2025-05-10 | Stop reason: HOSPADM

## 2025-05-09 RX ORDER — FINASTERIDE 5 MG/1
5 TABLET, FILM COATED ORAL DAILY
Status: DISCONTINUED | OUTPATIENT
Start: 2025-05-09 | End: 2025-05-10 | Stop reason: HOSPADM

## 2025-05-09 RX ORDER — FENTANYL CITRATE 50 UG/ML
50 INJECTION, SOLUTION INTRAMUSCULAR; INTRAVENOUS ONCE
Refills: 0 | Status: COMPLETED | OUTPATIENT
Start: 2025-05-09 | End: 2025-05-09

## 2025-05-09 RX ORDER — INSULIN LISPRO 100 [IU]/ML
1-6 INJECTION, SOLUTION INTRAVENOUS; SUBCUTANEOUS
Status: DISCONTINUED | OUTPATIENT
Start: 2025-05-09 | End: 2025-05-10 | Stop reason: HOSPADM

## 2025-05-09 RX ORDER — ATROPA BELLADONNA AND OPIUM 16.2; 3 MG/1; MG/1
30 SUPPOSITORY RECTAL ONCE
Refills: 0 | Status: DISCONTINUED | OUTPATIENT
Start: 2025-05-09 | End: 2025-05-10 | Stop reason: HOSPADM

## 2025-05-09 RX ORDER — ATORVASTATIN CALCIUM 40 MG/1
40 TABLET, FILM COATED ORAL DAILY
Status: DISCONTINUED | OUTPATIENT
Start: 2025-05-09 | End: 2025-05-10 | Stop reason: HOSPADM

## 2025-05-09 RX ORDER — CARVEDILOL 12.5 MG/1
25 TABLET ORAL 2 TIMES DAILY WITH MEALS
Status: DISCONTINUED | OUTPATIENT
Start: 2025-05-09 | End: 2025-05-10 | Stop reason: HOSPADM

## 2025-05-09 RX ORDER — NICOTINE 21 MG/24HR
1 PATCH, TRANSDERMAL 24 HOURS TRANSDERMAL DAILY
Status: DISCONTINUED | OUTPATIENT
Start: 2025-05-09 | End: 2025-05-10 | Stop reason: HOSPADM

## 2025-05-09 RX ORDER — LIDOCAINE HYDROCHLORIDE 20 MG/ML
1 JELLY TOPICAL ONCE
Status: COMPLETED | OUTPATIENT
Start: 2025-05-09 | End: 2025-05-09

## 2025-05-09 RX ORDER — GABAPENTIN 100 MG/1
100 CAPSULE ORAL DAILY
Status: DISCONTINUED | OUTPATIENT
Start: 2025-05-09 | End: 2025-05-10 | Stop reason: HOSPADM

## 2025-05-09 RX ORDER — SPIRONOLACTONE 25 MG/1
25 TABLET ORAL DAILY
Status: DISCONTINUED | OUTPATIENT
Start: 2025-05-09 | End: 2025-05-09

## 2025-05-09 RX ORDER — VITAMIN B COMPLEX
TABLET ORAL
COMMUNITY

## 2025-05-09 RX ORDER — ONDANSETRON 2 MG/ML
4 INJECTION INTRAMUSCULAR; INTRAVENOUS EVERY 6 HOURS PRN
Status: DISCONTINUED | OUTPATIENT
Start: 2025-05-09 | End: 2025-05-10 | Stop reason: HOSPADM

## 2025-05-09 RX ORDER — OMEGA-3 FATTY ACIDS/FISH OIL 300-1000MG
CAPSULE ORAL
COMMUNITY

## 2025-05-09 RX ORDER — OXYCODONE HYDROCHLORIDE 5 MG/1
5 TABLET ORAL EVERY 4 HOURS PRN
Refills: 0 | Status: DISCONTINUED | OUTPATIENT
Start: 2025-05-09 | End: 2025-05-10 | Stop reason: HOSPADM

## 2025-05-09 RX ADMIN — OXYBUTYNIN CHLORIDE 5 MG: 5 TABLET ORAL at 20:17

## 2025-05-09 RX ADMIN — SODIUM CHLORIDE, SODIUM LACTATE, POTASSIUM CHLORIDE, AND CALCIUM CHLORIDE 1000 ML: .6; .31; .03; .02 INJECTION, SOLUTION INTRAVENOUS at 11:36

## 2025-05-09 RX ADMIN — FENTANYL CITRATE 50 MCG: 0.05 INJECTION, SOLUTION INTRAMUSCULAR; INTRAVENOUS at 13:33

## 2025-05-09 RX ADMIN — OXYCODONE HYDROCHLORIDE 5 MG: 5 TABLET ORAL at 18:16

## 2025-05-09 RX ADMIN — INSULIN LISPRO 1 UNITS: 100 INJECTION, SOLUTION INTRAVENOUS; SUBCUTANEOUS at 21:06

## 2025-05-09 RX ADMIN — SACUBITRIL AND VALSARTAN 1 TABLET: 49; 51 TABLET, FILM COATED ORAL at 17:24

## 2025-05-09 RX ADMIN — LIDOCAINE HYDROCHLORIDE 1 APPLICATION: 20 JELLY TOPICAL at 11:37

## 2025-05-09 RX ADMIN — NICOTINE 1 PATCH: 14 PATCH, EXTENDED RELEASE TRANSDERMAL at 16:06

## 2025-05-09 RX ADMIN — OXYBUTYNIN CHLORIDE 5 MG: 5 TABLET ORAL at 16:13

## 2025-05-09 NOTE — ED PROVIDER NOTES
Time reflects when diagnosis was documented in both MDM as applicable and the Disposition within this note       Time User Action Codes Description Comment    5/9/2025  1:15 PM Alireza Alexander Add [R31.9] Hematuria     5/9/2025  1:15 PM Alireza Alexander Add [R33.9] Urinary retention     5/9/2025  1:15 PM Alireza Alexander Add [N32.89] Blood clot in bladder           ED Disposition       ED Disposition   Admit    Condition   Stable    Date/Time   Fri May 9, 2025  1:15 PM    Comment                  Assessment & Plan       Medical Decision Making  1057: Patient appears well, vital signs reviewed.  Benign abdominal exam.  Patient is having abby hematuria with decreased urine output.  Patient feels that he may be obstructed.  Check bladder scan/postvoid residual.  Patient was able to urinate 5 cc of dark red blood, post residual 450 mL.  Plan to complete basic labs including urinalysis.  Plan to complete CT abdomen pelvis.  I will place three-way Green in anticipation for need for hand irrigation and CBI.    1317: CT and labs reviewed.  Discussed with urology for assistance with care.  They are recommending hand irrigation every 4 hours, continue CBI, will follow patient along.  I will admit to hospitalist.    Amount and/or Complexity of Data Reviewed  Labs: ordered.  Radiology: ordered.     Details: CT abdomen pelvis--hyperdensity in bladder concerning for blood clot versus neoplasm  Discussion of management or test interpretation with external provider(s): NIKOLE Foreman urology    Risk  Prescription drug management.  Decision regarding hospitalization.             Medications   belladonna-opium (B&O SUPPOSITORY) 16.2-30 mg suppository 1 suppository (1 suppository Rectal Not Given 5/9/25 1325)   lactated ringers bolus 1,000 mL (0 mL Intravenous Stopped 5/9/25 1307)   lidocaine (URO-JET) 2 % urethral/mucosal gel 1 Application (1 Application Urethral Given 5/9/25 1137)   fentaNYL injection 50 mcg (50 mcg Intravenous  Given 5/9/25 0283)       ED Risk Strat Scores                    No data recorded                            History of Present Illness       Chief Complaint   Patient presents with    Blood in Urine     Pt presented to this ED with spouse from home c/o blood in urine with clots states worsened over past couple days. Pt mentioned was here for same last year. Denies pain/travel/sob/fevers/cough/n/v/d       Past Medical History:   Diagnosis Date    CHF (congestive heart failure) (HCC)     DM (diabetes mellitus) (HCC)     HTN (hypertension)       Past Surgical History:   Procedure Laterality Date    CARDIAC SURGERY        History reviewed. No pertinent family history.   Social History     Tobacco Use    Smoking status: Never    Smokeless tobacco: Current     Types: Chew   Substance Use Topics    Alcohol use: Not Currently    Drug use: Never      E-Cigarette/Vaping      E-Cigarette/Vaping Substances      I have reviewed and agree with the history as documented.       History provided by:  Medical records, patient and spouse  Blood in Urine  This is a recurrent problem. The current episode started today. The problem is unchanged. He describes the hematuria as gross hematuria. The hematuria occurs during the initial portion of his urinary stream. He reports clotting at the middle, beginning and end of his urine stream. His pain is at a severity of 2/10. The pain is mild. He describes his urine color as dark red. Irritative symptoms do not include frequency or urgency. Associated symptoms include dysuria. Pertinent negatives include no abdominal pain, chills, fever, flank pain, nausea or vomiting. His sexual activity is non-contributory to the current illness.       Review of Systems   Constitutional:  Negative for appetite change, chills, fatigue and fever.   HENT:  Negative for ear pain, rhinorrhea, sore throat and trouble swallowing.    Eyes:  Negative for pain, discharge and visual disturbance.   Respiratory:  Negative  for cough, chest tightness and shortness of breath.    Cardiovascular:  Negative for chest pain and palpitations.   Gastrointestinal:  Negative for abdominal pain, nausea and vomiting.   Endocrine: Negative for polydipsia, polyphagia and polyuria.   Genitourinary:  Positive for difficulty urinating, dysuria, hematuria and penile pain. Negative for decreased urine volume, enuresis, flank pain, frequency, genital sores, penile discharge, penile swelling, scrotal swelling, testicular pain and urgency.   Musculoskeletal:  Negative for arthralgias and back pain.   Skin:  Negative for color change and rash.   Allergic/Immunologic: Negative for immunocompromised state.   Neurological:  Negative for dizziness, seizures, syncope, weakness and headaches.   Hematological:  Negative for adenopathy.   Psychiatric/Behavioral:  Negative for confusion and dysphoric mood.    All other systems reviewed and are negative.          Objective       ED Triage Vitals [05/09/25 1056]   Temperature Pulse Blood Pressure Respirations SpO2 Patient Position - Orthostatic VS   98.3 °F (36.8 °C) 95 158/95 18 98 % Lying      Temp src Heart Rate Source BP Location FiO2 (%) Pain Score    -- Monitor Right arm -- No Pain      Vitals      Date and Time Temp Pulse SpO2 Resp BP Pain Score FACES Pain Rating User   05/09/25 1333 -- -- -- -- -- 8 -- LAK   05/09/25 1056 98.3 °F (36.8 °C) 95 98 % 18 158/95 No Pain -- AC            Physical Exam  Vitals and nursing note reviewed.   Constitutional:       General: He is not in acute distress.     Appearance: Normal appearance. He is not ill-appearing, toxic-appearing or diaphoretic.   HENT:      Head: Normocephalic and atraumatic.      Nose: Nose normal. No congestion or rhinorrhea.      Mouth/Throat:      Mouth: Mucous membranes are moist.      Pharynx: Oropharynx is clear. No oropharyngeal exudate or posterior oropharyngeal erythema.   Eyes:      General:         Right eye: No discharge.         Left eye: No  discharge.   Cardiovascular:      Rate and Rhythm: Normal rate and regular rhythm.      Pulses: Normal pulses.      Heart sounds: Normal heart sounds. No murmur heard.     No gallop.   Pulmonary:      Effort: Pulmonary effort is normal. No respiratory distress.      Breath sounds: Normal breath sounds. No stridor. No wheezing, rhonchi or rales.   Chest:      Chest wall: No tenderness.   Abdominal:      General: Bowel sounds are normal. There is no distension.      Palpations: Abdomen is soft. There is no mass.      Tenderness: There is no abdominal tenderness. There is no right CVA tenderness, left CVA tenderness, guarding or rebound.      Hernia: No hernia is present.   Musculoskeletal:         General: Normal range of motion.      Cervical back: Normal range of motion and neck supple.   Skin:     General: Skin is warm and dry.      Capillary Refill: Capillary refill takes less than 2 seconds.   Neurological:      General: No focal deficit present.      Mental Status: He is alert and oriented to person, place, and time.      Cranial Nerves: No cranial nerve deficit.      Sensory: No sensory deficit.      Motor: No weakness.      Coordination: Coordination normal.      Gait: Gait normal.      Deep Tendon Reflexes: Reflexes normal.   Psychiatric:         Mood and Affect: Mood normal.         Behavior: Behavior normal.         Thought Content: Thought content normal.         Judgment: Judgment normal.         Results Reviewed       Procedure Component Value Units Date/Time    Urine Microscopic [[id number]]  (Abnormal) Collected: 05/09/25 1116    Lab Status: Final result Specimen: Urine, Clean Catch Updated: 05/09/25 1140     RBC, UA       Field obscured, unable to enumerate     /hpf     WBC, UA       Field obscured, unable to enumerate     /hpf     Epithelial Cells       Field obscured, unable to enumerate     /hpf     Bacteria, UA       Field obscured, unable to enumerate     /hpf    Urine culture [[id number]]  Collected: 05/09/25 1116    Lab Status: In process Specimen: Urine, Clean Catch Updated: 05/09/25 1140    Comprehensive metabolic panel [[id number]]  (Abnormal) Collected: 05/09/25 1113    Lab Status: Final result Specimen: Blood from Arm, Left Updated: 05/09/25 1139     Sodium 136 mmol/L      Potassium 4.5 mmol/L      Chloride 104 mmol/L      CO2 23 mmol/L      ANION GAP 9 mmol/L      BUN 17 mg/dL      Creatinine 0.94 mg/dL      Glucose 190 mg/dL      Calcium 9.1 mg/dL      AST 12 U/L      ALT 11 U/L      Alkaline Phosphatase 41 U/L      Total Protein 7.0 g/dL      Albumin 4.6 g/dL      Total Bilirubin 0.60 mg/dL      eGFR 88 ml/min/1.73sq m     Narrative:      National Kidney Disease Foundation guidelines for Chronic Kidney Disease (CKD):     Stage 1 with normal or high GFR (GFR > 90 mL/min/1.73 square meters)    Stage 2 Mild CKD (GFR = 60-89 mL/min/1.73 square meters)    Stage 3A Moderate CKD (GFR = 45-59 mL/min/1.73 square meters)    Stage 3B Moderate CKD (GFR = 30-44 mL/min/1.73 square meters)    Stage 4 Severe CKD (GFR = 15-29 mL/min/1.73 square meters)    Stage 5 End Stage CKD (GFR <15 mL/min/1.73 square meters)  Note: GFR calculation is accurate only with a steady state creatinine    UA w Reflex to Microscopic w Reflex to Culture [[id number]]  (Abnormal) Collected: 05/09/25 1116    Lab Status: Final result Specimen: Urine, Clean Catch Updated: 05/09/25 1139     Color, UA Red     Clarity, UA Turbid     Specific Gravity, UA 1.025     pH, UA 7.5     Leukocytes, UA Elevated glucose may cause decreased leukocyte values. See urine microscopic for UWBC result     Nitrite, UA Negative     Protein, UA >=300 mg/dl      Glucose, UA >=1000 (1%) mg/dl      Ketones, UA Negative mg/dl      Urobilinogen, UA 0.2 E.U./dl      Bilirubin, UA Small     Occult Blood, UA Large    Protime-INR [[id number]]  (Normal) Collected: 05/09/25 1113    Lab Status: Final result Specimen: Blood from Arm, Left Updated: 05/09/25 1132     Protime  13.5 seconds      INR 0.99    Narrative:      INR Therapeutic Range    Indication                                             INR Range      Atrial Fibrillation                                               2.0-3.0  Hypercoagulable State                                    2.0.2.3  Left Ventricular Asist Device                            2.0-3.0  Mechanical Heart Valve                                  -    Aortic(with afib, MI, embolism, HF, LA enlargement,    and/or coagulopathy)                                     2.0-3.0 (2.5-3.5)     Mitral                                                             2.5-3.5  Prosthetic/Bioprosthetic Heart Valve               2.0-3.0  Venous thromboembolism (VTE: VT, PE        2.0-3.0    APTT [[id number]]  (Normal) Collected: 05/09/25 1113    Lab Status: Final result Specimen: Blood from Arm, Left Updated: 05/09/25 1132     PTT 26 seconds     CBC and differential [[id number]]  (Abnormal) Collected: 05/09/25 1113    Lab Status: Final result Specimen: Blood from Arm, Left Updated: 05/09/25 1117     WBC 10.20 Thousand/uL      RBC 4.52 Million/uL      Hemoglobin 13.3 g/dL      Hematocrit 40.5 %      MCV 90 fL      MCH 29.4 pg      MCHC 32.8 g/dL      RDW 12.5 %      MPV 10.5 fL      Platelets 201 Thousands/uL      nRBC 0 /100 WBCs      Segmented % 68 %      Immature Grans % 0 %      Lymphocytes % 23 %      Monocytes % 6 %      Eosinophils Relative 2 %      Basophils Relative 1 %      Absolute Neutrophils 7.03 Thousands/µL      Absolute Immature Grans 0.04 Thousand/uL      Absolute Lymphocytes 2.31 Thousands/µL      Absolute Monocytes 0.59 Thousand/µL      Eosinophils Absolute 0.16 Thousand/µL      Basophils Absolute 0.07 Thousands/µL             CT renal stone study abdomen pelvis wo contrast   Final Interpretation by Abhay Almaguer MD (05/09 1204)      1.  Large dependent lobulated hyperdensity in the urinary bladder, likely blood clot, although neoplasm is not excluded on this  noncontrast study.   2.  Stable appearance of left hydronephrosis containing high density material, which also may represent blood products, although neoplasm again not excluded. Urology consultation is recommended.   3.  Tiny dependent hyperdense foci within the second portion of the duodenum and a left mid abdominal small bowel loop, which may represent ingested material. Correlate with oral intake.         Workstation performed: [id number]             Procedures    ED Medication and Procedure Management   Prior to Admission Medications   Prescriptions Last Dose Informant Patient Reported? Taking?   Empagliflozin 25 MG TABS   Yes No   Sig: Take 25 mg by mouth daily   atorvastatin (LIPITOR) 40 mg tablet   Yes No   Sig: Take 40 mg by mouth daily   carvedilol (COREG) 25 mg tablet   Yes No   Sig: Take 25 mg by mouth 2 (two) times a day with meals   finasteride (PROSCAR) 5 mg tablet   No No   Sig: Take 1 tablet (5 mg total) by mouth daily   gabapentin (NEURONTIN) 100 mg capsule   Yes No   Sig: Take 100 mg by mouth daily   glyBURIDE (DIABETA) 2.5 mg tablet   Yes No   Sig: Take 2.5 mg by mouth daily with breakfast   metFORMIN (GLUCOPHAGE) 1000 MG tablet   Yes No   Sig: Take 1 tablet by mouth 2 (two) times a day with meals   nicotine (NICODERM CQ) 14 mg/24hr TD 24 hr patch   No No   Sig: Place 1 patch on the skin over 24 hours daily   phenazopyridine (PYRIDIUM) 100 mg tablet   No No   Sig: Take 1 tablet (100 mg total) by mouth 3 (three) times a day as needed for bladder spasms   sacubitril-valsartan (Entresto) 49-51 MG TABS   Yes No   Sig: Take 1 tablet by mouth 2 (two) times a day   semaglutide, 1 mg/dose, (Ozempic, 1 MG/DOSE,) 2 mg/1.5 mL injection pen   Yes No   Sig: Inject 1 mg under the skin Once a week   sildenafil (VIAGRA) 25 MG tablet   Yes No   Sig: Take 25 mg by mouth daily as needed   spironolactone (ALDACTONE) 25 mg tablet   Yes No   Sig: Take 25 mg by mouth daily      Facility-Administered Medications: None      Patient's Medications   Discharge Prescriptions    No medications on file     No discharge procedures on file.  ED SEPSIS DOCUMENTATION   Time reflects when diagnosis was documented in both MDM as applicable and the Disposition within this note       Time User Action Codes Description Comment    5/9/2025  1:15 PM Alireza Alexander Add [R31.9] Hematuria     5/9/2025  1:15 PM Alireza Alexander Add [R33.9] Urinary retention     5/9/2025  1:15 PM Alireza Alexander Add [N32.89] Blood clot in bladder                  Alireza Alexander MD  05/09/25 8461

## 2025-05-09 NOTE — ASSESSMENT & PLAN NOTE
"Lab Results   Component Value Date    HGBA1C 8 (H) 04/28/2025       No results for input(s): \"POCGLU\" in the last 72 hours.    Blood Sugar Average: Last 72 hrs:  Hold oral antihyperglycemic  Follow sliding scale, follow hypoglycemia precaution    "

## 2025-05-09 NOTE — H&P
"H&P - Hospitalist   Name: Jw Ray 59 y.o. male I MRN: [id number]  Unit/Bed#: [facility] I Date of Admission: 2025   Date of Service: 2025 I Hospital Day: 0     Assessment & Plan  Gross hematuria  Pt presented to this ED with spouse from home c/o blood in urine with clots states worsened over past couple days.   Patient had a similar episode last year  Status post three-way Green in place, was started on CBI  Hemodynamically stable  Avoid any anticoagulation  Check H&H  ER provider discussed with on-call urology, recommending continue treatment with CBI and monitor hemodynamically, if condition worse, can reach out to urology for transfer  CT scan showin.  Large dependent lobulated hyperdensity in the urinary bladder, likely blood clot, although neoplasm is not excluded on this noncontrast study.   2.  Stable appearance of left hydronephrosis containing high density material, which also may represent blood products, although neoplasm again not excluded. Urology consultation is recommended   Hydronephrosis of left kidney  Stable appearance of left hydronephrosis containing high density material, which also may represent blood products, although neoplasm again not excluded. Urology consultation is recommended   Patient came with hematuria, status post three-way Green in place with CBI  Continue current treatment, and monitor  Chronic combined systolic and diastolic heart failure (HCC)  Wt Readings from Last 3 Encounters:   25 86.1 kg (189 lb 13.1 oz)   24 84.1 kg (185 lb 6.5 oz)     Remain euvolemic  Continue home medication          Type 2 diabetes mellitus (HCC)  Lab Results   Component Value Date    HGBA1C 8 (H) 2025       No results for input(s): \"POCGLU\" in the last 72 hours.    Blood Sugar Average: Last 72 hrs:  Hold oral antihyperglycemic  Follow sliding scale, follow hypoglycemia precaution        VTE Pharmacologic Prophylaxis: VTE Score: 2 Low Risk (Score 0-2) - " Encourage Ambulation.  Code Status: Level 1 - Full Code per patient  Discussion with family:  With patient.     Anticipated Length of Stay: Patient will be admitted on an inpatient basis with an anticipated length of stay of greater than 2 midnights secondary to hematuria.    History of Present Illness   Chief Complaint: Blood in urine    Jw Ray is a 59 y.o. male with a PMH of CHF, diabetes who presents with came with blood in the urine.  Per patient, started few days ago, and is getting worse.  Patient reports last year patient had a similar episode.  Denies any nausea, vomiting..    Review of Systems   Constitutional:  Positive for activity change. Negative for appetite change, chills, diaphoresis, fatigue and unexpected weight change.   Respiratory:  Negative for apnea, chest tightness, shortness of breath and stridor.    Cardiovascular:  Negative for chest pain, palpitations and leg swelling.   Gastrointestinal:  Negative for abdominal distention, diarrhea, nausea, rectal pain and vomiting.   Genitourinary:  Positive for hematuria. Negative for difficulty urinating.   Neurological:  Negative for dizziness.   Hematological:  Negative for adenopathy.   Psychiatric/Behavioral:  Negative for agitation and behavioral problems.    All other systems reviewed and are negative.      Historical Information   Past Medical History:   Diagnosis Date    CHF (congestive heart failure) (HCC)     DM (diabetes mellitus) (HCC)     HTN (hypertension)      Past Surgical History:   Procedure Laterality Date    CARDIAC SURGERY       Social History     Tobacco Use    Smoking status: Never    Smokeless tobacco: Current     Types: Chew   Substance and Sexual Activity    Alcohol use: Not Currently    Drug use: Never    Sexual activity: Not on file     E-Cigarette/Vaping     E-Cigarette/Vaping Substances     History reviewed. No pertinent family history.  Social History:  Marital Status: /Civil Union   Occupation:  Unknown  Patient Pre-hospital Living Situation: Home  Patient Pre-hospital Level of Mobility: walks  Patient Pre-hospital Diet Restrictions: Dx/diabetic diet    Meds/Allergies   I have reviewed home medications with patient personally.  Prior to Admission medications    Medication Sig Start Date End Date Taking? Authorizing Provider   atorvastatin (LIPITOR) 40 mg tablet Take 40 mg by mouth daily 4/23/24   Historical Provider, MD   carvedilol (COREG) 25 mg tablet Take 25 mg by mouth 2 (two) times a day with meals 4/23/24   Historical Provider, MD   Empagliflozin 25 MG TABS Take 25 mg by mouth daily 4/23/24   Historical Provider, MD   finasteride (PROSCAR) 5 mg tablet Take 1 tablet (5 mg total) by mouth daily 7/17/24   Frida Burton PA-C   gabapentin (NEURONTIN) 100 mg capsule Take 100 mg by mouth daily 4/23/24   Historical Provider, MD   glyBURIDE (DIABETA) 2.5 mg tablet Take 2.5 mg by mouth daily with breakfast    Historical Provider, MD   metFORMIN (GLUCOPHAGE) 1000 MG tablet Take 1 tablet by mouth 2 (two) times a day with meals 4/23/24   Historical Provider, MD   nicotine (NICODERM CQ) 14 mg/24hr TD 24 hr patch Place 1 patch on the skin over 24 hours daily 7/17/24   Frida Burton PA-C   phenazopyridine (PYRIDIUM) 100 mg tablet Take 1 tablet (100 mg total) by mouth 3 (three) times a day as needed for bladder spasms 7/16/24   Frida Burton PA-C   sacubitril-valsartan (Entresto) 49-51 MG TABS Take 1 tablet by mouth 2 (two) times a day 4/23/24   Historical Provider, MD   semaglutide, 1 mg/dose, (Ozempic, 1 MG/DOSE,) 2 mg/1.5 mL injection pen Inject 1 mg under the skin Once a week 4/23/24   Historical Provider, MD   sildenafil (VIAGRA) 25 MG tablet Take 25 mg by mouth daily as needed 4/23/24   Historical Provider, MD   spironolactone (ALDACTONE) 25 mg tablet Take 25 mg by mouth daily 4/23/24   Historical Provider, MD     Allergies   Allergen Reactions    Codeine Nausea Only       Objective :  Temp:  [97.3 °F (36.3  °C)-98.3 °F (36.8 °C)] 97.3 °F (36.3 °C)  HR:  [95] 95  BP: (117-158)/(79-95) 117/79  Resp:  [18] 18  SpO2:  [98 %] 98 %  O2 Device: None (Room air)    Physical Exam  Vitals and nursing note reviewed.   Constitutional:       Appearance: Normal appearance. He is not ill-appearing or diaphoretic.   HENT:      Mouth/Throat:      Mouth: Mucous membranes are moist.   Eyes:      General: No scleral icterus.        Left eye: No discharge.      Extraocular Movements: Extraocular movements intact.      Pupils: Pupils are equal, round, and reactive to light.   Cardiovascular:      Rate and Rhythm: Normal rate.      Heart sounds: No murmur heard.     No friction rub. No gallop.   Pulmonary:      Effort: Pulmonary effort is normal. No respiratory distress.      Breath sounds: No stridor. No wheezing or rhonchi.   Abdominal:      General: There is no distension.      Palpations: There is no mass.      Tenderness: There is no abdominal tenderness. There is no right CVA tenderness, left CVA tenderness, guarding or rebound.      Hernia: No hernia is present.   Genitourinary:     Comments: Green in place, with bloody urine  Skin:     Capillary Refill: Capillary refill takes less than 2 seconds.   Neurological:      General: No focal deficit present.      Mental Status: He is alert and oriented to person, place, and time.      Cranial Nerves: No cranial nerve deficit.      Sensory: No sensory deficit.      Motor: No weakness.      Coordination: Coordination normal.         Lines/Drains:  Lines/Drains/Airways       Active Status       Name Placement date Placement time Site Days    Urethral Catheter Three way 20 Fr. 05/09/25  1200  Three way  less than 1                  Urinary Catheter:  Goal for removal: Voiding trial when ambulation improves               Lab Results: I have reviewed the following results:  Results from last 7 days   Lab Units 05/09/25  1113   WBC Thousand/uL 10.20*   HEMOGLOBIN g/dL 13.3   HEMATOCRIT % 40.5    PLATELETS Thousands/uL 201   SEGS PCT % 68   LYMPHO PCT % 23   MONO PCT % 6   EOS PCT % 2     Results from last 7 days   Lab Units 05/09/25  1113   SODIUM mmol/L 136   POTASSIUM mmol/L 4.5   CHLORIDE mmol/L 104   CO2 mmol/L 23   BUN mg/dL 17   CREATININE mg/dL 0.94   ANION GAP mmol/L 9   CALCIUM mg/dL 9.1   ALBUMIN g/dL 4.6   TOTAL BILIRUBIN mg/dL 0.60   ALK PHOS U/L 41   ALT U/L 11   AST U/L 12*   GLUCOSE RANDOM mg/dL 190*     Results from last 7 days   Lab Units 05/09/25  1113   INR  0.99         Lab Results   Component Value Date    HGBA1C 8 (H) 04/28/2025    HGBA1C 9.1 (H) 01/10/2024    HGBA1C 9.1 (H) 07/01/2023           Imaging Results Review: I reviewed radiology reports from this admission including: CT abdomen/pelvis.  Other Study Results Review: No additional pertinent studies reviewed.    Administrative Statements       ** Please Note: This note has been constructed using a voice recognition system. **

## 2025-05-09 NOTE — ASSESSMENT & PLAN NOTE
Wt Readings from Last 3 Encounters:   05/09/25 86.1 kg (189 lb 13.1 oz)   07/13/24 84.1 kg (185 lb 6.5 oz)     Remain euvolemic  Continue home medication

## 2025-05-09 NOTE — CASE MANAGEMENT
Case Management Assessment & Discharge Planning Note    Patient name Jw Ray  Location /-01 MRN [id number]  : 1965 Date 2025       Current Admission Date: 2025  Current Admission Diagnosis:Gross hematuria   Patient Active Problem List    Diagnosis Date Noted Date Diagnosed    Hydronephrosis of left kidney 2025     Benign prostatic hyperplasia with urinary retention 2024     Gross hematuria 2023     Pure hypercholesterolemia 2023     Chronic combined systolic and diastolic heart failure (HCC) 2022     Vitamin D deficiency 2020     Presence of stent in coronary artery 2020     Ischemic cardiomyopathy 10/29/2018     Coronary artery disease due to lipid rich plaque 2013     Essential hypertension 2013     Type 2 diabetes mellitus (HCC) 2011     Long term current use of anticoagulant therapy 12/15/2010       LOS (days): 0  Geometric Mean LOS (GMLOS) (days):   Days to GMLOS:     OBJECTIVE:         CM consult placed for post acute placement /medications.                Current admission status: Inpatient       Preferred Pharmacy:   [facility] [address] [place] [id number]  Phone: [phone] Fax: [phone]    [facility] [address] [place] [address] [address] [facility]  [address] [facility] [address]  Phone: [phone] Fax: [phone]    Primary Care Provider: Thanh Echeverria MD    Primary Insurance: BLUE CROSS  Secondary Insurance:     ASSESSMENT:  Active Health Care Proxies    There are no active Health Care Proxies on file.       Advance Directives  Does patient have a Health Care POA?: No  Was patient offered paperwork?: Yes (pt declined)  Does patient currently have a Health Care decision maker?: Yes, please see Health Care Proxy section  Does patient have Advance Directives?: No  Was patient offered paperwork?: Yes (pt  declined)  Primary Contact: Priscilla Ray - spouse         Readmission Root Cause  30 Day Readmission: No    Patient Information  Admitted from:: Home  Mental Status: Alert  During Assessment patient was accompanied by: Not accompanied during assessment  Assessment information provided by:: Patient  Primary Caregiver: Self  Support Systems: Self, Spouse/significant other, Family members  [place] [facility] Residence: [facility]  What city do you live in?: [place]  Home entry access options. Select all that apply.: Stairs  Number of steps to enter home.: 1  Type of Current Residence: [facility]  Living Arrangements: Lives w/ Spouse/significant other  Is patient a ?: No    Activities of Daily Living Prior to Admission  Functional Status: Independent  Completes ADLs independently?: Yes  Ambulates independently?: Yes  Does patient use assisted devices?: No  Does patient currently own DME?: No  Does patient have a history of Outpatient Therapy (PT/OT)?: No  Does the patient have a history of Short-Term Rehab?: No  Does patient have a history of HHC?: No  Does patient currently have HHC?: No         Patient Information Continued  Income Source: Pension/assisted  Does patient have prescription coverage?: Yes  Can the patient afford their medications and any related supplies (such as glucometers or test strips)?: Yes  Does patient receive dialysis treatments?: No  Does patient have a history of substance abuse?: No  Does patient have a history of Mental Health Diagnosis?: No         Means of Transportation  Means of Transport to Appts:: Drives Self      Social Determinants of Health (SDOH)      Flowsheet Row Most Recent Value   Housing Stability    In the last 12 months, was there a time when you were not able to pay the mortgage or rent on time? N   In the past 12 months, how many times have you moved where you were living? 0   At any time in the past 12 months, were you homeless or living in a shelter (including now)? N    Transportation Needs    In the past 12 months, has lack of transportation kept you from medical appointments or from getting medications? no   In the past 12 months, has lack of transportation kept you from meetings, work, or from getting things needed for daily living? No   Food Insecurity    Within the past 12 months, you worried that your food would run out before you got the money to buy more. Never true   Within the past 12 months, the food you bought just didn't last and you didn't have money to get more. Never true   Utilities    In the past 12 months has the electric, gas, oil, or water company threatened to shut off services in your home? No            DISCHARGE DETAILS:    Discharge planning discussed with:: patient    CM met with pt to review role of CM and discuss any supports needed upon discharge. Baseline information obtained, pt indicates he lives at home with his spouse in a ranch home with 1 REAL. Pt indicates he is independent with ADLs, ambulates without any assistive devices, drives self. Pt indicated he had to retire due to multiple health issues, but remains active. CM will continue to follow and address discharge needs.    Freedom of Choice: Yes- return home.      CM contacted family/caregiver?: Yes  Were Treatment Team discharge recommendations reviewed with patient/caregiver?: Yes  Did patient/caregiver verbalize understanding of patient care needs?: Yes       Contacts  Patient Contacts: Priscilla Ray  Relationship to Patient:: Family  Contact Method: Phone  Phone Number: [phone]  Reason/Outcome: Discharge Planning

## 2025-05-09 NOTE — ASSESSMENT & PLAN NOTE
Stable appearance of left hydronephrosis containing high density material, which also may represent blood products, although neoplasm again not excluded. Urology consultation is recommended   Patient came with hematuria, status post three-way Green in place with CBI  Continue current treatment, and monitor

## 2025-05-09 NOTE — PLAN OF CARE
Problem: GENITOURINARY - ADULT  Goal: Absence of urinary retention  Description: INTERVENTIONS:- Assess patient’s ability to void and empty bladder- Monitor I/O- Bladder scan as needed- Discuss with physician/AP medications to alleviate retention as needed- Discuss catheterization for long term situations as appropriate  Outcome: Progressing  Goal: Urinary catheter remains patent  Description: INTERVENTIONS:- Assess patency of urinary catheter- If patient has a chronic brewer, consider changing catheter if non-functioning- Follow guidelines for intermittent irrigation of non-functioning urinary catheter  Outcome: Progressing     Problem: HEMATOLOGIC - ADULT  Goal: Maintains hematologic stability  Description: INTERVENTIONS- Assess for signs and symptoms of bleeding or hemorrhage- Monitor labs- Administer supportive blood products/factors as ordered and appropriate  Outcome: Progressing

## 2025-05-09 NOTE — ASSESSMENT & PLAN NOTE
Pt presented to this ED with spouse from home c/o blood in urine with clots states worsened over past couple days.   Patient had a similar episode last year  Status post three-way Green in place, was started on CBI  Hemodynamically stable  Avoid any anticoagulation  Check H&H  ER provider discussed with on-call urology, recommending continue treatment with CBI and monitor hemodynamically, if condition worse, can reach out to urology for transfer  CT scan showin.  Large dependent lobulated hyperdensity in the urinary bladder, likely blood clot, although neoplasm is not excluded on this noncontrast study.   2.  Stable appearance of left hydronephrosis containing high density material, which also may represent blood products, although neoplasm again not excluded. Urology consultation is recommended

## 2025-05-10 ENCOUNTER — HOSPITAL ENCOUNTER (INPATIENT)
Facility: HOSPITAL | Age: 60
LOS: 3 days | DRG: 657 | End: 2025-05-13
Attending: INTERNAL MEDICINE | Admitting: INTERNAL MEDICINE
Payer: COMMERCIAL

## 2025-05-10 VITALS
TEMPERATURE: 97.5 F | SYSTOLIC BLOOD PRESSURE: 99 MMHG | WEIGHT: 189.82 LBS | OXYGEN SATURATION: 96 % | HEIGHT: 67 IN | BODY MASS INDEX: 29.79 KG/M2 | RESPIRATION RATE: 20 BRPM | DIASTOLIC BLOOD PRESSURE: 67 MMHG | HEART RATE: 90 BPM

## 2025-05-10 DIAGNOSIS — R31.9 HEMATURIA: ICD-10-CM

## 2025-05-10 DIAGNOSIS — I10 ESSENTIAL HYPERTENSION: ICD-10-CM

## 2025-05-10 DIAGNOSIS — E78.00 PURE HYPERCHOLESTEROLEMIA: ICD-10-CM

## 2025-05-10 DIAGNOSIS — C68.9 UROTHELIAL CARCINOMA (HCC): ICD-10-CM

## 2025-05-10 DIAGNOSIS — I50.42 CHRONIC COMBINED SYSTOLIC AND DIASTOLIC HEART FAILURE (HCC): ICD-10-CM

## 2025-05-10 DIAGNOSIS — Z95.5 PRESENCE OF STENT IN CORONARY ARTERY: ICD-10-CM

## 2025-05-10 DIAGNOSIS — I25.5 ISCHEMIC CARDIOMYOPATHY: ICD-10-CM

## 2025-05-10 DIAGNOSIS — C68.9 UROTHELIAL CANCER (HCC): Primary | ICD-10-CM

## 2025-05-10 PROBLEM — D62 ABLA (ACUTE BLOOD LOSS ANEMIA): Status: ACTIVE | Noted: 2025-05-10

## 2025-05-10 LAB
ALBUMIN SERPL BCG-MCNC: 3.7 G/DL (ref 3.5–5)
ALP SERPL-CCNC: 34 U/L (ref 34–104)
ALT SERPL W P-5'-P-CCNC: 8 U/L (ref 7–52)
ANION GAP SERPL CALCULATED.3IONS-SCNC: 8 MMOL/L (ref 4–13)
AST SERPL W P-5'-P-CCNC: 9 U/L (ref 13–39)
BACTERIA UR CULT: NORMAL
BASOPHILS # BLD AUTO: 0.06 THOUSANDS/ÂΜL (ref 0–0.1)
BASOPHILS NFR BLD AUTO: 1 % (ref 0–1)
BILIRUB SERPL-MCNC: 0.52 MG/DL (ref 0.2–1)
BUN SERPL-MCNC: 19 MG/DL (ref 5–25)
CALCIUM SERPL-MCNC: 8.2 MG/DL (ref 8.4–10.2)
CHLORIDE SERPL-SCNC: 105 MMOL/L (ref 96–108)
CO2 SERPL-SCNC: 24 MMOL/L (ref 21–32)
CREAT SERPL-MCNC: 0.97 MG/DL (ref 0.6–1.3)
EOSINOPHIL # BLD AUTO: 0.15 THOUSAND/ÂΜL (ref 0–0.61)
EOSINOPHIL NFR BLD AUTO: 1 % (ref 0–6)
ERYTHROCYTE [DISTWIDTH] IN BLOOD BY AUTOMATED COUNT: 13 % (ref 11.6–15.1)
GFR SERPL CREATININE-BSD FRML MDRD: 85 ML/MIN/1.73SQ M
GLUCOSE SERPL-MCNC: 129 MG/DL (ref 65–140)
GLUCOSE SERPL-MCNC: 140 MG/DL (ref 65–140)
GLUCOSE SERPL-MCNC: 179 MG/DL (ref 65–140)
GLUCOSE SERPL-MCNC: 189 MG/DL (ref 65–140)
GLUCOSE SERPL-MCNC: 189 MG/DL (ref 65–140)
HCT VFR BLD AUTO: 31.6 % (ref 36.5–49.3)
HCT VFR BLD AUTO: 32.8 % (ref 36.5–49.3)
HCT VFR BLD AUTO: 32.9 % (ref 36.5–49.3)
HGB BLD-MCNC: 10.6 G/DL (ref 12–17)
HGB BLD-MCNC: 10.8 G/DL (ref 12–17)
HGB BLD-MCNC: 10.8 G/DL (ref 12–17)
IMM GRANULOCYTES # BLD AUTO: 0.04 THOUSAND/UL (ref 0–0.2)
IMM GRANULOCYTES NFR BLD AUTO: 0 % (ref 0–2)
LYMPHOCYTES # BLD AUTO: 2.55 THOUSANDS/ÂΜL (ref 0.6–4.47)
LYMPHOCYTES NFR BLD AUTO: 22 % (ref 14–44)
MCH RBC QN AUTO: 29.1 PG (ref 26.8–34.3)
MCHC RBC AUTO-ENTMCNC: 32.8 G/DL (ref 31.4–37.4)
MCV RBC AUTO: 89 FL (ref 82–98)
MONOCYTES # BLD AUTO: 0.97 THOUSAND/ÂΜL (ref 0.17–1.22)
MONOCYTES NFR BLD AUTO: 8 % (ref 4–12)
NEUTROPHILS # BLD AUTO: 7.74 THOUSANDS/ÂΜL (ref 1.85–7.62)
NEUTS SEG NFR BLD AUTO: 68 % (ref 43–75)
NRBC BLD AUTO-RTO: 0 /100 WBCS
PLATELET # BLD AUTO: 166 THOUSANDS/UL (ref 149–390)
PMV BLD AUTO: 10.6 FL (ref 8.9–12.7)
POTASSIUM SERPL-SCNC: 4.1 MMOL/L (ref 3.5–5.3)
PROT SERPL-MCNC: 5.8 G/DL (ref 6.4–8.4)
RBC # BLD AUTO: 3.71 MILLION/UL (ref 3.88–5.62)
SODIUM SERPL-SCNC: 137 MMOL/L (ref 135–147)
WBC # BLD AUTO: 11.51 THOUSAND/UL (ref 4.31–10.16)

## 2025-05-10 PROCEDURE — 82948 REAGENT STRIP/BLOOD GLUCOSE: CPT

## 2025-05-10 PROCEDURE — 99223 1ST HOSP IP/OBS HIGH 75: CPT | Performed by: INTERNAL MEDICINE

## 2025-05-10 PROCEDURE — NC001 PR NO CHARGE: Performed by: INTERNAL MEDICINE

## 2025-05-10 PROCEDURE — 85018 HEMOGLOBIN: CPT | Performed by: FAMILY MEDICINE

## 2025-05-10 PROCEDURE — NC001 PR NO CHARGE

## 2025-05-10 PROCEDURE — 99239 HOSP IP/OBS DSCHRG MGMT >30: CPT

## 2025-05-10 PROCEDURE — 85014 HEMATOCRIT: CPT | Performed by: FAMILY MEDICINE

## 2025-05-10 PROCEDURE — 80053 COMPREHEN METABOLIC PANEL: CPT | Performed by: FAMILY MEDICINE

## 2025-05-10 PROCEDURE — 85025 COMPLETE CBC W/AUTO DIFF WBC: CPT | Performed by: FAMILY MEDICINE

## 2025-05-10 RX ORDER — INSULIN LISPRO 100 [IU]/ML
1-6 INJECTION, SOLUTION INTRAVENOUS; SUBCUTANEOUS
Status: DISCONTINUED | OUTPATIENT
Start: 2025-05-11 | End: 2025-05-13 | Stop reason: HOSPADM

## 2025-05-10 RX ORDER — FINASTERIDE 5 MG/1
5 TABLET, FILM COATED ORAL DAILY
Status: CANCELLED | OUTPATIENT
Start: 2025-05-11

## 2025-05-10 RX ORDER — CARVEDILOL 25 MG/1
25 TABLET ORAL 2 TIMES DAILY WITH MEALS
Status: DISCONTINUED | OUTPATIENT
Start: 2025-05-11 | End: 2025-05-13

## 2025-05-10 RX ORDER — GABAPENTIN 100 MG/1
100 CAPSULE ORAL DAILY
Status: CANCELLED | OUTPATIENT
Start: 2025-05-11

## 2025-05-10 RX ORDER — ATORVASTATIN CALCIUM 40 MG/1
40 TABLET, FILM COATED ORAL DAILY
Status: CANCELLED | OUTPATIENT
Start: 2025-05-11

## 2025-05-10 RX ORDER — OXYBUTYNIN CHLORIDE 5 MG/1
5 TABLET ORAL 3 TIMES DAILY
Status: DISCONTINUED | OUTPATIENT
Start: 2025-05-10 | End: 2025-05-13 | Stop reason: HOSPADM

## 2025-05-10 RX ORDER — ONDANSETRON 2 MG/ML
4 INJECTION INTRAMUSCULAR; INTRAVENOUS EVERY 6 HOURS PRN
Status: DISCONTINUED | OUTPATIENT
Start: 2025-05-10 | End: 2025-05-13 | Stop reason: HOSPADM

## 2025-05-10 RX ORDER — ATORVASTATIN CALCIUM 40 MG/1
40 TABLET, FILM COATED ORAL DAILY
Status: DISCONTINUED | OUTPATIENT
Start: 2025-05-11 | End: 2025-05-13 | Stop reason: HOSPADM

## 2025-05-10 RX ORDER — NICOTINE 21 MG/24HR
1 PATCH, TRANSDERMAL 24 HOURS TRANSDERMAL DAILY
Status: CANCELLED | OUTPATIENT
Start: 2025-05-11

## 2025-05-10 RX ORDER — OXYCODONE HYDROCHLORIDE 5 MG/1
5 TABLET ORAL EVERY 4 HOURS PRN
Refills: 0 | Status: DISCONTINUED | OUTPATIENT
Start: 2025-05-10 | End: 2025-05-13 | Stop reason: HOSPADM

## 2025-05-10 RX ORDER — FINASTERIDE 5 MG/1
5 TABLET, FILM COATED ORAL DAILY
Status: DISCONTINUED | OUTPATIENT
Start: 2025-05-11 | End: 2025-05-13 | Stop reason: HOSPADM

## 2025-05-10 RX ORDER — NICOTINE 21 MG/24HR
1 PATCH, TRANSDERMAL 24 HOURS TRANSDERMAL DAILY
Status: DISCONTINUED | OUTPATIENT
Start: 2025-05-11 | End: 2025-05-13 | Stop reason: HOSPADM

## 2025-05-10 RX ORDER — INSULIN LISPRO 100 [IU]/ML
1-6 INJECTION, SOLUTION INTRAVENOUS; SUBCUTANEOUS
Status: CANCELLED | OUTPATIENT
Start: 2025-05-10

## 2025-05-10 RX ORDER — ONDANSETRON 2 MG/ML
4 INJECTION INTRAMUSCULAR; INTRAVENOUS EVERY 6 HOURS PRN
Status: CANCELLED | OUTPATIENT
Start: 2025-05-10

## 2025-05-10 RX ORDER — OXYCODONE HYDROCHLORIDE 5 MG/1
5 TABLET ORAL EVERY 4 HOURS PRN
Refills: 0 | Status: CANCELLED | OUTPATIENT
Start: 2025-05-10

## 2025-05-10 RX ORDER — LORAZEPAM 2 MG/ML
0.5 INJECTION INTRAMUSCULAR ONCE
Status: COMPLETED | OUTPATIENT
Start: 2025-05-10 | End: 2025-05-10

## 2025-05-10 RX ORDER — OXYBUTYNIN CHLORIDE 5 MG/1
5 TABLET ORAL 3 TIMES DAILY
Status: CANCELLED | OUTPATIENT
Start: 2025-05-10

## 2025-05-10 RX ORDER — CARVEDILOL 12.5 MG/1
25 TABLET ORAL 2 TIMES DAILY WITH MEALS
Status: CANCELLED | OUTPATIENT
Start: 2025-05-10

## 2025-05-10 RX ORDER — ACETAMINOPHEN 325 MG/1
650 TABLET ORAL EVERY 6 HOURS PRN
Status: CANCELLED | OUTPATIENT
Start: 2025-05-10

## 2025-05-10 RX ORDER — INSULIN LISPRO 100 [IU]/ML
1-6 INJECTION, SOLUTION INTRAVENOUS; SUBCUTANEOUS
Status: DISCONTINUED | OUTPATIENT
Start: 2025-05-10 | End: 2025-05-13 | Stop reason: HOSPADM

## 2025-05-10 RX ORDER — LIDOCAINE HYDROCHLORIDE 20 MG/ML
1 JELLY TOPICAL ONCE
Status: COMPLETED | OUTPATIENT
Start: 2025-05-10 | End: 2025-05-10

## 2025-05-10 RX ORDER — ACETAMINOPHEN 325 MG/1
650 TABLET ORAL EVERY 6 HOURS PRN
Status: DISCONTINUED | OUTPATIENT
Start: 2025-05-10 | End: 2025-05-13 | Stop reason: HOSPADM

## 2025-05-10 RX ORDER — GABAPENTIN 100 MG/1
100 CAPSULE ORAL DAILY
Status: DISCONTINUED | OUTPATIENT
Start: 2025-05-11 | End: 2025-05-13 | Stop reason: HOSPADM

## 2025-05-10 RX ADMIN — INSULIN LISPRO 1 UNITS: 100 INJECTION, SOLUTION INTRAVENOUS; SUBCUTANEOUS at 21:28

## 2025-05-10 RX ADMIN — FINASTERIDE 5 MG: 5 TABLET, FILM COATED ORAL at 08:12

## 2025-05-10 RX ADMIN — NICOTINE 1 PATCH: 14 PATCH, EXTENDED RELEASE TRANSDERMAL at 08:12

## 2025-05-10 RX ADMIN — SACUBITRIL AND VALSARTAN 1 TABLET: 49; 51 TABLET, FILM COATED ORAL at 08:12

## 2025-05-10 RX ADMIN — OXYBUTYNIN CHLORIDE 5 MG: 5 TABLET ORAL at 16:14

## 2025-05-10 RX ADMIN — LORAZEPAM 0.5 MG: 2 INJECTION INTRAMUSCULAR; INTRAVENOUS at 21:01

## 2025-05-10 RX ADMIN — OXYCODONE HYDROCHLORIDE 5 MG: 5 TABLET ORAL at 02:21

## 2025-05-10 RX ADMIN — OXYBUTYNIN CHLORIDE 5 MG: 5 TABLET ORAL at 08:12

## 2025-05-10 RX ADMIN — OXYBUTYNIN CHLORIDE 5 MG: 5 TABLET ORAL at 20:53

## 2025-05-10 RX ADMIN — INSULIN LISPRO 1 UNITS: 100 INJECTION, SOLUTION INTRAVENOUS; SUBCUTANEOUS at 08:12

## 2025-05-10 RX ADMIN — GABAPENTIN 100 MG: 100 CAPSULE ORAL at 08:12

## 2025-05-10 RX ADMIN — ATORVASTATIN CALCIUM 40 MG: 40 TABLET, FILM COATED ORAL at 08:12

## 2025-05-10 RX ADMIN — LIDOCAINE HYDROCHLORIDE 1 APPLICATION: 20 JELLY TOPICAL at 20:53

## 2025-05-10 NOTE — HOSPITAL COURSE
Patient is a 59-year-old M with a PMH of CHF, HTN, CAD s/p PCI, T2DM who initially presented to [facility] on 5/9 for gross hematuria and decreased UOP. Three-way Green was placed and patient was started on CBI. CT showed large dependent lobulated hyperdensity in the urinary bladder and left hydronephrosis concerning for blood products vs neoplasm. Noted to have drop in Hgb from 13.3 to 10.8. Transferred to hospitals for urologic evaluation given acute blood loss anemia on 5/11. S/p clot evacuation and left ureteroscopy with ureteral stent placement and biopsy on 5/11 with concern for large volume, high grade appearing urothelial cancer.  Oncology was consulted.  CT/C/A/P ordered for cancer staging and showed no evidence of metastasis.  After long discussion with patient and family, patient decided that he would not want to undergo adjuvant chemotherapy recommended by oncology, but would proceed with surgery.  Given ongoing hematuria and worsening anemia, patient transferred to [facility] for urgent OR robotic procedure.  Cardiology consulted for preop clearance.  Stable for transfer

## 2025-05-10 NOTE — ASSESSMENT & PLAN NOTE
Pt presented to this ED with spouse from home c/o blood in urine with clots states worsened over past couple days. Patient had a similar episode last year. Status post three-way Green in place, was started on CBI at [facility]. Hemodynamically stable    CT scan showing: Large dependent lobulated hyperdensity in the urinary bladder, likely blood clot, although neoplasm is not excluded on this noncontrast study.  Stable appearance of left hydronephrosis containing high density material, which also may represent blood products, although neoplasm again not excluded. Urology consultation is recommended    Plan:  Urology consulted, appreciate recs  Avoid AC at this time  H&H q6h  Continue oxybutynin 5mg daily  Continue finasteride 5mg daily

## 2025-05-10 NOTE — PLAN OF CARE
Problem: GENITOURINARY - ADULT  Goal: Maintains or returns to baseline urinary function  Description: INTERVENTIONS:- Assess urinary function- Encourage oral fluids to ensure adequate hydration if ordered- Administer IV fluids as ordered to ensure adequate hydration- Administer ordered medications as needed- Offer frequent toileting- Follow urinary retention protocol if ordered  Outcome: Progressing  Goal: Absence of urinary retention  Description: INTERVENTIONS:- Assess patient’s ability to void and empty bladder- Monitor I/O- Bladder scan as needed- Discuss with physician/AP medications to alleviate retention as needed- Discuss catheterization for long term situations as appropriate  Outcome: Progressing  Goal: Urinary catheter remains patent  Description: INTERVENTIONS:- Assess patency of urinary catheter- If patient has a chronic brewer, consider changing catheter if non-functioning- Follow guidelines for intermittent irrigation of non-functioning urinary catheter  Outcome: Progressing     Problem: HEMATOLOGIC - ADULT  Goal: Maintains hematologic stability  Description: INTERVENTIONS- Assess for signs and symptoms of bleeding or hemorrhage- Monitor labs- Administer supportive blood products/factors as ordered and appropriate  Outcome: Progressing     Problem: PAIN - ADULT  Goal: Verbalizes/displays adequate comfort level or baseline comfort level  Description: Interventions:- Encourage patient to monitor pain and request assistance- Assess pain using appropriate pain scale- Administer analgesics based on type and severity of pain and evaluate response- Implement non-pharmacological measures as appropriate and evaluate response- Consider cultural and social influences on pain and pain management- Notify physician/advanced practitioner if interventions unsuccessful or patient reports new pain  Outcome: Progressing     Problem: SAFETY ADULT  Goal: Patient will remain free of falls  Description: INTERVENTIONS:-  Educate patient/family on patient safety including physical limitations- Instruct patient to call for assistance with activity - Consult OT/PT to assist with strengthening/mobility - Keep Call bell within reach- Keep bed low and locked with side rails adjusted as appropriate- Keep care items and personal belongings within reach- Initiate and maintain comfort rounds- Make Fall Risk Sign visible to staff- Offer Toileting every  Hours, in advance of need- Initiate/Maintain alarm- Obtain necessary fall risk management equipment: - Apply yellow socks and bracelet for high fall risk patients- Consider moving patient to room near nurses station  Outcome: Progressing  Goal: Maintain or return to baseline ADL function  Description: INTERVENTIONS:-  Assess patient's ability to carry out ADLs; assess patient's baseline for ADL function and identify physical deficits which impact ability to perform ADLs (bathing, care of mouth/teeth, toileting, grooming, dressing, etc.)- Assess/evaluate cause of self-care deficits - Assess range of motion- Assess patient's mobility; develop plan if impaired- Assess patient's need for assistive devices and provide as appropriate- Encourage maximum independence but intervene and supervise when necessary- Involve family in performance of ADLs- Assess for home care needs following discharge - Consider OT consult to assist with ADL evaluation and planning for discharge- Provide patient education as appropriate  Outcome: Progressing  Goal: Maintains/Returns to pre admission functional level  Description: INTERVENTIONS:- Perform AM-PAC 6 Click Basic Mobility/ Daily Activity assessment daily.- Set and communicate daily mobility goal to care team and patient/family/caregiver. - Collaborate with rehabilitation services on mobility goals if consulted- Perform Range of Motion  times a day.- Reposition patient every  hours.- Dangle patient  times a day- Stand patient  times a day- Ambulate patient  times a  day- Out of bed to chair  times a day - Out of bed for meals times a day- Out of bed for toileting- Record patient progress and toleration of activity level   Outcome: Progressing     Problem: DISCHARGE PLANNING  Goal: Discharge to home or other facility with appropriate resources  Description: INTERVENTIONS:- Identify barriers to discharge w/patient and caregiver- Arrange for needed discharge resources and transportation as appropriate- Identify discharge learning needs (meds, wound care, etc.)- Arrange for interpretive services to assist at discharge as needed- Refer to Case Management Department for coordinating discharge planning if the patient needs post-hospital services based on physician/advanced practitioner order or complex needs related to functional status, cognitive ability, or social support system  Outcome: Progressing     Problem: Knowledge Deficit  Goal: Patient/family/caregiver demonstrates understanding of disease process, treatment plan, medications, and discharge instructions  Description: Complete learning assessment and assess knowledge base.Interventions:- Provide teaching at level of understanding- Provide teaching via preferred learning methods  Outcome: Progressing

## 2025-05-10 NOTE — PLAN OF CARE
Problem: PAIN - ADULT  Goal: Verbalizes/displays adequate comfort level or baseline comfort level  Description: Interventions:- Encourage patient to monitor pain and request assistance- Assess pain using appropriate pain scale- Administer analgesics based on type and severity of pain and evaluate response- Implement non-pharmacological measures as appropriate and evaluate response- Consider cultural and social influences on pain and pain management- Notify physician/advanced practitioner if interventions unsuccessful or patient reports new pain  Outcome: Progressing     Problem: GENITOURINARY - ADULT  Goal: Absence of urinary retention  Description: INTERVENTIONS:- Assess patient’s ability to void and empty bladder- Monitor I/O- Bladder scan as needed- Discuss with physician/AP medications to alleviate retention as needed- Discuss catheterization for long term situations as appropriate  Outcome: Progressing

## 2025-05-10 NOTE — ASSESSMENT & PLAN NOTE
CAD s/p PCI in 2010 with BALAJI to OM 3. Maintained on ASA outpatient. Hold while ongoing gross hematuria and ABLA. Lipid panel 4/28/2025: 154/188/40/114    Plan:  Hold ASA  Continue Atorvastatin 40 mg daily

## 2025-05-10 NOTE — EMTALA/ACUTE CARE TRANSFER
[facility]  [address]  [place] [address]  Dept: [phone]      ACUTE CARE TRANSFER CONSENT    NAME Jw Ray                                         1965                              MRN [id number]    I have been informed of my rights regarding examination, treatment, and transfer by Lashanda Saez PA-C    Benefits: Specialized equipment and/or services available at the receiving facility ([facility])    Risks: Potential for delay in receiving treatment, Potential deterioration of medical condition, Loss of IV, Increased discomfort during transfer, Possible worsening of condition or death during transfer      Consent for Transfer:  I acknowledge that my medical condition has been evaluated and explained to me by the treating physician or other qualified medical person and/or my attending physician, who has recommended that I be transferred to the service of  Accepting Physician: Dr Kwadwo Jacob at Accepting Facility Name, City & State : [facility]. The above potential benefits of such transfer, the potential risks associated with such transfer, and the probable risks of not being transferred have been explained to me, and I fully understand them.  The doctor has explained that, in my case, the benefits of transfer outweigh the risks.  I agree to be transferred.    I authorize the performance of emergency medical procedures and treatments upon me in both transit and upon arrival at the receiving facility.  Additionally, I authorize the release of any and all medical records to the receiving facility and request they be transported with me, if possible.  I understand that the safest mode of transportation during a medical emergency is an ambulance and that the Hospital advocates the use of this mode of transport. Risks of traveling to the receiving facility by car, including absence of medical control, life sustaining  equipment, such as oxygen, and medical personnel has been explained to me and I fully understand them.    (JAIRON CORRECT BOX BELOW)  [  ]  I consent to the stated transfer and to be transported by ambulance/helicopter.  [  ]  I consent to the stated transfer, but refuse transportation by ambulance and accept full responsibility for my transportation by car.  I understand the risks of non-ambulance transfers and I exonerate the Hospital and its staff from any deterioration in my condition that results from this refusal.    X___________________________________________    DATE  05/10/25  TIME________  Signature of patient or legally responsible individual signing on patient behalf           RELATIONSHIP TO PATIENT_________________________          Provider Certification    NAME Jw Ray                                         1965                              MRN [id number]    A medical screening exam was performed on the above named patient.  Based on the examination:    Condition Necessitating Transfer hematuria    Patient Condition: The patient has been stabilized such that within reasonable medical probability, no material deterioration of the patient condition or the condition of the unborn child(wilfredo) is likely to result from the transfer    Reason for Transfer: Level of Care needed not available at this facility    Transfer Requirements: Facility [facility]   Space available and qualified personnel available for treatment as acknowledged by    Agreed to accept transfer and to provide appropriate medical treatment as acknowledged by       Dr Kwadwo Jacob  Appropriate medical records of the examination and treatment of the patient are provided at the time of transfer   STAFF INITIAL WHEN COMPLETED _______  Transfer will be performed by qualified personnel from    and appropriate transfer equipment as required, including the use of necessary and appropriate life support  measures.    Provider Certification: I have examined the patient and explained the following risks and benefits of being transferred/refusing transfer to the patient/family:  General risk, such as traffic hazards, adverse weather conditions, rough terrain or turbulence, possible failure of equipment (including vehicle or aircraft), or consequences of actions of persons outside the control of the transport personnel, Unanticipated needs of medical equipment and personnel during transport, Risk of worsening condition, The possibility of a transport vehicle being unavailable      Based on these reasonable risks and benefits to the patient and/or the unborn child(wilfredo), and based upon the information available at the time of the patient’s examination, I certify that the medical benefits reasonably to be expected from the provision of appropriate medical treatments at another medical facility outweigh the increasing risks, if any, to the individual’s medical condition, and in the case of labor to the unborn child, from effecting the transfer.    X__Lashanda Saez PA-C____ DATE 05/10/25        TIME_______      ORIGINAL - SEND TO MEDICAL RECORDS   COPY - SEND WITH PATIENT DURING TRANSFER

## 2025-05-10 NOTE — ASSESSMENT & PLAN NOTE
ABLA with Hgb drop > 2 from [facility]. Initial Hgb 13.3 -> 10.8    Plan:  Hold ASA iso bleed  Transfuse if Hgb < 7  H&H q6h

## 2025-05-10 NOTE — H&P
H&P - Internal Medicine   Name: Jw Ray 59 y.o. male I MRN: [id number]  Unit/Bed#: -01 I Date of Admission: 5/10/2025   Date of Service: 5/10/2025 I Hospital Day: 0     Assessment & Plan  Gross hematuria  Pt presented to this ED with spouse from home c/o blood in urine with clots states worsened over past couple days. Patient had a similar episode last year. Status post three-way Green in place, was started on CBI at [facility]. Hemodynamically stable    CT scan showing: Large dependent lobulated hyperdensity in the urinary bladder, likely blood clot, although neoplasm is not excluded on this noncontrast study.  Stable appearance of left hydronephrosis containing high density material, which also may represent blood products, although neoplasm again not excluded. Urology consultation is recommended    Plan:  Urology consulted, appreciate recs  Avoid AC at this time  H&H q6h  Continue oxybutynin 5mg daily  Continue finasteride 5mg daily  Chronic combined systolic and diastolic heart failure (HCC)  Wt Readings from Last 3 Encounters:   05/09/25 86.1 kg (189 lb 13.1 oz)   07/13/24 84.1 kg (185 lb 6.5 oz)     Last Echo 2021 or 2022 with LVEF of 45%    Plan:  Continue Coreg 25mg bid  Spironolactone 25mg daily  Entresto daily  I/O  Assess volume status daily  Essential hypertension  Patient maintained on Coreg 25mg bid, Spironolactone 25mg daily, and Entresto. Continue while inpatient.  Type 2 diabetes mellitus (HCC)  Lab Results   Component Value Date    HGBA1C 8 (H) 04/28/2025       Recent Labs     05/09/25  2100 05/10/25  0724 05/10/25  1114 05/10/25  1601   POCGLU 171* 179* 129 189*       Blood Sugar Average: Last 72 hrs:    Patient on MTF, semaglutide, and jardiance outpatient. Glucose within goal this admission    Plan:  SSI  Hypoglycemia protocol  Glucose checks QID before meals and at bedtime  Maintain -180  Hydronephrosis of left kidney  Stable appearance of left hydronephrosis containing high  density material, which also may represent blood products, although neoplasm again not excluded. Urology consultation is recommended   Patient came with hematuria, status post three-way Green in place with CBI  Continue current treatment, and monitor  Appreciate urology recs  ABLA (acute blood loss anemia)  ABLA with Hgb drop > 2 from [facility]. Initial Hgb 13.3 -> 10.8    Plan:  Hold ASA iso bleed  Transfuse if Hgb < 7  H&H q6h  CAD (coronary artery disease)  CAD s/p PCI in 2010 with BALAJI to OM 3. Maintained on ASA outpatient. Hold while ongoing gross hematuria and ABLA. Lipid panel 4/28/2025: 154/188/40/114    Plan:  Hold ASA  Continue Atorvastatin 40 mg daily    Code Status: Level 1 - Full Code  Admission Status: INPATIENT   Disposition: Patient requires Med Surg    Admit to team: SOD TEAM A    History of Present Illness   59-year-old M with a PMHx of HTN, T2DM, hematuria, CAD s/p PCI, CHF who presents as a transfer from [facility] for urology evaluation and possible intervention. Patient initially presented on 5/9 for abby hematuria and decreased UOP that started several days prior, resolved, then came back. Voided 5cc dark red blood in ED. Started on CBI. Patient with drop in Hgb from 13.3 to 10.8. Given acute blood loss anemia, transferred to [facility] for further urology evaluation and potential ureteroscopy on 5/11.    Of note, patient did have a similar episode last year with no source identified - resolved off aspirin. CT showing stable left hydronephrosis with high density material (blood products vs neoplasm), large dependent lobulated hyperdensity in urinary bladder.    Patient today states he is uncomfortable in terms of his bladder situation. He denies any other acute complaints. Patient stopped smoking tobacco in 2010, continues to chew. Endorses occasional alcohol use. Denies fever, chills, CP, SOB.    Review of Systems   All other systems reviewed and are negative.    Historical Information   Past Medical History:    Diagnosis Date    CHF (congestive heart failure) (HCC)     DM (diabetes mellitus) (HCC)     HTN (hypertension)      Past Surgical History:   Procedure Laterality Date    CARDIAC SURGERY       Social History     Tobacco Use    Smoking status: Never    Smokeless tobacco: Current     Types: Chew   Substance and Sexual Activity    Alcohol use: Not Currently    Drug use: Never    Sexual activity: Not on file     E-Cigarette/Vaping     E-Cigarette/Vaping Substances     No family history on file.  Social History     Tobacco Use    Smoking status: Never    Smokeless tobacco: Current     Types: Chew   Substance and Sexual Activity    Alcohol use: Not Currently    Drug use: Never    Sexual activity: Not on file       Objective :  Temp:  [97.5 °F (36.4 °C)-98.5 °F (36.9 °C)] 98.5 °F (36.9 °C)  HR:  [] 101  BP: ()/(55-72) 103/72  Resp:  [18-20] 20  SpO2:  [94 %-97 %] 94 %  O2 Device: None (Room air)    Intake & Output:  I/O       None          Weights:        There is no height or weight on file to calculate BMI.  Weight (last 2 days)       None          Physical Exam  Vitals reviewed.   Constitutional:       General: He is not in acute distress.     Appearance: Normal appearance. He is not toxic-appearing.   HENT:      Head: Normocephalic and atraumatic.   Cardiovascular:      Rate and Rhythm: Normal rate and regular rhythm.      Pulses: Normal pulses.      Heart sounds: Normal heart sounds. No murmur heard.     No gallop.   Pulmonary:      Effort: Pulmonary effort is normal. No respiratory distress.      Breath sounds: Normal breath sounds. No wheezing or rales.   Abdominal:      General: Abdomen is flat. There is no distension.      Palpations: Abdomen is soft.      Tenderness: There is no abdominal tenderness.   Genitourinary:     Comments: CBI in place draining light red urine  Musculoskeletal:      Right lower leg: No edema.      Left lower leg: No edema.   Skin:     General: Skin is warm and dry.    Neurological:      General: No focal deficit present.      Mental Status: He is alert. Mental status is at baseline.   Psychiatric:         Mood and Affect: Mood normal.         Behavior: Behavior normal.         Thought Content: Thought content normal.         Lab Results: I have reviewed the following results:  Recent Labs     05/09/25  1113 05/09/25  1616 05/10/25  0546 05/10/25  1605   WBC 10.20*  --  11.51*  --    HGB 13.3   < > 10.8* 10.6*   HCT 40.5   < > 32.9* 31.6*     --  166  --    SODIUM 136  --  137  --    K 4.5  --  4.1  --      --  105  --    CO2 23  --  24  --    BUN 17  --  19  --    CREATININE 0.94  --  0.97  --    GLUC 190*  --  140  --    AST 12*  --  9*  --    ALT 11  --  8  --    ALB 4.6  --  3.7  --    TBILI 0.60  --  0.52  --    ALKPHOS 41  --  34  --    PTT 26  --   --   --    INR 0.99  --   --   --     < > = values in this interval not displayed.     Micro:  Lab Results   Component Value Date    URINECX No Growth <1000 cfu/mL 05/09/2025    URINECX No Growth <1000 cfu/mL 07/13/2024     Currently Ordered Meds:   Current Facility-Administered Medications:     acetaminophen (TYLENOL) tablet 650 mg, Q6H PRN    [START ON 5/11/2025] atorvastatin (LIPITOR) tablet 40 mg, Daily    [START ON 5/11/2025] carvedilol (COREG) tablet 25 mg, BID With Meals    [START ON 5/11/2025] finasteride (PROSCAR) tablet 5 mg, Daily    [START ON 5/11/2025] gabapentin (NEURONTIN) capsule 100 mg, Daily    [START ON 5/11/2025] insulin lispro (HumALOG/ADMELOG) 100 units/mL subcutaneous injection 1-6 Units, TID AC **AND** [START ON 5/11/2025] Fingerstick Glucose (POCT), TID AC    insulin lispro (HumALOG/ADMELOG) 100 units/mL subcutaneous injection 1-6 Units, HS    [START ON 5/11/2025] nicotine (NICODERM CQ) 14 mg/24hr TD 24 hr patch 1 patch, Daily    ondansetron (ZOFRAN) injection 4 mg, Q6H PRN    oxybutynin (DITROPAN) tablet 5 mg, TID    oxyCODONE (ROXICODONE) IR tablet 5 mg, Q4H PRN    sacubitril-valsartan  "(ENTRESTO) 49-51 MG per tablet 1 tablet, BID  VTE Pharmacologic Prophylaxis: RX contraindicated due to: patient situation  VTE Mechanical Prophylaxis: SCDs    Administrative Statements   Portions of the record may have been created with voice recognition software.  Occasional wrong word or \"sound a like\" substitutions may have occurred due to the inherent limitations of voice recognition software.  Read the chart carefully and recognize, using context, where substitutions have occurred.  ==  Bernardo Flores MD  [facility]  Internal Medicine Residency PGY-1  "

## 2025-05-10 NOTE — DISCHARGE SUMMARY
Discharge Summary - Hospitalist   Name: Jw Ray 59 y.o. male I MRN: [id number]  Unit/Bed#: -01 I Date of Admission: 5/9/2025   Date of Service: 5/10/2025 I Hospital Day: 1     Assessment & Plan  Gross hematuria  Pt presented to this ED with spouse from home c/o blood in urine with clots states worsened over past couple days.   Patient had a similar episode last year  Status post three-way Green in place, was started on CBI  Hemodynamically stable  Avoid any anticoagulation  Check H&H  ER provider discussed with on-call urology, recommending continue treatment with CBI and monitor hemodynamically, if condition worse, can reach out to urology for transfer  CT scan showing: Large dependent lobulated hyperdensity in the urinary bladder, likely blood clot, although neoplasm is not excluded on this noncontrast study.  Stable appearance of left hydronephrosis containing high density material, which also may represent blood products, although neoplasm again not excluded. Urology consultation is recommended  Consider flomax if appropriate from urology end   Due to continued hematuria, urology recommending transfer for URS.  Resume CBI when patient arrives at [facility].  Patient being transferred to [facility] with SOD-C under Dr Kwadwo Jacob for further evaluation with urology  Hydronephrosis of left kidney  Stable appearance of left hydronephrosis containing high density material, which also may represent blood products, although neoplasm again not excluded. Urology consultation is recommended   Patient came with hematuria, status post three-way Green in place with CBI  Continue current treatment, and monitor  Appreciate urology recs  Chronic combined systolic and diastolic heart failure (HCC)  Wt Readings from Last 3 Encounters:   05/09/25 86.1 kg (189 lb 13.1 oz)   07/13/24 84.1 kg (185 lb 6.5 oz)     Remain euvolemic  Continue home medication  Type 2 diabetes mellitus (HCC)  Lab Results   Component Value Date     HGBA1C 8 (H) 04/28/2025       Recent Labs     05/09/25  1558 05/09/25  2100 05/10/25  0724 05/10/25  1114   POCGLU 140 171* 179* 129       Blood Sugar Average: Last 72 hrs:  (P) 154.75  Hold oral antihyperglycemic  Follow sliding scale, follow hypoglycemia precaution  ABLA (acute blood loss anemia)  With ABLA with hgb drop >2 from admission. Admission on 5/9 hgb 13.3 and on 5/10 hgb 10.8  Patient still with hematuria at this time  Holding aspirin at this time.   Transfuse prn.      Medical Problems       Resolved Problems  Date Reviewed: 5/10/2025   None       Discharging Physician / Practitioner: Lashanda Saez PA-C  PCP: Thanh Echeverria MD  Admission Date:   Admission Orders (From admission, onward)       Ordered        05/09/25 1351  INPATIENT ADMISSION  Once                          Discharge/Transfer Date: 05/10/25    Disposition:   Transfer to: [facility]  Reason for Transfer: urology evaluation, hematuria, ureteroscopy  Accepting Provider at [facility]: Dr Kwadwo Jacob     Consultations During Hospital Stay:  Urology    Procedures Performed:   none    Significant Findings / Test Results:   CT renal stone study abdomen pelvis wo contrast   Final Result by Abhay Almaguer MD (05/09 1204)      1.  Large dependent lobulated hyperdensity in the urinary bladder, likely blood clot, although neoplasm is not excluded on this noncontrast study.   2.  Stable appearance of left hydronephrosis containing high density material, which also may represent blood products, although neoplasm again not excluded. Urology consultation is recommended.   3.  Tiny dependent hyperdense foci within the second portion of the duodenum and a left mid abdominal small bowel loop, which may represent ingested material. Correlate with oral intake.         Workstation performed: [id number]           Hgb: 13.3 > 10.8  WBC: 11.51  UA: leukocytes: Glucose may cause decreased leukocyte values, protein: Greater than  300, glucose: Greater than 1000, bilirubin: Small, occult blood: Large; microscopic: RBC, WBC, epithelial cells, bacteria: Field obscured to enumerate    Incidental Findings:   none     Test Results Pending at Discharge (will require follow up):   Urine culture     Outpatient Tests Requested:  Further testing to be determined after discharge from [facility]    Complications: None    Reason for Admission: Hematuria    Hospital Course:   Jw Ray is a 59 y.o. male patient who originally presented to the hospital on 5/9/2025 due to blood in his urine at home.  Stated that it initially started a few days ago, resolved, then got worse again.  Was unable to urinate and came to the emergency department.  In the emergency department, three-way Green was placed and patient was started on CBI.  Urology recommended admission and monitoring with CBI and if patient worsen could be transferred for further intervention.  CT imaging with results above.  Patient continued to have hematuria while on CBI however no clots noted.  Did have acute blood loss anemia with hemoglobin drop of greater than 2 g.  After discussion with urology on 5/10, recommending transfer to [facility] for further evaluation and anticipating ureteroscopy on 5/11.  Okay for diet on 5/10.  Made n.p.o. at midnight for anticipated procedure on 5/11.  Patient and wife agreeable to transfer at this time.  Patient being transferred to [facility] under Dr Kwadwo SUAREZ with further urology workup.  Further testing to be determined after discharge from [facility].      The patient, initially admitted to the hospital as inpatient, was discharged earlier than expected given the following: Transfer to [facility].  Please see above list of diagnoses and related plan for additional information.     Condition at Discharge: fair    Discharge Day Visit / Exam:   Subjective: Seen and examined today, patient  "said that he is feeling okay right now.  He is a little upset that he needs to be transferred to [facility].  He has no pain at this time.  He is hopeful that we can get some answers for him regarding the hematuria because he would like to enjoy his [employer] and be able to go deep sea fishing again.  Vitals: Blood Pressure: 103/69 (05/10/25 0714)  Pulse: 82 (05/10/25 0714)  Temperature: 97.7 °F (36.5 °C) (05/10/25 0714)  Temp Source: Temporal (05/10/25 0714)  Respirations: 18 (05/10/25 0714)  Height: 5' 7\" (170.2 cm) (05/09/25 1056)  Weight - Scale: 86.1 kg (189 lb 13.1 oz) (05/09/25 1056)  SpO2: 95 % (05/10/25 0900)  Physical Exam  Vitals reviewed.   Constitutional:       General: He is not in acute distress.     Appearance: He is not ill-appearing or toxic-appearing.   HENT:      Head: Normocephalic and atraumatic.      Mouth/Throat:      Mouth: Mucous membranes are moist.   Cardiovascular:      Rate and Rhythm: Normal rate and regular rhythm.      Heart sounds: No murmur heard.  Pulmonary:      Effort: No respiratory distress.      Breath sounds: No stridor. No wheezing.   Abdominal:      General: Bowel sounds are normal. There is no distension.      Palpations: Abdomen is soft. There is no mass.      Tenderness: There is no abdominal tenderness.   Genitourinary:     Comments: Green in place with CBI running and hematuria noted; no clots at this time  Musculoskeletal:      Right lower leg: No edema.      Left lower leg: No edema.   Skin:     General: Skin is warm and dry.   Neurological:      Mental Status: He is alert and oriented to person, place, and time.   Psychiatric:         Mood and Affect: Mood normal.         Behavior: Behavior normal.         Discussion with Family: Updated  (wife) via phone.     Administrative Statements   Discharge Statement:  I have spent a total time of 49 minutes in caring for this patient on the day of the visit/encounter. >30 minutes of time was spent " on: Diagnostic results, Prognosis, Risks and benefits of tx options, Instructions for management, Patient and family education, Importance of tx compliance, Risk factor reductions, Impressions, Counseling / Coordination of care, Documenting in the medical record, Reviewing / ordering tests, medicine, procedures  , and Communicating with other healthcare professionals .    **Please Note: This note may have been constructed using a voice recognition system.**

## 2025-05-10 NOTE — ASSESSMENT & PLAN NOTE
Lab Results   Component Value Date    HGBA1C 8 (H) 04/28/2025       Recent Labs     05/09/25  1558 05/09/25  2100 05/10/25  0724   POCGLU 140 171* 179*       Blood Sugar Average: Last 72 hrs:  (P) [id number]  Hold oral antihyperglycemic  Follow sliding scale, follow hypoglycemia precaution

## 2025-05-10 NOTE — NURSING NOTE
Report given to Salma at \[facility]\"". Patient picked up by BLS at this time. 20 gauge IV remains in left antecubital. 3 way brewer catheter in place and capped.

## 2025-05-10 NOTE — ASSESSMENT & PLAN NOTE
With ABLA with hgb drop >2 from admission. Admission on 5/9 hgb 13.3 and on 5/10 hgb 10.8  Patient still with hematuria at this time  Holding aspirin at this time.   Transfuse prn.

## 2025-05-10 NOTE — TRANSPORTATION MEDICAL NECESSITY
"Section I - General Information    Name of Patient: Jw Ray                 : 1965    Medicare #: [id number]  Transport Date: 05/10/25 (PCS is valid for round trips on this date and for all repetitive trips in the 60-day range as noted below.)  Origin: [facility] SURG UNIT                                                         Destination: [facility]   Is the pt's stay covered under Medicare Part A (PPS/DRG)   []     Closest appropriate facility? If no, why is transport to more distant facility required? Yes  If hospice pt, is this transport related to pt's terminal illness? No       Section II - Medical Necessity Questionnaire  Ambulance transportation is medically necessary only if other means of transport are contraindicated or would be potentially harmful to the patient. To meet this requirement, the patient must either be \"bed confined\" or suffer from a condition such that transport by means other than ambulance is contraindicated by the patient's condition. The following questions must be answered by the medical professional signing below for this form to be valid:    1)  Describe the MEDICAL CONDITION (physical and/or mental) of this patient AT THE TIME OF AMBULANCE TRANSPORT that requires the patient to be transported in an ambulance and why transport by other means is contraindicated by the patient's condition:Patient here with hematuria, currently on CBI. Urology recommending transfer to [facility] for ureteroscopy tomorrow.     2) Is the patient \"bed confined\" as defined below?     Yes  To be \"be confined\" the patient must satisfy all three of the following conditions: (1) unable to get up from bed without Assistance; AND (2) unable to ambulate; AND (3) unable to sit in a chair or wheelchair.    3) Can this patient safely be transported by car or wheelchair van (i.e., seated during transport without a medical attendant or monitoring)?   No    4) In " addition to completing questions 1-3 above, please check any of the following conditions that apply*:   *Note: supporting documentation for any boxes checked must be maintained in the patient's medical records.  If hosp-hosp transfer, describe services needed at 2nd facility not available at 1st facility?   Hemodynamic monitoring required en route- Patient here with hematuria, currently on CBI. Urology recommending transfer to [facility] for ureteroscopy tomorrow.   Pt requires greater level of care than can be provided at [facility].     Section III - Signature of Physician or Healthcare Professional  I certify that the above information is true and correct based on my evaluation of this patient, and represent that the patient requires transport by ambulance and that other forms of transport are contraindicated. I understand that this information will be used by the Centers for Medicare and Medicaid Services (CMS) to support the determination of medical necessity for ambulance services, and I represent that I have personal knowledge of the patient's condition at time of transport.    []  If this box is checked, I also certify that the patient is physically or mentally incapable of signing the ambulance service's claim and that the institution with which I am affiliated has furnished care, services, or assistance to the patient.    My signature below is made on behalf of the patient pursuant to 42 CFR §424.36(b)(4). In accordance with 42 CFR §424.37, the specific reason(s) that the patient is physically or mentally incapable of signing the claim form is as follows: [place]      Signature of Physician* or Healthcare Professional______________________________________________________________  Signature Date 05/10/25 (For scheduled repetitive transports, this form is not valid for transports performed more than 60 days after this date)    Printed Name & Credentials of Physician or Healthcare Professional (MD, DO, RN, etc.)Doe  Thierno _______________________________  *Form must be signed by patient's attending physician for scheduled, repetitive transports. For non-repetitive, unscheduled ambulance transports, if unable to obtain the signature of the attending physician, any of the following may sign (choose appropriate option below)  [] Physician Assistant []  Clinical Nurse Specialist [x]  Registered Nurse  []  Nurse Practitioner  [] Discharge Planner

## 2025-05-10 NOTE — ASSESSMENT & PLAN NOTE
Stable appearance of left hydronephrosis containing high density material, which also may represent blood products, although neoplasm again not excluded. Urology consultation is recommended   Patient came with hematuria, status post three-way Green in place with CBI  Continue current treatment, and monitor  Appreciate urology recs

## 2025-05-10 NOTE — ASSESSMENT & PLAN NOTE
Wt Readings from Last 3 Encounters:   05/09/25 86.1 kg (189 lb 13.1 oz)   07/13/24 84.1 kg (185 lb 6.5 oz)     Last Echo 2021 or 2022 with LVEF of 45%    Plan:  Continue Coreg 25mg bid  Spironolactone 25mg daily  Entresto daily  I/O  Assess volume status daily

## 2025-05-10 NOTE — PROGRESS NOTES
"    INTERNAL MEDICINE RESIDENCY SENIOR ADMISSION NOTE     Name: Jw Ray   Age & Sex: 59 y.o. male   MRN: [id number]  Unit/Bed#: -[facility]   Encounter: [id number]  Primary Care Provider: Thanh Echeverria MD    Admit to team: SOD Team C     Patient seen and examined. Reviewed H&P per Dr. Flores . Agree with the assessment and plan with any exception/addition as noted below:    Per admitting doctor \"Jw Ray is a 59 y.o. male patient who originally presented to the hospital on 5/9/2025 due to blood in his urine at home.  Stated that it initially started a few days ago, resolved, then got worse again.  Was unable to urinate and came to the emergency department.  In the emergency department, three-way Green was placed and patient was started on CBI.  Urology recommended admission and monitoring with CBI and if patient worsen could be transferred for further intervention.  CT imaging with results above.  Patient continued to have hematuria while on CBI however no clots noted.  Did have acute blood loss anemia with hemoglobin drop of greater than 2 g.  After discussion with urology on 5/10, recommending transfer to [facility] for further evaluation and anticipating ureteroscopy on 5/11.  Okay for diet on 5/10.  Made n.p.o. at midnight for anticipated procedure on 5/11.  Patient and wife agreeable to transfer at this time.  Patient being transferred to [facility] under Dr Kwadwo SUAREZ with further urology workup.  Further testing to be determined after discharge from [facility]. \"    Patient is seen and examined.  Patient states this has been going on for the past 2 years.  He was seen by [facility] multiple times and states he did get cameras (possible cystoscopy) which did not find anything.  He states that roughly a week ago he started getting clots and then came to the ED.  He is currently on CBI.  Urology is consulted.  Patient has no complaints in " the ED. Of note, he does follow with cardiology however has not seen the doctor in almost 2 years.  He still takes the medications as documented.    Physical Exam  Vitals reviewed.   Constitutional:       Appearance: Normal appearance.   HENT:      Head: Normocephalic and atraumatic.      Mouth/Throat:      Mouth: Mucous membranes are moist.      Pharynx: Oropharynx is clear.   Cardiovascular:      Rate and Rhythm: Normal rate and regular rhythm.   Pulmonary:      Effort: Pulmonary effort is normal.      Breath sounds: Normal breath sounds.   Abdominal:      General: Abdomen is flat. Bowel sounds are normal. There is no distension.      Tenderness: There is no abdominal tenderness.   Genitourinary:     Comments: CBI  Musculoskeletal:      Right lower leg: No edema.      Left lower leg: No edema.   Skin:     General: Skin is warm and dry.   Neurological:      Mental Status: He is alert and oriented to person, place, and time.   Psychiatric:         Mood and Affect: Mood normal.         Behavior: Behavior normal.          Principal Problem:    Gross hematuria  Active Problems:    Chronic combined systolic and diastolic heart failure (HCC)    Essential hypertension    Type 2 diabetes mellitus (HCC)    Hydronephrosis of left kidney    ABLA (acute blood loss anemia)    Hematuria with acute blood loss anemia  - Presented to the ED from home complaining of clots in his blood  -CT scan did show lobulated hyperdensity in the urinary bladder  -Currently on CBI per urology  Continue CBI  Urology consulted  Plan for potential procedure tomorrow on 5/11-make n.p.o. at midnight  Continue oxybutynin and finasteride  Continue to follow hemoglobin q8-can d/c in morning if stable and then proceed with daily labs    2. CHF  -Last echo was EF of 45% with the etiology being ischemic cardiomyopathy  -Did have cardiac cath in 2010 showing complete occlusion of RCA, mid LAD with stent placed in OM 3  Continue Coreg, Entresto    3.  DM2  -A1c 8  SSI    Rest of care per H/P    Code Status: Level 1 - Full Code  Admission Status: INPATIENT   Disposition: Patient requires Med/Surg  Expected Length of Stay: >2MN

## 2025-05-10 NOTE — ASSESSMENT & PLAN NOTE
Lab Results   Component Value Date    HGBA1C 8 (H) 04/28/2025       Recent Labs     05/09/25  1558 05/09/25  2100 05/10/25  0724 05/10/25  1114   POCGLU 140 171* 179* 129       Blood Sugar Average: Last 72 hrs:  (P) 154.75  Hold oral antihyperglycemic  Follow sliding scale, follow hypoglycemia precaution

## 2025-05-10 NOTE — ASSESSMENT & PLAN NOTE
Pt presented to this ED with spouse from home c/o blood in urine with clots states worsened over past couple days.   Patient had a similar episode last year  Status post three-way Green in place, was started on CBI  Hemodynamically stable  Avoid any anticoagulation  Check H&H  ER provider discussed with on-call urology, recommending continue treatment with CBI and monitor hemodynamically, if condition worse, can reach out to urology for transfer  CT scan showing: Large dependent lobulated hyperdensity in the urinary bladder, likely blood clot, although neoplasm is not excluded on this noncontrast study.  Stable appearance of left hydronephrosis containing high density material, which also may represent blood products, although neoplasm again not excluded. Urology consultation is recommended  Urology consult pending  Consider flomax if appropriate from urology end

## 2025-05-10 NOTE — ASSESSMENT & PLAN NOTE
Lab Results   Component Value Date    HGBA1C 8 (H) 04/28/2025       Recent Labs     05/09/25  2100 05/10/25  0724 05/10/25  1114 05/10/25  1601   POCGLU 171* 179* 129 189*       Blood Sugar Average: Last 72 hrs:    Patient on MTF, semaglutide, and jardiance outpatient. Glucose within goal this admission    Plan:  [facility]  Hypoglycemia protocol  Glucose checks QID before meals and at bedtime  Maintain -180

## 2025-05-10 NOTE — ASSESSMENT & PLAN NOTE
Patient maintained on Coreg 25mg bid, Spironolactone 25mg daily, and Entresto. Continue while inpatient.

## 2025-05-10 NOTE — ASSESSMENT & PLAN NOTE
Pt presented to this ED with spouse from home c/o blood in urine with clots states worsened over past couple days.   Patient had a similar episode last year  Status post three-way Green in place, was started on CBI  Hemodynamically stable  Avoid any anticoagulation  Check H&H  ER provider discussed with on-call urology, recommending continue treatment with CBI and monitor hemodynamically, if condition worse, can reach out to urology for transfer  CT scan showing: Large dependent lobulated hyperdensity in the urinary bladder, likely blood clot, although neoplasm is not excluded on this noncontrast study.  Stable appearance of left hydronephrosis containing high density material, which also may represent blood products, although neoplasm again not excluded. Urology consultation is recommended  Consider flomax if appropriate from urology end   Due to continued hematuria, urology recommending transfer for URS.  Resume CBI when patient arrives at [facility].  Patient being transferred to [facility] with SOD-C under Dr Kwadwo Jacob for further evaluation with urology

## 2025-05-10 NOTE — UTILIZATION REVIEW
Initial Clinical Review    Admission: Date/Time/Statement:   Admission Orders (From admission, onward)       Ordered        25 1351  INPATIENT ADMISSION  Once                          Orders Placed This Encounter   Procedures    INPATIENT ADMISSION     Standing Status:   Standing     Number of Occurrences:   1     Level of Care:   Med Surg [16]     Estimated length of stay:   More than 2 Midnights     Certification:   I certify that inpatient services are medically necessary for this patient for a duration of greater than two midnights. See H&P and MD Progress Notes for additional information about the patient's course of treatment.     ED Arrival Information       Expected   -    Arrival   2025 10:50    Acuity   Urgent              Means of arrival   Walk-In    Escorted by   Self    Service   Hospitalist    Admission type   Emergency              Arrival complaint   blood clots in urine             Chief Complaint   Patient presents with    Blood in Urine     Pt presented to this ED with spouse from home c/o blood in urine with clots states worsened over past couple days. Pt mentioned was here for same last year. Denies pain/travel/sob/fevers/cough/n/v/d       Initial Presentation: 59 y.o. male to ED from home w/  PMH of CHF, diabetes who presents with came with blood in the urine.  Per patient, started few days ago, and is getting worse.  Patient reports last year patient had a similar episode.  Three way brewer placed and started on CBI . CT scan showin.  Large dependent lobulated hyperdensity in the urinary bladder, likely blood clot, although neoplasm is not excluded on this noncontrast study.   2.  Stable appearance of left hydronephrosis  Admitted IP status w/ gross hematuria , hydronephrosis of L kidney . Plan avoid anticoagulation , monitor H&H , cont CBI , urology consult. DM SSI and monitor .    Anticipated Length of Stay/Certification Statement:  Patient will be admitted on an inpatient basis  with an anticipated length of stay of greater than 2 midnights secondary to hematuria.     Date: 5/10   Day 2: no pain right now, but did have difficulty urinating prior to admission. Catheter in place with CBI running, currently with hematuria noted but no clots . Hgb 10.8. urology consult pending . Plan to transfer to [facility] for higher level of care .    ED Treatment-Medication Administration from 05/09/2025 1048 to 05/09/2025 1437         Date/Time Order Dose Route Action     05/09/2025 1136 lactated ringers bolus 1,000 mL 1,000 mL Intravenous New Bag     05/09/2025 1137 lidocaine (URO-JET) 2 % urethral/mucosal gel 1 Application 1 Application Urethral Given     05/09/2025 1333 fentaNYL injection 50 mcg 50 mcg Intravenous Given            Scheduled Medications:  atorvastatin, 40 mg, Oral, Daily  belladonna-opium, 30 mg, Rectal, Once  carvedilol, 25 mg, Oral, BID With Meals  finasteride, 5 mg, Oral, Daily  gabapentin, 100 mg, Oral, Daily  insulin lispro, 1-6 Units, Subcutaneous, TID AC  insulin lispro, 1-6 Units, Subcutaneous, HS  nicotine, 1 patch, Transdermal, Daily  oxybutynin, 5 mg, Oral, TID  sacubitril-valsartan, 1 tablet, Oral, BID      Continuous IV Infusions:     PRN Meds:  acetaminophen, 650 mg, Oral, Q6H PRN  ondansetron, 4 mg, Intravenous, Q6H PRN  oxyCODONE, 5 mg, Oral, Q4H PRN      ED Triage Vitals [05/09/25 1056]   Temperature Pulse Respirations Blood Pressure SpO2 Pain Score   98.3 °F (36.8 °C) 95 18 158/95 98 % No Pain     Weight (last 2 days)       Date/Time Weight    05/09/25 1056 86.1 (189.82)            Vital Signs (last 3 days)       Date/Time Temp Pulse Resp BP MAP (mmHg) SpO2 O2 Device Patient Position - Orthostatic VS Pain    05/10/25 07:14:36 97.7 °F (36.5 °C) 82 18 103/69 80 97 % None (Room air) Lying --    05/10/25 0221 -- -- -- -- -- -- -- -- 5    05/09/25 23:08:41 97.9 °F (36.6 °C) 83 -- 94/58 70 96 % None (Room air) -- No Pain    05/09/25 2148 -- -- -- -- -- -- None (Room air) -- --     05/09/25 1816 -- -- -- -- -- -- -- -- 5    05/09/25 1501 -- -- -- -- -- -- -- -- No Pain    05/09/25 14:41:30 97.3 °F (36.3 °C) -- -- 117/79 92 -- -- -- --    05/09/25 1333 -- -- -- -- -- -- -- -- 8    05/09/25 1056 98.3 °F (36.8 °C) 95 18 158/95 -- 98 % None (Room air) Lying No Pain              Pertinent Labs/Diagnostic Test Results:   Radiology:  CT renal stone study abdomen pelvis wo contrast   Final Interpretation by Abhay Almaguer MD (05/09 1204)      1.  Large dependent lobulated hyperdensity in the urinary bladder, likely blood clot, although neoplasm is not excluded on this noncontrast study.   2.  Stable appearance of left hydronephrosis containing high density material, which also may represent blood products, although neoplasm again not excluded. Urology consultation is recommended.   3.  Tiny dependent hyperdense foci within the second portion of the duodenum and a left mid abdominal small bowel loop, which may represent ingested material. Correlate with oral intake.         Workstation performed: [id number]           Cardiology:  No orders to display     GI:  No orders to display           Results from last 7 days   Lab Units 05/10/25  0546 05/09/25  2354 05/09/25  1616 05/09/25  1113   WBC Thousand/uL 11.51*  --   --  10.20*   HEMOGLOBIN g/dL 10.8* 10.8* 11.6* 13.3   HEMATOCRIT % 32.9* 32.8* 35.1* 40.5   PLATELETS Thousands/uL 166  --   --  201   TOTAL NEUT ABS Thousands/µL 7.74*  --   --  7.03         Results from last 7 days   Lab Units 05/10/25  0546 05/09/25  1113   SODIUM mmol/L 137 136   POTASSIUM mmol/L 4.1 4.5   CHLORIDE mmol/L 105 104   CO2 mmol/L 24 23   ANION GAP mmol/L 8 9   BUN mg/dL 19 17   CREATININE mg/dL 0.97 0.94   EGFR ml/min/1.73sq m 85 88   CALCIUM mg/dL 8.2* 9.1     Results from last 7 days   Lab Units 05/10/25  0546 05/09/25  1113   AST U/L 9* 12*   ALT U/L 8 11   ALK PHOS U/L 34 41   TOTAL PROTEIN g/dL 5.8* 7.0   ALBUMIN g/dL 3.7 4.6   TOTAL BILIRUBIN mg/dL 0.52 0.60      Results from last 7 days   Lab Units 05/10/25  0724 05/09/25  2100 05/09/25  1558   POC GLUCOSE mg/dl 179* 171* 140     Results from last 7 days   Lab Units 05/10/25  0546 05/09/25  1113   GLUCOSE RANDOM mg/dL 140 190*     Results from last 7 days   Lab Units 05/09/25  1113   PROTIME seconds 13.5   INR  0.99   PTT seconds 26     Results from last 7 days   Lab Units 05/09/25  1116   CLARITY UA  Turbid   COLOR UA  Red   SPEC GRAV UA  1.025   PH UA  7.5   GLUCOSE UA mg/dl >=1000 (1%)*   KETONES UA mg/dl Negative   BLOOD UA  Large*   PROTEIN UA mg/dl >=300*   NITRITE UA  Negative   BILIRUBIN UA  Small*   UROBILINOGEN UA E.U./dl 0.2   LEUKOCYTES UA  Elevated glucose may cause decreased leukocyte values. See urine microscopic for UWBC result*   WBC UA /hpf Field obscured, unable to enumerate*   RBC UA /hpf Field obscured, unable to enumerate*   BACTERIA UA /hpf Field obscured, unable to enumerate*   EPITHELIAL CELLS WET PREP /hpf Field obscured, unable to enumerate*         Past Medical History:   Diagnosis Date    CHF (congestive heart failure) (HCC)     DM (diabetes mellitus) (HCC)     HTN (hypertension)      Present on Admission:   Type 2 diabetes mellitus (HCC)   Chronic combined systolic and diastolic heart failure (HCC)   Gross hematuria      Admitting Diagnosis: Urinary retention [R33.9]  Blood in urine [R31.9]  Hematuria [R31.9]  Blood clot in bladder [N32.89]  Age/Sex: 59 y.o. male    Network Utilization Review Department  ATTENTION: Please call with any questions or concerns to [phone] and carefully listen to the prompts so that you are directed to the right person. All voicemails are confidential.   For Discharge needs, contact Care Management DC Support Team at [phone] opt. 2  Send all requests for admission clinical reviews, approved or denied determinations and any other requests to dedicated fax number below belonging to the campus where the patient is receiving treatment. List of dedicated  fax numbers for the Facilities:  FACILITY NAME UR FAX NUMBER   ADMISSION DENIALS (Administrative/Medical Necessity) [phone]   DISCHARGE SUPPORT TEAM (NETWORK) [phone]   PARENT CHILD HEALTH (Maternity/NICU/Pediatrics) [phone]   [facility] [phone]   [facility] [phone]   [facility] [phone]   [facility] [phone]   [facility] [phone]   [facility] [phone]   [facility] [phone]   [facility] [phone]   [facility] [phone]   [facility] [phone]   [facility] [phone]   [facility] [phone]

## 2025-05-10 NOTE — PROGRESS NOTES
Progress Note - Hospitalist   Name: Jw Ray 59 y.o. male I MRN: [id number]  Unit/Bed#: [facility] I Date of Admission: 5/9/2025   Date of Service: 5/10/2025 I Hospital Day: 1    Assessment & Plan  Gross hematuria  Pt presented to this ED with spouse from home c/o blood in urine with clots states worsened over past couple days.   Patient had a similar episode last year  Status post three-way Green in place, was started on CBI  Hemodynamically stable  Avoid any anticoagulation  Check H&H  ER provider discussed with on-call urology, recommending continue treatment with CBI and monitor hemodynamically, if condition worse, can reach out to urology for transfer  CT scan showing: Large dependent lobulated hyperdensity in the urinary bladder, likely blood clot, although neoplasm is not excluded on this noncontrast study.  Stable appearance of left hydronephrosis containing high density material, which also may represent blood products, although neoplasm again not excluded. Urology consultation is recommended  Urology consult pending  Consider flomax if appropriate from urology end   Hydronephrosis of left kidney  Stable appearance of left hydronephrosis containing high density material, which also may represent blood products, although neoplasm again not excluded. Urology consultation is recommended   Patient came with hematuria, status post three-way Green in place with CBI  Continue current treatment, and monitor  Appreciate urology recs  Chronic combined systolic and diastolic heart failure (HCC)  Wt Readings from Last 3 Encounters:   05/09/25 86.1 kg (189 lb 13.1 oz)   07/13/24 84.1 kg (185 lb 6.5 oz)     Remain euvolemic  Continue home medication  Type 2 diabetes mellitus (HCC)  Lab Results   Component Value Date    HGBA1C 8 (H) 04/28/2025       Recent Labs     05/09/25  1558 05/09/25  2100 05/10/25  0724   POCGLU 140 171* 179*       Blood Sugar Average: Last 72 hrs:  (P) [id number]  Hold oral  antihyperglycemic  Follow sliding scale, follow hypoglycemia precaution    VTE Pharmacologic Prophylaxis: VTE Score: 2 Low Risk (Score 0-2) - Encourage Ambulation.    Mobility:   Basic Mobility Inpatient Raw Score: 24  JH-HLM Goal: 8: Walk 250 feet or more  JH-HLM Achieved: 5: Stand (1 or more minutes)  JH-HLM Goal NOT achieved. Continue with multidisciplinary rounding and encourage appropriate mobility to improve upon JH-HLM goals.    Patient Centered Rounds: I performed bedside rounds with nursing staff today.   Discussions with Specialists or Other Care Team Provider: nursing, case management    Education and Discussions with Family / Patient: Patient declined call to .     Current Length of Stay: 1 day(s)  Current Patient Status: Inpatient   Certification Statement: The patient will continue to require additional inpatient hospital stay due to hematuria, CBI, urology consult  Discharge Plan: Anticipate discharge in 48-72 hrs to home.    Code Status: Level 1 - Full Code    Subjective   Seen and examined today, said that he is feeling well currently, no complaints at this time. Said that he has no pain right now, but did have difficulty urinating prior to admission. Currently he is glad that he is doing well and urine is without clots. No chest pain or SOB. No fevers or chills.     Objective :  Temp:  [97.3 °F (36.3 °C)-98.3 °F (36.8 °C)] 97.7 °F (36.5 °C)  HR:  [82-95] 82  BP: ()/(58-95) 103/69  Resp:  [18] 18  SpO2:  [96 %-98 %] 97 %  O2 Device: None (Room air)    Body mass index is 29.73 kg/m².     Input and Output Summary (last 24 hours):     Intake/Output Summary (Last 24 hours) at 5/10/2025 0850  Last data filed at 5/10/2025 0883  Gross per 24 hour   Intake 1720 ml   Output 4575 ml   Net -2855 ml       Physical Exam  Vitals reviewed.   Constitutional:       General: He is not in acute distress.     Appearance: He is not ill-appearing or toxic-appearing.   HENT:      Head: Normocephalic  and atraumatic.      Mouth/Throat:      Mouth: Mucous membranes are moist.   Cardiovascular:      Rate and Rhythm: Normal rate and regular rhythm.      Heart sounds: No murmur heard.  Pulmonary:      Effort: No respiratory distress.      Breath sounds: No stridor. No wheezing, rhonchi or rales.      Comments: Saturating well on room air  Abdominal:      General: Bowel sounds are normal. There is no distension.      Palpations: Abdomen is soft. There is no mass.      Tenderness: There is no abdominal tenderness.   Genitourinary:     Comments: Catheter in place with CBI running, currently with hematuria noted but no clots  Musculoskeletal:      Right lower leg: No edema.      Left lower leg: No edema.   Skin:     General: Skin is warm and dry.   Neurological:      Mental Status: He is alert and oriented to person, place, and time.   Psychiatric:         Mood and Affect: Mood normal.         Behavior: Behavior normal.           Lines/Drains:  Lines/Drains/Airways       Active Status       Name Placement date Placement time Site Days    Urethral Catheter Three way 20 Fr. 05/09/25  1200  Three way  less than 1                  Urinary Catheter:  Goal for removal:  voiding trial per urology recs                 Lab Results: I have reviewed the following results:   Results from last 7 days   Lab Units 05/10/25  0546   WBC Thousand/uL 11.51*   HEMOGLOBIN g/dL 10.8*   HEMATOCRIT % 32.9*   PLATELETS Thousands/uL 166   SEGS PCT % 68   LYMPHO PCT % 22   MONO PCT % 8   EOS PCT % 1     Results from last 7 days   Lab Units 05/10/25  0546   SODIUM mmol/L 137   POTASSIUM mmol/L 4.1   CHLORIDE mmol/L 105   CO2 mmol/L 24   BUN mg/dL 19   CREATININE mg/dL 0.97   ANION GAP mmol/L 8   CALCIUM mg/dL 8.2*   ALBUMIN g/dL 3.7   TOTAL BILIRUBIN mg/dL 0.52   ALK PHOS U/L 34   ALT U/L 8   AST U/L 9*   GLUCOSE RANDOM mg/dL 140     Results from last 7 days   Lab Units 05/09/25  1113   INR  0.99     Results from last 7 days   Lab Units  05/10/25  0724 05/09/25  2100 05/09/25  1558   POC GLUCOSE mg/dl 179* 171* 140               Recent Cultures (last 7 days):         Imaging Results Review: I reviewed radiology reports from this admission including: CT renal stone study abdomen pelvis.  Other Study Results Review: No additional pertinent studies reviewed.    Last 24 Hours Medication List:     Current Facility-Administered Medications:     acetaminophen (TYLENOL) tablet 650 mg, Q6H PRN    atorvastatin (LIPITOR) tablet 40 mg, Daily    belladonna-opium (B&O SUPPOSITORY) 16.2-30 mg suppository 1 suppository, Once    carvedilol (COREG) tablet 25 mg, BID With Meals    finasteride (PROSCAR) tablet 5 mg, Daily    gabapentin (NEURONTIN) capsule 100 mg, Daily    insulin lispro (HumALOG/ADMELOG) 100 units/mL subcutaneous injection 1-6 Units, TID AC **AND** Fingerstick Glucose (POCT), TID AC    insulin lispro (HumALOG/ADMELOG) 100 units/mL subcutaneous injection 1-6 Units, HS    nicotine (NICODERM CQ) 14 mg/24hr TD 24 hr patch 1 patch, Daily    ondansetron (ZOFRAN) injection 4 mg, Q6H PRN    oxybutynin (DITROPAN) tablet 5 mg, TID    oxyCODONE (ROXICODONE) IR tablet 5 mg, Q4H PRN    sacubitril-valsartan (ENTRESTO) 49-51 MG per tablet 1 tablet, BID    Administrative Statements   Today, Patient Was Seen By: Lashanda Saez PA-C    **Please Note: This note may have been constructed using a voice recognition system.**

## 2025-05-11 ENCOUNTER — ANESTHESIA EVENT (INPATIENT)
Dept: PERIOP | Facility: HOSPITAL | Age: 60
DRG: 657 | End: 2025-05-11
Payer: COMMERCIAL

## 2025-05-11 ENCOUNTER — APPOINTMENT (INPATIENT)
Dept: RADIOLOGY | Facility: HOSPITAL | Age: 60
DRG: 657 | End: 2025-05-11
Payer: COMMERCIAL

## 2025-05-11 ENCOUNTER — ANESTHESIA (INPATIENT)
Dept: PERIOP | Facility: HOSPITAL | Age: 60
DRG: 657 | End: 2025-05-11
Payer: COMMERCIAL

## 2025-05-11 LAB
ANION GAP SERPL CALCULATED.3IONS-SCNC: 7 MMOL/L (ref 4–13)
BUN SERPL-MCNC: 21 MG/DL (ref 5–25)
CALCIUM SERPL-MCNC: 8.2 MG/DL (ref 8.4–10.2)
CHLORIDE SERPL-SCNC: 107 MMOL/L (ref 96–108)
CO2 SERPL-SCNC: 24 MMOL/L (ref 21–32)
CREAT SERPL-MCNC: 0.84 MG/DL (ref 0.6–1.3)
ERYTHROCYTE [DISTWIDTH] IN BLOOD BY AUTOMATED COUNT: 12.9 % (ref 11.6–15.1)
GFR SERPL CREATININE-BSD FRML MDRD: 95 ML/MIN/1.73SQ M
GLUCOSE SERPL-MCNC: 136 MG/DL (ref 65–140)
GLUCOSE SERPL-MCNC: 155 MG/DL (ref 65–140)
GLUCOSE SERPL-MCNC: 169 MG/DL (ref 65–140)
GLUCOSE SERPL-MCNC: 182 MG/DL (ref 65–140)
GLUCOSE SERPL-MCNC: 255 MG/DL (ref 65–140)
GLUCOSE SERPL-MCNC: 263 MG/DL (ref 65–140)
HCT VFR BLD AUTO: 29.2 % (ref 36.5–49.3)
HCT VFR BLD AUTO: 30.1 % (ref 36.5–49.3)
HCT VFR BLD AUTO: 31.7 % (ref 36.5–49.3)
HCT VFR BLD AUTO: 31.9 % (ref 36.5–49.3)
HGB BLD-MCNC: 10.1 G/DL (ref 12–17)
HGB BLD-MCNC: 10.5 G/DL (ref 12–17)
HGB BLD-MCNC: 10.5 G/DL (ref 12–17)
HGB BLD-MCNC: 9.8 G/DL (ref 12–17)
MCH RBC QN AUTO: 30.1 PG (ref 26.8–34.3)
MCHC RBC AUTO-ENTMCNC: 33.6 G/DL (ref 31.4–37.4)
MCV RBC AUTO: 90 FL (ref 82–98)
PLATELET # BLD AUTO: 160 THOUSANDS/UL (ref 149–390)
PMV BLD AUTO: 10.7 FL (ref 8.9–12.7)
POTASSIUM SERPL-SCNC: 3.8 MMOL/L (ref 3.5–5.3)
RBC # BLD AUTO: 3.35 MILLION/UL (ref 3.88–5.62)
SODIUM SERPL-SCNC: 138 MMOL/L (ref 135–147)
WBC # BLD AUTO: 11.08 THOUSAND/UL (ref 4.31–10.16)

## 2025-05-11 PROCEDURE — C1758 CATHETER, URETERAL: HCPCS | Performed by: UROLOGY

## 2025-05-11 PROCEDURE — 85018 HEMOGLOBIN: CPT

## 2025-05-11 PROCEDURE — 74177 CT ABD & PELVIS W/CONTRAST: CPT

## 2025-05-11 PROCEDURE — 52001 CYSTO W/IRRG&EVAC MLT CLOTS: CPT | Performed by: UROLOGY

## 2025-05-11 PROCEDURE — 85018 HEMOGLOBIN: CPT | Performed by: UROLOGY

## 2025-05-11 PROCEDURE — 99222 1ST HOSP IP/OBS MODERATE 55: CPT | Performed by: UROLOGY

## 2025-05-11 PROCEDURE — 74420 UROGRAPHY RTRGR +-KUB: CPT

## 2025-05-11 PROCEDURE — 82948 REAGENT STRIP/BLOOD GLUCOSE: CPT

## 2025-05-11 PROCEDURE — 88305 TISSUE EXAM BY PATHOLOGIST: CPT | Performed by: PATHOLOGY

## 2025-05-11 PROCEDURE — C1894 INTRO/SHEATH, NON-LASER: HCPCS | Performed by: UROLOGY

## 2025-05-11 PROCEDURE — BT141ZZ FLUOROSCOPY OF KIDNEYS, URETERS AND BLADDER USING LOW OSMOLAR CONTRAST: ICD-10-PCS | Performed by: UROLOGY

## 2025-05-11 PROCEDURE — 99232 SBSQ HOSP IP/OBS MODERATE 35: CPT | Performed by: INTERNAL MEDICINE

## 2025-05-11 PROCEDURE — 85014 HEMATOCRIT: CPT

## 2025-05-11 PROCEDURE — 52332 CYSTOSCOPY AND TREATMENT: CPT | Performed by: UROLOGY

## 2025-05-11 PROCEDURE — 88341 IMHCHEM/IMCYTCHM EA ADD ANTB: CPT | Performed by: PATHOLOGY

## 2025-05-11 PROCEDURE — 88112 CYTOPATH CELL ENHANCE TECH: CPT | Performed by: PATHOLOGY

## 2025-05-11 PROCEDURE — C2625 STENT, NON-COR, TEM W/DEL SY: HCPCS | Performed by: UROLOGY

## 2025-05-11 PROCEDURE — 52354 CYSTOURETERO W/BIOPSY: CPT | Performed by: UROLOGY

## 2025-05-11 PROCEDURE — 80048 BASIC METABOLIC PNL TOTAL CA: CPT

## 2025-05-11 PROCEDURE — 85027 COMPLETE CBC AUTOMATED: CPT

## 2025-05-11 PROCEDURE — 85014 HEMATOCRIT: CPT | Performed by: UROLOGY

## 2025-05-11 PROCEDURE — C1747 URETEROSCOPE DIGITAL FLEX SNGL USE RVS DEFLECTION APTRA: HCPCS | Performed by: UROLOGY

## 2025-05-11 PROCEDURE — 88342 IMHCHEM/IMCYTCHM 1ST ANTB: CPT | Performed by: PATHOLOGY

## 2025-05-11 PROCEDURE — 0T778DZ DILATION OF LEFT URETER WITH INTRALUMINAL DEVICE, VIA NATURAL OR ARTIFICIAL OPENING ENDOSCOPIC: ICD-10-PCS | Performed by: UROLOGY

## 2025-05-11 PROCEDURE — C1769 GUIDE WIRE: HCPCS | Performed by: UROLOGY

## 2025-05-11 PROCEDURE — 0TC74ZZ EXTIRPATION OF MATTER FROM LEFT URETER, PERCUTANEOUS ENDOSCOPIC APPROACH: ICD-10-PCS | Performed by: UROLOGY

## 2025-05-11 PROCEDURE — 71260 CT THORAX DX C+: CPT

## 2025-05-11 DEVICE — INLAY OPTIMA URETERAL STENT W/O GUIDEWIRE
Type: IMPLANTABLE DEVICE | Site: URETER | Status: FUNCTIONAL
Brand: BARD® INLAY OPTIMA® URETERAL STENT

## 2025-05-11 RX ORDER — CEFAZOLIN SODIUM 2 G/50ML
2000 SOLUTION INTRAVENOUS ONCE
Status: CANCELLED | OUTPATIENT
Start: 2025-05-11 | End: 2025-05-11

## 2025-05-11 RX ORDER — ONDANSETRON 2 MG/ML
4 INJECTION INTRAMUSCULAR; INTRAVENOUS ONCE AS NEEDED
Status: DISCONTINUED | OUTPATIENT
Start: 2025-05-11 | End: 2025-05-11 | Stop reason: HOSPADM

## 2025-05-11 RX ORDER — FENTANYL CITRATE/PF 50 MCG/ML
25 SYRINGE (ML) INJECTION
Status: COMPLETED | OUTPATIENT
Start: 2025-05-11 | End: 2025-05-11

## 2025-05-11 RX ORDER — OXYBUTYNIN CHLORIDE 5 MG/1
5 TABLET ORAL 3 TIMES DAILY PRN
Qty: 30 TABLET | Refills: 0 | Status: CANCELLED | OUTPATIENT
Start: 2025-05-11 | End: 2025-05-21

## 2025-05-11 RX ORDER — MIDAZOLAM HYDROCHLORIDE 2 MG/2ML
INJECTION, SOLUTION INTRAMUSCULAR; INTRAVENOUS AS NEEDED
Status: DISCONTINUED | OUTPATIENT
Start: 2025-05-11 | End: 2025-05-11

## 2025-05-11 RX ORDER — OXYBUTYNIN CHLORIDE 5 MG/1
5 TABLET ORAL 3 TIMES DAILY
Status: DISCONTINUED | OUTPATIENT
Start: 2025-05-11 | End: 2025-05-11 | Stop reason: HOSPADM

## 2025-05-11 RX ORDER — FENTANYL CITRATE 50 UG/ML
INJECTION, SOLUTION INTRAMUSCULAR; INTRAVENOUS AS NEEDED
Status: DISCONTINUED | OUTPATIENT
Start: 2025-05-11 | End: 2025-05-11

## 2025-05-11 RX ORDER — DEXAMETHASONE SODIUM PHOSPHATE 10 MG/ML
INJECTION, SOLUTION INTRAMUSCULAR; INTRAVENOUS AS NEEDED
Status: DISCONTINUED | OUTPATIENT
Start: 2025-05-11 | End: 2025-05-11

## 2025-05-11 RX ORDER — OXYBUTYNIN CHLORIDE 5 MG/1
5 TABLET ORAL ONCE
Status: COMPLETED | OUTPATIENT
Start: 2025-05-11 | End: 2025-05-11

## 2025-05-11 RX ORDER — CEFAZOLIN SODIUM 1 G/3ML
INJECTION, POWDER, FOR SOLUTION INTRAMUSCULAR; INTRAVENOUS AS NEEDED
Status: DISCONTINUED | OUTPATIENT
Start: 2025-05-11 | End: 2025-05-11

## 2025-05-11 RX ORDER — EPHEDRINE SULFATE 50 MG/ML
INJECTION INTRAVENOUS AS NEEDED
Status: DISCONTINUED | OUTPATIENT
Start: 2025-05-11 | End: 2025-05-11

## 2025-05-11 RX ORDER — PHENAZOPYRIDINE HYDROCHLORIDE 200 MG/1
200 TABLET, FILM COATED ORAL
Qty: 6 TABLET | Refills: 0 | Status: CANCELLED | OUTPATIENT
Start: 2025-05-11 | End: 2025-05-13

## 2025-05-11 RX ORDER — HYDROMORPHONE HCL IN WATER/PF 6 MG/30 ML
0.2 PATIENT CONTROLLED ANALGESIA SYRINGE INTRAVENOUS
Status: DISCONTINUED | OUTPATIENT
Start: 2025-05-11 | End: 2025-05-11 | Stop reason: HOSPADM

## 2025-05-11 RX ORDER — CEFDINIR 300 MG/1
300 CAPSULE ORAL EVERY 12 HOURS SCHEDULED
Qty: 6 CAPSULE | Refills: 0 | Status: CANCELLED | OUTPATIENT
Start: 2025-05-11 | End: 2025-05-14

## 2025-05-11 RX ORDER — METOCLOPRAMIDE HYDROCHLORIDE 5 MG/ML
10 INJECTION INTRAMUSCULAR; INTRAVENOUS ONCE AS NEEDED
Status: DISCONTINUED | OUTPATIENT
Start: 2025-05-11 | End: 2025-05-11 | Stop reason: HOSPADM

## 2025-05-11 RX ORDER — ONDANSETRON 2 MG/ML
INJECTION INTRAMUSCULAR; INTRAVENOUS AS NEEDED
Status: DISCONTINUED | OUTPATIENT
Start: 2025-05-11 | End: 2025-05-11

## 2025-05-11 RX ORDER — LIDOCAINE HYDROCHLORIDE 10 MG/ML
INJECTION, SOLUTION EPIDURAL; INFILTRATION; INTRACAUDAL; PERINEURAL AS NEEDED
Status: DISCONTINUED | OUTPATIENT
Start: 2025-05-11 | End: 2025-05-11

## 2025-05-11 RX ORDER — SODIUM CHLORIDE, SODIUM LACTATE, POTASSIUM CHLORIDE, CALCIUM CHLORIDE 600; 310; 30; 20 MG/100ML; MG/100ML; MG/100ML; MG/100ML
INJECTION, SOLUTION INTRAVENOUS CONTINUOUS PRN
Status: DISCONTINUED | OUTPATIENT
Start: 2025-05-11 | End: 2025-05-11

## 2025-05-11 RX ORDER — PROPOFOL 10 MG/ML
INJECTION, EMULSION INTRAVENOUS AS NEEDED
Status: DISCONTINUED | OUTPATIENT
Start: 2025-05-11 | End: 2025-05-11

## 2025-05-11 RX ADMIN — OXYBUTYNIN CHLORIDE 5 MG: 5 TABLET ORAL at 21:31

## 2025-05-11 RX ADMIN — OXYCODONE HYDROCHLORIDE 5 MG: 5 TABLET ORAL at 16:15

## 2025-05-11 RX ADMIN — PHENYLEPHRINE HYDROCHLORIDE 200 MCG: 10 INJECTION INTRAVENOUS at 10:35

## 2025-05-11 RX ADMIN — SACUBITRIL AND VALSARTAN 1 TABLET: 49; 51 TABLET, FILM COATED ORAL at 07:52

## 2025-05-11 RX ADMIN — FENTANYL CITRATE 25 MCG: 50 INJECTION INTRAMUSCULAR; INTRAVENOUS at 11:15

## 2025-05-11 RX ADMIN — FINASTERIDE 5 MG: 5 TABLET, FILM COATED ORAL at 07:52

## 2025-05-11 RX ADMIN — CEFAZOLIN 2000 MG: 1 INJECTION, POWDER, FOR SOLUTION INTRAMUSCULAR; INTRAVENOUS at 09:57

## 2025-05-11 RX ADMIN — FENTANYL CITRATE 25 MCG: 50 INJECTION INTRAMUSCULAR; INTRAVENOUS at 11:43

## 2025-05-11 RX ADMIN — FENTANYL CITRATE 25 MCG: 50 INJECTION INTRAMUSCULAR; INTRAVENOUS at 09:52

## 2025-05-11 RX ADMIN — DEXAMETHASONE SODIUM PHOSPHATE 10 MG: 10 INJECTION, SOLUTION INTRAMUSCULAR; INTRAVENOUS at 09:52

## 2025-05-11 RX ADMIN — PHENYLEPHRINE HYDROCHLORIDE 200 MCG: 10 INJECTION INTRAVENOUS at 10:03

## 2025-05-11 RX ADMIN — OXYBUTYNIN CHLORIDE 5 MG: 5 TABLET ORAL at 12:19

## 2025-05-11 RX ADMIN — GABAPENTIN 100 MG: 100 CAPSULE ORAL at 07:52

## 2025-05-11 RX ADMIN — ATORVASTATIN CALCIUM 40 MG: 40 TABLET, FILM COATED ORAL at 07:52

## 2025-05-11 RX ADMIN — EPHEDRINE SULFATE 10 MG: 50 INJECTION, SOLUTION INTRAVENOUS at 10:21

## 2025-05-11 RX ADMIN — LIDOCAINE HYDROCHLORIDE 50 MG: 10 INJECTION, SOLUTION EPIDURAL; INFILTRATION; INTRACAUDAL; PERINEURAL at 09:52

## 2025-05-11 RX ADMIN — FENTANYL CITRATE 25 MCG: 50 INJECTION INTRAMUSCULAR; INTRAVENOUS at 11:26

## 2025-05-11 RX ADMIN — OXYCODONE HYDROCHLORIDE 5 MG: 5 TABLET ORAL at 12:19

## 2025-05-11 RX ADMIN — FENTANYL CITRATE 25 MCG: 50 INJECTION INTRAMUSCULAR; INTRAVENOUS at 10:02

## 2025-05-11 RX ADMIN — ONDANSETRON 4 MG: 2 INJECTION INTRAMUSCULAR; INTRAVENOUS at 09:52

## 2025-05-11 RX ADMIN — INSULIN LISPRO 3 UNITS: 100 INJECTION, SOLUTION INTRAVENOUS; SUBCUTANEOUS at 21:31

## 2025-05-11 RX ADMIN — PHENYLEPHRINE HYDROCHLORIDE 200 MCG: 10 INJECTION INTRAVENOUS at 10:08

## 2025-05-11 RX ADMIN — PROPOFOL 200 MG: 10 INJECTION, EMULSION INTRAVENOUS at 09:52

## 2025-05-11 RX ADMIN — MIDAZOLAM 2 MG: 1 INJECTION INTRAMUSCULAR; INTRAVENOUS at 09:45

## 2025-05-11 RX ADMIN — NICOTINE 1 PATCH: 14 PATCH, EXTENDED RELEASE TRANSDERMAL at 07:52

## 2025-05-11 RX ADMIN — SODIUM CHLORIDE, SODIUM LACTATE, POTASSIUM CHLORIDE, AND CALCIUM CHLORIDE: .6; .31; .03; .02 INJECTION, SOLUTION INTRAVENOUS at 09:39

## 2025-05-11 RX ADMIN — FENTANYL CITRATE 25 MCG: 50 INJECTION INTRAMUSCULAR; INTRAVENOUS at 10:13

## 2025-05-11 RX ADMIN — INSULIN LISPRO 3 UNITS: 100 INJECTION, SOLUTION INTRAVENOUS; SUBCUTANEOUS at 17:14

## 2025-05-11 RX ADMIN — PHENYLEPHRINE HYDROCHLORIDE 200 MCG: 10 INJECTION INTRAVENOUS at 09:54

## 2025-05-11 RX ADMIN — PHENYLEPHRINE HYDROCHLORIDE 200 MCG: 10 INJECTION INTRAVENOUS at 10:55

## 2025-05-11 RX ADMIN — PHENYLEPHRINE HYDROCHLORIDE 200 MCG: 10 INJECTION INTRAVENOUS at 09:53

## 2025-05-11 RX ADMIN — EPHEDRINE SULFATE 10 MG: 50 INJECTION, SOLUTION INTRAVENOUS at 10:11

## 2025-05-11 RX ADMIN — FENTANYL CITRATE 25 MCG: 50 INJECTION INTRAMUSCULAR; INTRAVENOUS at 11:37

## 2025-05-11 RX ADMIN — OXYBUTYNIN CHLORIDE 5 MG: 5 TABLET ORAL at 16:15

## 2025-05-11 RX ADMIN — FENTANYL CITRATE 25 MCG: 50 INJECTION INTRAMUSCULAR; INTRAVENOUS at 10:35

## 2025-05-11 RX ADMIN — IOHEXOL 100 ML: 350 INJECTION, SOLUTION INTRAVENOUS at 14:37

## 2025-05-11 RX ADMIN — OXYBUTYNIN CHLORIDE 5 MG: 5 TABLET ORAL at 07:52

## 2025-05-11 NOTE — PLAN OF CARE
Problem: PAIN - ADULT  Goal: Verbalizes/displays adequate comfort level or baseline comfort level  Description: Interventions:- Encourage patient to monitor pain and request assistance- Assess pain using appropriate pain scale- Administer analgesics based on type and severity of pain and evaluate response- Implement non-pharmacological measures as appropriate and evaluate response- Consider cultural and social influences on pain and pain management- Notify physician/advanced practitioner if interventions unsuccessful or patient reports new pain  Outcome: Progressing     Problem: INFECTION - ADULT  Goal: Absence or prevention of progression during hospitalization  Description: INTERVENTIONS:- Assess and monitor for signs and symptoms of infection- Monitor lab/diagnostic results- Monitor all insertion sites, i.e. indwelling lines, tubes, and drains- Monitor endotracheal if appropriate and nasal secretions for changes in amount and color- [place] appropriate cooling/warming therapies per order- Administer medications as ordered- Instruct and encourage patient and family to use good hand hygiene technique- Identify and instruct in appropriate isolation precautions for identified infection/condition  Outcome: Progressing  Goal: Absence of fever/infection during neutropenic period  Description: INTERVENTIONS:- Monitor WBC  Outcome: Progressing     Problem: SAFETY ADULT  Goal: Patient will remain free of falls  Description: INTERVENTIONS:- Educate patient/family on patient safety including physical limitations- Instruct patient to call for assistance with activity - Consult OT/PT to assist with strengthening/mobility - Keep Call bell within reach- Keep bed low and locked with side rails adjusted as appropriate- Keep care items and personal belongings within reach- Initiate and maintain comfort rounds- Make Fall Risk Sign visible to staff- Offer Toileting every 2 Hours, in advance of need- Initiate/Maintain bed alarm-  Apply yellow socks and bracelet for high fall risk patients- Consider moving patient to room near nurses station  Outcome: Progressing  Goal: Maintain or return to baseline ADL function  Description: INTERVENTIONS:-  Assess patient's ability to carry out ADLs; assess patient's baseline for ADL function and identify physical deficits which impact ability to perform ADLs (bathing, care of mouth/teeth, toileting, grooming, dressing, etc.)- Assess/evaluate cause of self-care deficits - Assess range of motion- Assess patient's mobility; develop plan if impaired- Assess patient's need for assistive devices and provide as appropriate- Encourage maximum independence but intervene and supervise when necessary- Involve family in performance of ADLs- Assess for home care needs following discharge - Consider OT consult to assist with ADL evaluation and planning for discharge- Provide patient education as appropriate  Outcome: Progressing  Goal: Maintains/Returns to pre admission functional level  Description: INTERVENTIONS:- Perform AM-PAC 6 Click Basic Mobility/ Daily Activity assessment daily.- Set and communicate daily mobility goal to care team and patient/family/caregiver. - Collaborate with rehabilitation services on mobility goals if consulted- Perform Range of Motion 3 times a day.- Reposition patient every 2 hours.- Dangle patient 3 times a day- Stand patient 3 times a day- Ambulate patient 3 times a day- Out of bed to chair 3 times a day - Out of bed for meals 3 times a day- Out of bed for toileting- Record patient progress and toleration of activity level   Outcome: Progressing     Problem: DISCHARGE PLANNING  Goal: Discharge to home or other facility with appropriate resources  Description: INTERVENTIONS:- Identify barriers to discharge w/patient and caregiver- Arrange for needed discharge resources and transportation as appropriate- Identify discharge learning needs (meds, wound care, etc.)- Arrange for  interpretive services to assist at discharge as needed- Refer to Case Management Department for coordinating discharge planning if the patient needs post-hospital services based on physician/advanced practitioner order or complex needs related to functional status, cognitive ability, or social support system  Outcome: Progressing     Problem: Knowledge Deficit  Goal: Patient/family/caregiver demonstrates understanding of disease process, treatment plan, medications, and discharge instructions  Description: Complete learning assessment and assess knowledge base.Interventions:- Provide teaching at level of understanding- Provide teaching via preferred learning methods  Outcome: Progressing

## 2025-05-11 NOTE — ANESTHESIA POSTPROCEDURE EVALUATION
Post-Op Assessment Note    CV Status:  Stable  Pain Score: 0    Pain management: adequate       Mental Status:  Alert and awake   Hydration Status:  Euvolemic   PONV Controlled:  Controlled   Airway Patency:  Patent     Post Op Vitals Reviewed: Yes    No anethesia notable event occurred.    Staff: CRNA           Last Filed PACU Vitals:  Vitals Value Taken Time   Temp 99.3 °F (37.4 °C) 05/11/25 1106   Pulse 99 05/11/25 1109   /73 05/11/25 1107   Resp 23 05/11/25 1108   SpO2 94 % 05/11/25 1109   Vitals shown include unfiled device data.

## 2025-05-11 NOTE — ANESTHESIA PREPROCEDURE EVALUATION
Procedure:  CYSTOSCOPY EVACUATION OF CLOTS, fulguration, possible bilateral ureteroscopy possible biopsy. (Bilateral: Bladder)    Relevant Problems   ANESTHESIA (within normal limits)      CARDIO   (+) CAD (coronary artery disease)   (+) Essential hypertension   (+) Presence of stent in coronary artery   (+) Pure hypercholesterolemia      ENDO   (+) Type 2 diabetes mellitus (HCC)      /RENAL   (+) Benign prostatic hyperplasia with urinary retention   (+) Hydronephrosis of left kidney      HEMATOLOGY   (+) ABLA (acute blood loss anemia)      Urinary   (+) Gross hematuria      Cardiovascular/Peripheral Vascular   (+) Chronic combined systolic and diastolic heart failure (HCC)   (+) Ischemic cardiomyopathy        Physical Exam    Airway    Mallampati score: III  TM Distance: >3 FB  Neck ROM: full     Dental   Comment: Several missing teeth; denies loose teeth     Cardiovascular      Pulmonary      Other Findings        Anesthesia Plan  ASA Score- 3     Anesthesia Type- general with ASA Monitors.         Additional Monitors:     Airway Plan: LMA.           Plan Factors-Exercise tolerance (METS): >4 METS.    Chart reviewed.   Existing labs reviewed. Patient summary reviewed.    Patient is not a current smoker.              Induction- intravenous.    Postoperative Plan- Plan for postoperative opioid use.     Perioperative Resuscitation Plan - Level 1 - Full Code.       Informed Consent- Anesthetic plan and risks discussed with patient.  I personally reviewed this patient with the CRNA. Discussed and agreed on the Anesthesia Plan with the CRNA..      NPO Status:  Vitals Value Taken Time   Date of last liquid 05/10/25 05/11/25 0822   Time of last liquid 2300 05/11/25 0822   Date of last solid 05/10/25 05/11/25 0822   Time of last solid 2300 05/11/25 0822

## 2025-05-11 NOTE — ASSESSMENT & PLAN NOTE
Hx of intermittent hematuria s/p CT urogram and cystoscopy (3/2024) with no identified source.   Initially presented to [facility] on 5/9 for gross hematuria and decreased UOP. Three-way Green placed and patient started on CBI. Transferred to [facility] for urology evaluation given acute blood loss anemia.     CT (5/9) Large dependent lobulated hyperdensity in the urinary bladder, likely blood clot, although neoplasm is not excluded. Stable appearance of left hydronephrosis containing high density material, likely representing blood products, although neoplasm again not excluded.     Plan:  Urology consulted, appreciate recommendations  Plan for cystoscopy clot evacuation possible fulguration and biopsy today  Monitor hemoglobin  Continue oxybutynin 5mg daily  Continue finasteride 5mg daily

## 2025-05-11 NOTE — ASSESSMENT & PLAN NOTE
Wt Readings from Last 3 Encounters:   05/10/25 86.1 kg (189 lb 13.1 oz)   05/09/25 86.1 kg (189 lb 13.1 oz)   07/13/24 84.1 kg (185 lb 6.5 oz)     Last Echo 2021 or 2022 with LVEF of 45%  Home GDMT: Coreg 25 mg BID, Aldactone 25 mg daily, Entresto 49-51 mg daily, Jardiance 25 mg daily    Plan:  Continue home Coreg, Aldactone, Entresto, Jardiance  I/O, daily weights

## 2025-05-11 NOTE — ASSESSMENT & PLAN NOTE
Noted to have Hgb drop > 2 from admission (Initial Hgb 13.3 > 10.8)  In setting of gross hematuria    Plan:  Urological evaluation as above  Hold ASA   Monitor hemoglobin  Transfuse if Hgb < 7

## 2025-05-11 NOTE — CONSULTS
Inpatient consult to Urology  Consult performed by: Roberta Mahoney PA-C  Consult ordered by: Lashanda Saez PA-C      : UROLOGY  Jw Ray 59 y.o. male [id number]   Unit/Bed #: MS Tam-01  Encounter: [id number]        Assessment  & Plan  :  Hematuria:  - Status post negative gross hematuria in 2024 with CT urogram and cystoscopy  -Has had intermittent bouts of hematuria last in July 2024 her CT scan revealed hydronephrosis on the left with hyperdensity/blood products this had resolved.  Repeat CT imaging this admission revealing stable and similar left-sided hydronephrosis with blood products.  Most likely patient experiencing bleeding from upper tract.  Along with blood products within urinary bladder.  - Patient with three-way Green catheter and CBI.  Transferred for closer evaluation.  Three-way Green catheter exchange for 24 Kinyarwanda three-way at bedside yesterday evening.  Unable to remove significant clot although CBI appears to be draining adequately.  Upon evaluation at 2 AM patient denied any issues with Green catheter.  However urine continued to be persistent light pink to punch in color running three fourths of the way open.  - Patient's hemoglobin fortunately has been stable 10.8, 10.8 10.6 10.5 overnight.  Repeat again this a.m. 10.1 appears as though hemoglobin slowly trending downward.    -Mild leukocytosis of 11 trending downward  - Patient afebrile hemodynamically stable  - Discussed with patient need for cystoscopy clot evacuation fulguration.  Believe patient has large formed clots in her not being removed with manual irrigation despite upsizing catheter.  Prior catheter was clogging significantly and poorly draining.  Patient also having severe intermittent bladder spasms which may be occurring/exacerbated by clot burden.  - Keep n.p.o.  - Added on for cystoscopy clot evacuation fulguration possible ureteroscopy biopsy.  Will defer surgical planning to  attending.  -  Continue oxybutynin for spasms, may be able to add Pyridium      Urology will continue to follow.      Subjective :    Jw Ray  is a 59 y.o. male with a past medical history of hypertension type 2 diabetes, CAD status post PCI, CHF and a past urologic history of gross hematuria with negative hematuria workup last urine with [facility] in 2024.  Status post negative cystoscopy and CT urogram negative.  He has had additional bouts of hematuria after this workup in July 2024 hematuria had resolved after discontinuing aspirin.  At that time with there was concern for upper tract bleeding which had resolved on its own.  Repeat CT imaging this admission revealing stable appearance of left-sided hydronephrosis containing high density material.  Along with large dependent lobulated hyperdensity in the bladder representing blood products although neoplasm again cannot be excluded.  Attempted to manage patient on CBI irrigations.  As previously.  Unfortunately due to persistent hematuria without improvement patient was transferred to [facility] for close evaluation with urology and possible surgical intervention.              Allergies   Allergen Reactions    Codeine Nausea Only      Current Outpatient Medications   Medication Instructions    atorvastatin (LIPITOR) 20 mg, Daily    carvedilol (COREG) 25 mg, 2 times daily with meals    Cholecalciferol (Vitamin D3) 50 MCG (2000 UT) CHEW Chew    Coenzyme Q10 (CoQ10) 100 MG CAPS Take by mouth    Empagliflozin 25 mg, Daily    finasteride (PROSCAR) 5 mg, Oral, Daily    gabapentin (NEURONTIN) 100 mg, Daily    glyBURIDE (DIABETA) 2.5 mg, Daily with breakfast    metFORMIN (GLUCOPHAGE) 1000 MG tablet 1 tablet, 2 times daily with meals    nicotine (NICODERM CQ) 14 mg/24hr TD 24 hr patch 1 patch, Transdermal, Daily    Omega 3 1000 MG CAPS Take by mouth    phenazopyridine (PYRIDIUM) 100 mg, Oral, 3 times daily PRN    sacubitril-valsartan (Entresto) 49-51 MG TABS 1  tablet, 2 times daily    semaglutide (1 mg/dose) (OZEMPIC (1 MG/DOSE)) 1 mg, Weekly    sildenafil (VIAGRA) 25 mg, Oral, Daily PRN    spironolactone (ALDACTONE) 25 mg, Daily    vitamin B-12 (VITAMIN B-12) 500 mcg, Daily      Past Medical History:   Diagnosis Date    CHF (congestive heart failure) (HCC)     DM (diabetes mellitus) (HCC)     HTN (hypertension)      Past Surgical History:   Procedure Laterality Date    CARDIAC SURGERY       No family history on file.  Social History     Socioeconomic History    Marital status: /Civil Union     Spouse name: Not on file    Number of children: Not on file    Years of education: Not on file    Highest education level: Not on file   Occupational History    Not on file   Tobacco Use    Smoking status: Never    Smokeless tobacco: Current     Types: Chew   Substance and Sexual Activity    Alcohol use: Not Currently    Drug use: Never    Sexual activity: Not on file   Other Topics Concern    Not on file   Social History Narrative    Not on file     Social Drivers of Health     Financial Resource Strain: Not on file   Food Insecurity: No Food Insecurity (5/10/2025)    Nursing - Inadequate Food Risk Classification     Worried About Running Out of Food in the Last Year: Never true     Ran Out of Food in the Last Year: Never true     Ran Out of Food in the Last Year: Never true   Transportation Needs: No Transportation Needs (5/10/2025)    Nursing - Transportation Risk Classification     Lack of Transportation: Not on file     Lack of Transportation: No   Physical Activity: Not on file   Stress: Not on file   Social Connections: Not on file   Intimate Partner Violence: Unknown (5/10/2025)    Nursing IPS     Feels Physically and Emotionally Safe: Not on file     Physically Hurt by Someone: Not on file     Humiliated or Emotionally Abused by Someone: Not on file     Physically Hurt by Someone: No     Hurt or Threatened by Someone: No   Housing Stability: Unknown (5/10/2025)     Nursing: Inadequate Housing Risk Classification     Has Housing: Not on file     Worried About Losing Housing: Not on file     Unable to Get Utilities: Not on file     Unable to Pay for Housing in the Last Year: No     Has Housin        Review of Systems     Objective     Physical Exam           Imaging:  CT ABDOMEN AND PELVIS WITHOUT IV CONTRAST     INDICATION: ap fp.     COMPARISON: Outside CT abdomen pelvis with contrast report 2023. Outside CT urogram report 3/21/2024. Outside CT abdomen pelvis without contrast report 2023. These are available in [facility] Everywhere. The comparison images are not currently   available.     TECHNIQUE: CT examination of the abdomen and pelvis was performed without intravenous contrast. Multiplanar 2D reformatted images were created from the source data.     This examination, like all CT scans performed in the [facility], was performed utilizing techniques to minimize radiation dose exposure, including the use of iterative reconstruction and automated exposure control. Radiation dose length   product (DLP) for this visit: 654 mGy-cm     Enteric Contrast: Not administered.     FINDINGS:     ABDOMEN     LOWER CHEST: No clinically significant abnormality in the visualized lower chest.     LIVER/BILIARY TREE: Unremarkable.     GALLBLADDER: No calcified gallstones. No pericholecystic inflammatory change.     SPLEEN: Unremarkable.     PANCREAS: Unremarkable.     ADRENAL GLANDS: Unremarkable.     KIDNEYS/URETERS: Numerous high density predominantly subcentimeter bilateral renal cortical lesions in keeping with hemorrhagic cyst.     High density contents of the left renal collecting system and proximal left ureter indicates hematuria. No hydronephrosis. No perinephric collection.     STOMACH AND BOWEL: Colonic diverticulosis without findings of acute diverticulitis.     APPENDIX: Noninflamed.     ABDOMINOPELVIC CAVITY: No ascites. No pneumoperitoneum. No  lymphadenopathy.     VESSELS: No abdominal aortic aneurysm. Atherosclerotic calcifications of the aorta and branches including the renal arteries.     PELVIS     REPRODUCTIVE ORGANS: Prostamegaly indenting the bladder base.     URINARY BLADDER: Distended without apparent wall thickening.     ABDOMINAL WALL/INGUINAL REGIONS: Small uncomplicated bilateral fat-containing inguinal hernias.     BONES: No acute fracture or suspicious osseous lesion.     IMPRESSION:     High density contents of the left renal collecting system and proximal left ureter indicate hematuria.     Numerous predominately subcentimeter bilateral renal cortical lesions likely to represent hemorrhagic cysts. These were described on prior outside CTs.     Prostamegaly protruding into the bladder base with prominent bladder distention. No bladder wall thickening.     Findings are consistent with the preliminary report from [facility] which was provided shortly after completion of the exam.        Narrative & Impression   CT ABDOMEN AND PELVIS WITH AND WITHOUT IV CONTRAST     INDICATION: hematuria. Patient presents with 60 history of hematuria, admitted on 7/13/2024.     COMPARISON: CT abdomen pelvis 7/13/2024.     TECHNIQUE: Initial CT of the abdomen and pelvis was performed without intravenous contrast. Subsequent dynamic CT evaluation of the abdomen and pelvis was performed after the administration of contrast in both nephrographic and delayed phases.    Multiplanar 2D reformatted images were created from the source data.     This examination, like all CT scans performed in the [facility], was performed utilizing techniques to minimize radiation dose exposure, including the use of iterative reconstruction and automated exposure control. Radiation dose length   product (DLP) for this visit: 2313.61 mGy-cm     IV Contrast: 100 mL of iohexol (OMNIPAQUE)  Enteric Contrast: Not administered.     FINDINGS:     ABDOMEN     RIGHT  KIDNEY AND URETER:  No solid renal mass or detectable urothelial mass. Multiple subcentimeter hypo and hyperattenuating lesions consistent with simple and hemorrhagic cysts.  No hydronephrosis or hydroureter.  No urinary tract calculi.  No perinephric collection.     LEFT KIDNEY AND URETER: Simple renal cysts. Additional subcentimeter hypoattenuating lesions, too small to characterize, but statistically likely.     Similar-appearing high density contents within the left renal collecting system and proximal left ureter with associated mild hydronephrosis and mildly delayed nephrogram (3/58-76). None to minimal minimal contrast excretion on the delayed phase.  No urinary tract calculi.  No perinephric collection.     URINARY BLADDER:  Bladder is collapsed around a urinary catheter.  Air within the bladder lumen in keeping with recent instrumentation. Otherwise grossly unremarkable.  No calculi.        LOWER CHEST: Bibasilar atelectasis.     LIVER/BILIARY TREE: Subcentimeter hypoattenuating lesion in the right hepatic lobe, too small to characterize but statistically likely benign, which do not require follow-up (ACR White Paper 2017). No suspicious mass. Normal hepatic contours. No biliary   dilation.     GALLBLADDER: No calcified gallstones. No pericholecystic inflammatory change.     SPLEEN: Splenule noted adjacent to the pancreatic tail.     PANCREAS: Unremarkable.     ADRENAL GLANDS: Unremarkable.     STOMACH AND BOWEL: Colonic diverticulosis without findings of acute diverticulitis.     APPENDIX: Normal.     ABDOMINOPELVIC CAVITY: No ascites. No pneumoperitoneum. No lymphadenopathy.     VESSELS: Atherosclerosis without abdominal aortic aneurysm.     PELVIS     REPRODUCTIVE ORGANS: Enlarged prostate.     ABDOMINAL WALL/INGUINAL REGIONS: Small bilateral fat-containing inguinal hernias.     BONES: No acute fracture or suspicious osseous lesion.     IMPRESSION:        Persistent distention of the left renal pelvis  and proximal ureter by high density material, which is suspected to result in a degree of obstruction given delayed nephrogram and absence of contrast excretion. While this is likely at least in part due to   blood products, cannot exclude the presence of an upper tract urothelial neoplasm.        The study was marked in EPIC for significant notification.                 Resident: GREGG Merlos          CT ABDOMEN AND PELVIS WITHOUT IV CONTRAST - LOW DOSE RENAL STONE     INDICATION: hematuria, dysuria.     COMPARISON: CT abdomen/pelvis 7/15/2024.     TECHNIQUE: Low radiation dose thin section CT examination of the abdomen and pelvis was performed without intravenous or oral contrast according to a protocol specifically designed to evaluate for urinary tract calculus. Axial, sagittal, and coronal 2D   reformatted images were created from the source data and submitted for interpretation. Evaluation for pathology in the abdomen and pelvis that is unrelated to urinary tract calculi is limited.     Radiation dose length product (DLP) for this visit: 467 mGy-cm. . This examination, like all CT scans performed in the [facility], was performed utilizing techniques to minimize radiation dose exposure, including the use of iterative   reconstruction and automated exposure control.     URINARY TRACT FINDINGS:     RIGHT KIDNEY AND URETER: No urinary tract calculi. No hydronephrosis or hydroureter. Several subcentimeter hyperdensities, likely hemorrhagic/proteinaceous cysts.     LEFT KIDNEY AND URETER: Stable appearance left hydronephrosis containing high density material.     URINARY BLADDER: Large dependent lobulated hyperdensity in the bladder.        ADDITIONAL FINDINGS:     LOWER CHEST: No clinically significant abnormality in the visualized lower chest.     SOLID VISCERA: Limited low radiation dose noncontrast CT evaluation demonstrates no clinically significant abnormality of the imaged portions of the liver,  spleen, pancreas, or adrenal glands.     GALLBLADDER/BILIARY TREE: No calcified gallstones. No pericholecystic inflammatory change. No biliary dilation.     STOMACH AND BOWEL: Colonic diverticulosis without findings of acute diverticulitis. Tiny dependent hyperdense focus within the second portion of the duodenum and a left mid abdominal small bowel loop.     APPENDIX: No findings to suggest appendicitis.     ABDOMINOPELVIC CAVITY: No ascites. No pneumoperitoneum. No lymphadenopathy.     VESSELS: Atherosclerosis without abdominal aortic aneurysm.     REPRODUCTIVE ORGANS: Enlarged prostate.     ABDOMINAL WALL/INGUINAL REGIONS: Small bilateral fat-containing inguinal hernias.     BONES: No acute fracture or suspicious osseous lesion.     IMPRESSION:     1.  Large dependent lobulated hyperdensity in the urinary bladder, likely blood clot, although neoplasm is not excluded on this noncontrast study.  2.  Stable appearance of left hydronephrosis containing high density material, which also may represent blood products, although neoplasm again not excluded. Urology consultation is recommended.  3.  Tiny dependent hyperdense foci within the second portion of the duodenum and a left mid abdominal small bowel loop, which may represent ingested material. Correlate with oral intake.                  Labs:  Lab Results   Component Value Date    SODIUM 137 05/10/2025    K 4.1 05/10/2025     05/10/2025    CO2 24 05/10/2025    BUN 19 05/10/2025    CREATININE 0.97 05/10/2025    GLUC 140 05/10/2025    CALCIUM 8.2 (L) 05/10/2025         Lab Results   Component Value Date    WBC 11.08 (H) 05/11/2025    HGB 10.1 (L) 05/11/2025    HCT 30.1 (L) 05/11/2025    MCV 90 05/11/2025     05/11/2025         VTE Pharmacologic Prophylaxis: Reason for no pharmacologic prophylaxis gross hematuria,  VTE Mechanical Prophylaxis: sequential compression device     Roberta Mahoney PA-C

## 2025-05-11 NOTE — PROGRESS NOTES
Progress Note - Internal Medicine   Name: Jw Ray 59 y.o. male I MRN: [id number]  Unit/Bed#: OR POOL I Date of Admission: 5/10/2025   Date of Service: 5/11/2025 I Hospital Day: 1    Assessment & Plan  Gross hematuria  Hx of intermittent hematuria s/p CT urogram and cystoscopy (3/2024) with no identified source.   Initially presented to [facility] on 5/9 for gross hematuria and decreased UOP. Three-way Green placed and patient started on CBI. Transferred to [facility] for urology evaluation given acute blood loss anemia.     CT (5/9) Large dependent lobulated hyperdensity in the urinary bladder, likely blood clot, although neoplasm is not excluded. Stable appearance of left hydronephrosis containing high density material, likely representing blood products, although neoplasm again not excluded.     Plan:  Urology consulted, appreciate recommendations  Plan for cystoscopy clot evacuation possible fulguration and biopsy today  Monitor hemoglobin  Continue oxybutynin 5mg daily  Continue finasteride 5mg daily  Hydronephrosis of left kidney  CT (5/9) Stable appearance of left hydronephrosis containing high density material, likely representing blood products, although neoplasm again not excluded.   See plan as above  ABLA (acute blood loss anemia)  Noted to have Hgb drop > 2 from admission (Initial Hgb 13.3 > 10.8)  In setting of gross hematuria    Plan:  Urological evaluation as above  Hold ASA   Monitor hemoglobin  Transfuse if Hgb < 7  Chronic combined systolic and diastolic heart failure (HCC)  Wt Readings from Last 3 Encounters:   05/10/25 86.1 kg (189 lb 13.1 oz)   05/09/25 86.1 kg (189 lb 13.1 oz)   07/13/24 84.1 kg (185 lb 6.5 oz)     Last Echo 2021 or 2022 with LVEF of 45%  Home GDMT: Coreg 25 mg BID, Aldactone 25 mg daily, Entresto 49-51 mg daily, Jardiance 25 mg daily    Plan:  Continue home Coreg, Aldactone, Entresto, Jardiance  I/O, daily weights  Essential hypertension  Continue home Coreg,  Aldactone, Entresto  Type 2 diabetes mellitus (HCC)  Lab Results   Component Value Date    HGBA1C 8 (H) 04/28/2025       Recent Labs     05/10/25  1114 05/10/25  1601 05/10/25  2045 05/11/25  0546   POCGLU 129 189* 189* 136       Blood Sugar Average: Last 72 hrs:  (P) 162.5  Patient on metformin 1000 mg BID, glyburide 2.5 mg daily, Jardiance 25 mg daily, Semaglutide weekly    Plan:  [facility]  Hypoglycemia protocol  Monitor glucose. Goal -180 while inpatient  CAD (coronary artery disease)  CAD s/p PCI in 2010 with BALAJI to OM 3. Maintained on ASA outpatient.   Lipid panel 4/28/2025: 154/188/40/114    Plan:  Hold ASA isogross hematuria and acute blood loss anemia   Continue Atorvastatin 40 mg daily    Disposition: Pending  cystoscopy and clot evaluation with urology    Team: SOD TEAM C    Subjective   Patient seen and examined. No acute events overnight.  No acute complaints this morning    Objective :  Temp:  [97.5 °F (36.4 °C)-98.5 °F (36.9 °C)] 98.1 °F (36.7 °C)  HR:  [] 106  BP: ()/(55-79) 120/79  Resp:  [16-20] 17  SpO2:  [93 %-96 %] 96 %  O2 Device: None (Room air)    I/O         05/09 0701  05/10 0700 05/10 0701  05/11 0700 05/11 0701  05/12 0700    Urine (mL/kg/hr)  4195 1700 (12.3)    Total Output  4195 1700    Net  -4195 -1700                 Weights:   IBW (Ideal Body Weight): 66.1 kg    Body mass index is 29.73 kg/m².  Weight (last 2 days)       Date/Time Weight    05/10/25 20:41:35 86.1 (189.82)            Physical Exam  Vitals reviewed.   Constitutional:       General: He is not in acute distress.     Appearance: Normal appearance. He is not toxic-appearing.   HENT:      Head: Normocephalic and atraumatic.   Eyes:      Conjunctiva/sclera: Conjunctivae normal.   Cardiovascular:      Rate and Rhythm: Normal rate and regular rhythm.      Pulses: Normal pulses.      Heart sounds: Normal heart sounds. No murmur heard.     No gallop.   Pulmonary:      Effort: Pulmonary effort is normal. No  respiratory distress.      Breath sounds: Normal breath sounds.   Abdominal:      General: Abdomen is flat. There is no distension.      Palpations: Abdomen is soft.      Tenderness: There is no abdominal tenderness.   Genitourinary:     Comments: Green in place draining light red urine  Musculoskeletal:      Right lower leg: No edema.      Left lower leg: No edema.   Skin:     General: Skin is warm and dry.   Neurological:      General: No focal deficit present.      Mental Status: He is alert. Mental status is at baseline.   Psychiatric:         Mood and Affect: Mood normal.         Behavior: Behavior normal.         Thought Content: Thought content normal.           Lab Results: I have reviewed the following results:  Recent Labs     05/09/25  1113 05/09/25  1616 05/10/25  0546 05/10/25  1605 05/11/25  0512 05/11/25  0756   WBC 10.20*  --  11.51*  --  11.08*  --    HGB 13.3   < > 10.8*   < > 10.1* 10.5*   HCT 40.5   < > 32.9*   < > 30.1* 31.7*     --  166  --  160  --    SODIUM 136  --  137  --  138  --    K 4.5  --  4.1  --  3.8  --      --  105  --  107  --    CO2 23  --  24  --  24  --    BUN 17  --  19  --  21  --    CREATININE 0.94  --  0.97  --  0.84  --    GLUC 190*  --  140  --  155*  --    AST 12*  --  9*  --   --   --    ALT 11  --  8  --   --   --    ALB 4.6  --  3.7  --   --   --    TBILI 0.60  --  0.52  --   --   --    ALKPHOS 41  --  34  --   --   --    PTT 26  --   --   --   --   --    INR 0.99  --   --   --   --   --     < > = values in this interval not displayed.             Currently Ordered Meds:   Current Facility-Administered Medications:     [Transfer Hold] acetaminophen (TYLENOL) tablet 650 mg, Q6H PRN    [Transfer Hold] atorvastatin (LIPITOR) tablet 40 mg, Daily    [Transfer Hold] carvedilol (COREG) tablet 25 mg, BID With Meals    [Transfer Hold] finasteride (PROSCAR) tablet 5 mg, Daily    [Transfer Hold] gabapentin (NEURONTIN) capsule 100 mg, Daily    [Transfer Hold] insulin  lispro (HumALOG/ADMELOG) 100 units/mL subcutaneous injection 1-6 Units, TID AC **AND** Fingerstick Glucose (POCT), TID AC    [Transfer Hold] insulin lispro (HumALOG/ADMELOG) 100 units/mL subcutaneous injection 1-6 Units, HS    [Transfer Hold] nicotine (NICODERM CQ) 14 mg/24hr TD 24 hr patch 1 patch, Daily    [Transfer Hold] ondansetron (ZOFRAN) injection 4 mg, Q6H PRN    [Transfer Hold] oxybutynin (DITROPAN) tablet 5 mg, TID    [Transfer Hold] oxyCODONE (ROXICODONE) IR tablet 5 mg, Q4H PRN    [Transfer Hold] sacubitril-valsartan (ENTRESTO) 49-51 MG per tablet 1 tablet, BID  VTE Pharmacologic Prophylaxis: VTE covered by:    None     VTE Mechanical Prophylaxis: sequential compression device    Administrative Statements     Portions of the record may have been created with voice recognition software.

## 2025-05-11 NOTE — OP NOTE
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  Left ureteroscopy with biopsy  Left retrograde pyelogram  Left ureteral stent placement  Right retrograde pyelogram fulguration    Specimen(s):  ID Type Source Tests Collected by Time Destination   1 : Left Collecting System Tissue Ureter, Left NON-GYNECOLOGIC CYTOLOGY Marbin Santacruz MD 2025 1048    2 : Right Urine Urine Ureter, Right NON-GYNECOLOGIC CYTOLOGY Marbin Santacruz MD 2025 1049    3 : Left Collecting System Tissue Ureter, Left TISSUE EXAM Marbin Santacruz MD 2025 1052        Estimated Blood Loss:   Minimal    Drains:  Urethral Catheter Three way 24 Fr. (Active)   Irrigant Normal saline 25 0940   Urethral Irrigation Intake (mL) 5000 mL 25 0940   Output (mL) 4500 mL 25 0940   Number of days: 1       Anesthesia Type:   General    Operative Indications:  Patient with persistent hematuria with CT scan suggesting density within the left collecting system consistent with blood product and also moderate size clot burden..  Patient previously had episode of gross hematuria in  with CT scan at the time also showing abnormality with the left collecting system.  Patient's hemoglobin is slowly drifting down and he had ongoing hematuria.  Therefore elected for cystoscopy clot evacuation and left uteroscopy.    Operative Findings:  Moderate clot burden evacuated from the bladder.  No lesions seen within the bladder.  Left retrograde pyelogram with poor filling of the left upper pole.  Wimdu Aptra 1 time digital flexible ureteroscope used for  visualization given patient's complicated clinical picture with hematuria  Left ureteroscopy showing large volume of tumor over taking what appeared to be the left upper pole of the collecting system.  Multiple biopsies performed with biopsy forceps and basket.  The ureter appeared uninvolved with cancer  Left stent placed  Right retrograde pyelogram with new catheter unremarkable.  Urine cytology also collected.  24 Kazakh three-way hematuria catheter placed.      Complications:   None    Procedure and Technique:    After informed consent including the risks of bleeding, infection, ureteral injury, and need for secondary procedures, patient was placed supine in the operating room theater.  Gen. anesthesia was administered.      They were then placed in the dorsal lithotomy position and sterilely prepped and draped in usual fashion. Antibiotic prophylaxis and DVT prophylaxis were administered Cystoscopy was performed the 21 Kazakh cystoscope 30° lens.  This revealed a normal urethra and prostate.  Inspection of the bladder showed a clot burden limiting vision.  Ellik was used to evacuate clots.  A moderate burden of clots was evacuated.  The bladder was then carefully examined with a 30 and 70 degree lens.  No lesions were noted within the bladder.  There is mild erythema consistent with catheter cystitis along the back wall.  There was active blood clots seen emanating from the left ureteral orifice.    Attention was turned to the left ureteral orifice. A 5fr open ended catheter was attempted to be passed into the ureteral orifice. A retrograde ureteropyelogram was performed showing normal-appearing ureter and lower pole of collecting system with poor filling of the upper pole of the collecting system.. Then a 0.38 solo guidewire was passed through the open ended catheter and up the ureter into the renal pelvis. The scope was removed and reintroduced and a second solo wire was passed up the ureter into the renal  pelvis with the aid of a 2 wire introducer.    An 10/12 x 35 cm access sheath was then sequentially placed (inner sheath and then inner + outer sheath) over one of the wires under fluoroscopic guidance with minimal resistance.    An Aptra one time use flexible ureteroscope was then passed through the access sheath.  We utilized water for pressure to help with visualization secondary to patient's known hematuria.  The collecting system was entered and explored.  There was immediately an obvious large volume of tumor over taking what appeared to be upper pole of the collecting system but hard to determine because of the tumor itself as well as hematuria.  We were able to enter interpolar and lower pole calyces which appeared unremarkable.  Upper pole calyceal was able to be entered by pushing around the tumor.  Retrograde performed showed filling defect in the upper pole consistent with known tumor.  The tumor was biopsied multiple times using biopsy forceps as well as a [employer] basket.  Biopsies were sent for cytology as well as pathology.     The ureteroscope was backed down the ureter under vision and there no signs of urothelial cancer in the ureter itself.    I debated whether to leave a stent and in the end elected to do so to potentially promote drainage as the patient has had pain issues in the left flank.  A 6 x 26 double-J stent inserted without difficulty with good coil seen in the upper pole of the left collecting system on fluoroscopy and visually in the bladder.  The string was not left on. The bladder was drained.       We now turned our attention to the right side utilizing a new 5 Lao open-ended catheter the right ureter was intubated and a saline barbotage was performed and urine collected for cytology.  Retrogrades were then performed showing unremarkable right collecting system and ureter without signs of filling defect.    A new 24 Lao three-way Green catheter was placed in the bladder.   CBI was started.  Urine was clear with CBI.    The patient was placed back supine, awakened from general anesthesia and brought to recovery room in stable condition.     A qualified resident physician was not available.    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  Will discuss the role for nephroureterectomy upfront given his bleeding issues versus neoadjuvant chemotherapy followed by surgery.  Will also obtain staging imaging as has has a CT scan without contrast during this admission           SIGNATURE: Marbin Santacruz MD  DATE: May 11, 2025  TIME: 10:58 AM

## 2025-05-11 NOTE — ASSESSMENT & PLAN NOTE
CAD s/p PCI in 2010 with BALAJI to OM 3. Maintained on ASA outpatient.   Lipid panel 4/28/2025: 154/188/40/114    Plan:  Hold ASA isogross hematuria and acute blood loss anemia   Continue Atorvastatin 40 mg daily

## 2025-05-11 NOTE — ASSESSMENT & PLAN NOTE
CT (5/9) Stable appearance of left hydronephrosis containing high density material, likely representing blood products, although neoplasm again not excluded.   See plan as above

## 2025-05-11 NOTE — ASSESSMENT & PLAN NOTE
Lab Results   Component Value Date    HGBA1C 8 (H) 04/28/2025       Recent Labs     05/10/25  1114 05/10/25  1601 05/10/25  2045 05/11/25  0546   POCGLU 129 189* 189* 136       Blood Sugar Average: Last 72 hrs:  (P) 162.5  Patient on metformin 1000 mg BID, glyburide 2.5 mg daily, Jardiance 25 mg daily, Semaglutide weekly    Plan:  [facility]  Hypoglycemia protocol  Monitor glucose. Goal -180 while inpatient

## 2025-05-11 NOTE — CASE MANAGEMENT
Case Management Assessment & Discharge Planning Note    Patient name Jw Ray  Location /-01 MRN [id number]  : 1965 Date 2025       Current Admission Date: 5/10/2025  Current Admission Diagnosis:Gross hematuria   Patient Active Problem List    Diagnosis Date Noted Date Diagnosed    ABLA (acute blood loss anemia) 05/10/2025     Hydronephrosis of left kidney 2025     Benign prostatic hyperplasia with urinary retention 2024     Gross hematuria 2023     Pure hypercholesterolemia 2023     Chronic combined systolic and diastolic heart failure (HCC) 2022     Vitamin D deficiency 2020     Presence of stent in coronary artery 2020     Ischemic cardiomyopathy 10/29/2018     CAD (coronary artery disease) 2013     Essential hypertension 2013     Type 2 diabetes mellitus (HCC) 2011     Long term current use of anticoagulant therapy 12/15/2010       LOS (days): 1  Geometric Mean LOS (GMLOS) (days):   Days to GMLOS:     OBJECTIVE:    Risk of Unplanned Readmission Score: 17.24         Current admission status: Inpatient       Preferred Pharmacy:   [facility] [address]  [facility] [address]  Phone: [phone] Fax: [phone]    [facility] [address] [place] [address] [address] [facility]  [address] [facility] [address]  Phone: [phone] Fax: [phone]    Primary Care Provider: Thanh Echeverria MD    Primary Insurance:   Secondary Insurance:     ASSESSMENT:  Active Health Care Proxies       ColbyolindaPriscilla Health Care Representative - Spouse   Primary Phone: [phone] (Mobile)                 Patient Information  Admitted from:: Home  Mental Status: Alert  During Assessment patient was accompanied by: Not accompanied during assessment  Assessment information provided by:: Patient  Primary Caregiver: Self  Support Systems: Self,  Spouse/significant other, Family members  County of Residence: [facility]  What [facility] do you live in?: [place]  Home entry access options. Select all that apply.: Stairs  Number of steps to enter home.: 1  Living Arrangements: Lives w/ Spouse/significant other  Is patient a ?: No    Activities of Daily Living Prior to Admission  Functional Status: Independent  Completes ADLs independently?: Yes  Ambulates independently?: Yes  Does patient use assisted devices?: No  Does patient currently own DME?: No  Does patient have a history of Outpatient Therapy (PT/OT)?: No  Does the patient have a history of Short-Term Rehab?: No  Does patient have a history of HHC?: No  Does patient currently have HHC?: No    Patient Information Continued  Income Source: Pension/detention  Does patient have prescription coverage?: Yes  Can the patient afford their medications and any related supplies (such as glucometers or test strips)?: Yes  Does patient receive dialysis treatments?: No  Does patient have a history of substance abuse?: No  Does patient have a history of Mental Health Diagnosis?: No    Means of Transportation  Means of Transport to Appts:: Drives Self      DISCHARGE DETAILS:    Discharge planning discussed with:: Patient  Freedom of Choice: Yes  Comments - Freedom of Choice: Discussed FOC  CM contacted family/caregiver?: No- see comments (declined)       Other Referral/Resources/Interventions Provided:  Referral Comments: This CM introduced self and role to patient.  Lives with spouse in private residence, independent with ADLS, drives self, employed.  States he has medical insurance through spouse's employer.  No DME at home.  no history of OP, STR, or HH noted

## 2025-05-12 PROBLEM — C68.9 UROTHELIAL CANCER (HCC): Status: ACTIVE | Noted: 2023-07-01

## 2025-05-12 LAB
ANION GAP SERPL CALCULATED.3IONS-SCNC: 7 MMOL/L (ref 4–13)
BUN SERPL-MCNC: 24 MG/DL (ref 5–25)
CALCIUM SERPL-MCNC: 8.2 MG/DL (ref 8.4–10.2)
CHLORIDE SERPL-SCNC: 103 MMOL/L (ref 96–108)
CO2 SERPL-SCNC: 25 MMOL/L (ref 21–32)
CREAT SERPL-MCNC: 1.11 MG/DL (ref 0.6–1.3)
GFR SERPL CREATININE-BSD FRML MDRD: 72 ML/MIN/1.73SQ M
GLUCOSE SERPL-MCNC: 165 MG/DL (ref 65–140)
GLUCOSE SERPL-MCNC: 172 MG/DL (ref 65–140)
GLUCOSE SERPL-MCNC: 174 MG/DL (ref 65–140)
GLUCOSE SERPL-MCNC: 177 MG/DL (ref 65–140)
GLUCOSE SERPL-MCNC: 203 MG/DL (ref 65–140)
HCT VFR BLD AUTO: 28 % (ref 36.5–49.3)
HCT VFR BLD AUTO: 28.1 % (ref 36.5–49.3)
HCT VFR BLD AUTO: 29.2 % (ref 36.5–49.3)
HGB BLD-MCNC: 9.3 G/DL (ref 12–17)
HGB BLD-MCNC: 9.5 G/DL (ref 12–17)
HGB BLD-MCNC: 9.5 G/DL (ref 12–17)
POTASSIUM SERPL-SCNC: 4.2 MMOL/L (ref 3.5–5.3)
SODIUM SERPL-SCNC: 135 MMOL/L (ref 135–147)

## 2025-05-12 PROCEDURE — 99232 SBSQ HOSP IP/OBS MODERATE 35: CPT | Performed by: PHYSICIAN ASSISTANT

## 2025-05-12 PROCEDURE — 85014 HEMATOCRIT: CPT | Performed by: UROLOGY

## 2025-05-12 PROCEDURE — 99232 SBSQ HOSP IP/OBS MODERATE 35: CPT | Performed by: INTERNAL MEDICINE

## 2025-05-12 PROCEDURE — 82948 REAGENT STRIP/BLOOD GLUCOSE: CPT

## 2025-05-12 PROCEDURE — 99223 1ST HOSP IP/OBS HIGH 75: CPT | Performed by: INTERNAL MEDICINE

## 2025-05-12 PROCEDURE — 85018 HEMOGLOBIN: CPT | Performed by: UROLOGY

## 2025-05-12 PROCEDURE — 80048 BASIC METABOLIC PNL TOTAL CA: CPT | Performed by: UROLOGY

## 2025-05-12 RX ORDER — DOCUSATE SODIUM 100 MG/1
100 CAPSULE, LIQUID FILLED ORAL 2 TIMES DAILY
Status: DISCONTINUED | OUTPATIENT
Start: 2025-05-12 | End: 2025-05-13 | Stop reason: HOSPADM

## 2025-05-12 RX ORDER — PHENAZOPYRIDINE HYDROCHLORIDE 100 MG/1
200 TABLET, FILM COATED ORAL 3 TIMES DAILY PRN
Status: DISCONTINUED | OUTPATIENT
Start: 2025-05-12 | End: 2025-05-13 | Stop reason: HOSPADM

## 2025-05-12 RX ORDER — POLYETHYLENE GLYCOL 3350 17 G/17G
17 POWDER, FOR SOLUTION ORAL DAILY
Status: DISCONTINUED | OUTPATIENT
Start: 2025-05-12 | End: 2025-05-13 | Stop reason: HOSPADM

## 2025-05-12 RX ORDER — PHENAZOPYRIDINE HYDROCHLORIDE 100 MG/1
100 TABLET, FILM COATED ORAL 3 TIMES DAILY PRN
Status: DISCONTINUED | OUTPATIENT
Start: 2025-05-12 | End: 2025-05-12

## 2025-05-12 RX ORDER — SODIUM CHLORIDE, SODIUM GLUCONATE, SODIUM ACETATE, POTASSIUM CHLORIDE, MAGNESIUM CHLORIDE, SODIUM PHOSPHATE, DIBASIC, AND POTASSIUM PHOSPHATE .53; .5; .37; .037; .03; .012; .00082 G/100ML; G/100ML; G/100ML; G/100ML; G/100ML; G/100ML; G/100ML
500 INJECTION, SOLUTION INTRAVENOUS ONCE
Status: COMPLETED | OUTPATIENT
Start: 2025-05-12 | End: 2025-05-12

## 2025-05-12 RX ADMIN — DOCUSATE SODIUM 100 MG: 100 CAPSULE, LIQUID FILLED ORAL at 10:40

## 2025-05-12 RX ADMIN — OXYCODONE HYDROCHLORIDE 5 MG: 5 TABLET ORAL at 08:24

## 2025-05-12 RX ADMIN — INSULIN LISPRO 1 UNITS: 100 INJECTION, SOLUTION INTRAVENOUS; SUBCUTANEOUS at 21:08

## 2025-05-12 RX ADMIN — INSULIN LISPRO 2 UNITS: 100 INJECTION, SOLUTION INTRAVENOUS; SUBCUTANEOUS at 17:18

## 2025-05-12 RX ADMIN — SODIUM CHLORIDE, SODIUM GLUCONATE, SODIUM ACETATE, POTASSIUM CHLORIDE, MAGNESIUM CHLORIDE, SODIUM PHOSPHATE, DIBASIC, AND POTASSIUM PHOSPHATE 500 ML: .53; .5; .37; .037; .03; .012; .00082 INJECTION, SOLUTION INTRAVENOUS at 11:49

## 2025-05-12 RX ADMIN — OXYCODONE HYDROCHLORIDE 5 MG: 5 TABLET ORAL at 02:04

## 2025-05-12 RX ADMIN — PHENAZOPYRIDINE HYDROCHLORIDE 200 MG: 100 TABLET ORAL at 17:16

## 2025-05-12 RX ADMIN — GABAPENTIN 100 MG: 100 CAPSULE ORAL at 08:24

## 2025-05-12 RX ADMIN — OXYBUTYNIN CHLORIDE 5 MG: 5 TABLET ORAL at 08:24

## 2025-05-12 RX ADMIN — NICOTINE 1 PATCH: 14 PATCH, EXTENDED RELEASE TRANSDERMAL at 08:21

## 2025-05-12 RX ADMIN — OXYCODONE HYDROCHLORIDE 5 MG: 5 TABLET ORAL at 13:10

## 2025-05-12 RX ADMIN — OXYBUTYNIN CHLORIDE 5 MG: 5 TABLET ORAL at 17:16

## 2025-05-12 RX ADMIN — OXYBUTYNIN CHLORIDE 5 MG: 5 TABLET ORAL at 21:04

## 2025-05-12 RX ADMIN — OXYCODONE HYDROCHLORIDE 5 MG: 5 TABLET ORAL at 22:32

## 2025-05-12 RX ADMIN — SACUBITRIL AND VALSARTAN 1 TABLET: 49; 51 TABLET, FILM COATED ORAL at 17:17

## 2025-05-12 RX ADMIN — DOCUSATE SODIUM 100 MG: 100 CAPSULE, LIQUID FILLED ORAL at 17:23

## 2025-05-12 RX ADMIN — PHENAZOPYRIDINE HYDROCHLORIDE 200 MG: 100 TABLET ORAL at 02:18

## 2025-05-12 RX ADMIN — INSULIN LISPRO 1 UNITS: 100 INJECTION, SOLUTION INTRAVENOUS; SUBCUTANEOUS at 11:29

## 2025-05-12 RX ADMIN — INSULIN LISPRO 1 UNITS: 100 INJECTION, SOLUTION INTRAVENOUS; SUBCUTANEOUS at 08:23

## 2025-05-12 RX ADMIN — SACUBITRIL AND VALSARTAN 1 TABLET: 49; 51 TABLET, FILM COATED ORAL at 08:25

## 2025-05-12 RX ADMIN — FINASTERIDE 5 MG: 5 TABLET, FILM COATED ORAL at 08:24

## 2025-05-12 RX ADMIN — ATORVASTATIN CALCIUM 40 MG: 40 TABLET, FILM COATED ORAL at 08:24

## 2025-05-12 RX ADMIN — POLYETHYLENE GLYCOL 3350 17 G: 17 POWDER, FOR SOLUTION ORAL at 10:40

## 2025-05-12 NOTE — CONSULTS
'Consultation - Oncology-Medical   Name: Jw Ray 59 y.o. male I MRN: [id number]  Unit/Bed#: -01 I Date of Admission: 5/10/2025   Date of Service: 5/12/2025 I Hospital Day: 2   Inpatient consult to Oncology  Consult performed by: Mona Roa MD  Consult ordered by: Kwadwo Jacob MD        Physician Requesting Evaluation: Kwadwo Jacob*   Reason for Evaluation / Principal Problem: Upper tract urothelial carcinoma.    Assessment & Plan  Urothelial cancer (HCC)  Presented with hematuria, status post retrograde pyelogram and ureteroscopy on 5/11/2025 that was concerning for large volume tumor burden taking over left upper pole collecting system.  Concerns for high-grade upper tract urothelial cancer.  Final cytology and pathology pending.  CT chest abdomen pelvis unremarkable for any abdominopelvic metastatic disease.  If lesion is biopsy-proven to be high-grade upper tract urothelial cancer, will benefit from neoadjuvant chemotherapy with gemcitabine and cisplatin versus DD MVAC (dose dense methotrexate, will blasting, doxorubicin and cisplatin), however will need clearance from cardiac oncology prior to proceeding with chemotherapy.  Patient strongly wished to proceed with surgery and wants to avoid chemotherapy, primary team made aware of same.  Please do not hesitate to reach out again if patient's changes his mind.    Chronic combined systolic and diastolic heart failure (HCC)  Wt Readings from Last 3 Encounters:   05/10/25 86.1 kg (189 lb 13.1 oz)   05/09/25 86.1 kg (189 lb 13.1 oz)   07/13/24 84.1 kg (185 lb 6.5 oz)     On GDMT with Coreg, spironolactone and Entresto.      Essential hypertension  Management per primary team.  Hydronephrosis of left kidney  Status post stent placement on 5/11/2025.  ABLA (acute blood loss anemia)  Patient's baseline hemoglobin from 5/9/2025 was 13.3, presented with hemoglobin 11.6 that has been gradually trending down, hemoglobin this  morning 9.3.  In setting of hematuria.  Continue to monitor CBC and transfuse if less than 7.      History of Present Illness   Jw Ray is a 59 y.o. male who presents with hematuria.  Patient has past medical history significant for congestive heart failure on Coreg, spironolactone, Entresto, hypertension, diabetes mellitus presented with hematuria and associated blood clots that has worsened over couple of days.  He was evaluated by urology status post cystoscopy, left retrograde pyelogram and left ureteral stent placement on 5/11/2025.  During procedure she was noted to have filling defect of left upper pole during retrograde pyelogram, left ureteroscopy showed large volume of tumor over taking left upper pole of collecting system, multiple biopsies taken pending final results.    Medical oncology is consulted for high-grade large-volume upper tract urothelial cancer to discuss neoadjuvant chemotherapy.  At time of my evaluation patient reports pain in his abdomen and back attributing to recent stent placement.  He has CBI running, had failed clamping trial and continues with hematuria.  Per discussion with patient he is concerned about side effects from chemotherapy in background of his underlying cardiac issues and had stated he would rather die from cancer then to have a longer life with a lot of side effects.  Patient also expressed his wishes to proceed with surgery.      Review of Systems   Constitutional:  Negative for appetite change and fatigue.   Respiratory:  Negative for chest tightness and shortness of breath.    Cardiovascular:  Negative for chest pain and palpitations.   Gastrointestinal:  Positive for abdominal pain and nausea.   Genitourinary:  Positive for hematuria.   Musculoskeletal:  Positive for back pain.   Neurological:  Negative for dizziness and headaches.   All other systems reviewed and are negative.    Historical Information   Historical Information   Past Medical History:    Diagnosis Date    CHF (congestive heart failure) (HCC)     DM (diabetes mellitus) (HCC)     HTN (hypertension)      Past Surgical History:   Procedure Laterality Date    CARDIAC SURGERY      FL RETROGRADE PYELOGRAM  5/11/2025    OK CYSTO W/IRRIG & EVAC MULTPLE OBSTRUCTING CLOTS Bilateral 5/11/2025    Procedure: CYSTOSCOPY EVACUATION OF CLOTS, Left Ureterscopy and biopsy, Left Retrograde and Left Stent. Right Retrograde Pyelogram;  Surgeon: Marbin Santacruz MD;  Location: [facility];  Service: Urology     Social History     Tobacco Use    Smoking status: Never    Smokeless tobacco: Current     Types: Chew   Substance and Sexual Activity    Alcohol use: Not Currently    Drug use: Never    Sexual activity: Not on file     E-Cigarette/Vaping     E-Cigarette/Vaping Substances     No family history on file.  Social History     Tobacco Use    Smoking status: Never    Smokeless tobacco: Current     Types: Chew   Substance and Sexual Activity    Alcohol use: Not Currently    Drug use: Never    Sexual activity: Not on file     Codeine    Oncology History:   Cancer Staging   No matching staging information was found for the patient.    Oncology History    No history exists.     Current Facility-Administered Medications   Medication Dose Route Frequency Provider Last Rate Last Admin    acetaminophen (TYLENOL) tablet 650 mg  650 mg Oral Q6H PRN Marbin Santacruz MD        atorvastatin (LIPITOR) tablet 40 mg  40 mg Oral Daily Marbin Santacruz MD   40 mg at 05/12/25 0824    carvedilol (COREG) tablet 25 mg  25 mg Oral BID With Meals Marbin Santacruz MD   25 mg at 05/12/25 1716    docusate sodium (COLACE) capsule 100 mg  100 mg Oral BID MORAIMA Cortes   100 mg at 05/12/25 1040    finasteride (PROSCAR) tablet 5 mg  5 mg Oral Daily Marbin Santacruz MD   5 mg at 05/12/25 0824    gabapentin (NEURONTIN) capsule 100 mg  100 mg Oral Daily Marbin Santacruz MD   100 mg at 05/12/25 0824    insulin lispro (HumALOG/ADMELOG) 100 units/mL subcutaneous  injection 1-6 Units  1-6 Units Subcutaneous TID AC Marbin Santacruz MD   2 Units at 05/12/25 1718    insulin lispro (HumALOG/ADMELOG) 100 units/mL subcutaneous injection 1-6 Units  1-6 Units Subcutaneous HS Marbin Santacruz MD   3 Units at 05/11/25 2131    nicotine (NICODERM CQ) 14 mg/24hr TD 24 hr patch 1 patch  1 patch Transdermal Daily Marbin Santacruz MD   1 patch at 05/12/25 0821    ondansetron (ZOFRAN) injection 4 mg  4 mg Intravenous Q6H PRN Marbin Santacruz MD        oxybutynin (DITROPAN) tablet 5 mg  5 mg Oral TID Marbin Santacruz MD   5 mg at 05/12/25 1716    oxyCODONE (ROXICODONE) IR tablet 5 mg  5 mg Oral Q4H PRN Marbin Santacruz MD   5 mg at 05/12/25 1310    phenazopyridine (PYRIDIUM) tablet 200 mg  200 mg Oral TID PRN Abhay Mccarthy MD   200 mg at 05/12/25 1716    polyethylene glycol (MIRALAX) packet 17 g  17 g Oral Daily MORAIMA Cortes   17 g at 05/12/25 1040    sacubitril-valsartan (ENTRESTO) 49-51 MG per tablet 1 tablet  1 tablet Oral BID Marbin Santacruz MD   1 tablet at 05/12/25 1717       Objective :  Temp:  [98 °F (36.7 °C)-98.1 °F (36.7 °C)] 98 °F (36.7 °C)  HR:  [] 87  BP: ()/(61-79) 115/79  Resp:  [16-18] 16  SpO2:  [92 %-97 %] 97 %  O2 Device: None (Room air)    Physical Exam  Constitutional:       Appearance: Normal appearance.   HENT:      Head: Normocephalic and atraumatic.      Mouth/Throat:      Mouth: Mucous membranes are moist.      Pharynx: Oropharynx is clear.   Cardiovascular:      Rate and Rhythm: Normal rate and regular rhythm.      Pulses: Normal pulses.      Heart sounds: Normal heart sounds.   Pulmonary:      Effort: Pulmonary effort is normal.      Breath sounds: Normal breath sounds.   Abdominal:      General: Bowel sounds are normal.      Palpations: Abdomen is soft.      Tenderness: There is abdominal tenderness.   Genitourinary:     Comments: Has CBI ongoing with persistent hematuria.  Neurological:      Mental Status: He is alert.           Lab Results: I have  reviewed the following results:  Lab Results   Component Value Date    K 4.2 05/12/2025     05/12/2025    CO2 25 05/12/2025    BUN 24 05/12/2025    CREATININE 1.11 05/12/2025    GLUF 166 (H) 07/14/2024    CALCIUM 8.2 (L) 05/12/2025    AST 9 (L) 05/10/2025    ALT 8 05/10/2025    ALKPHOS 34 05/10/2025    EGFR 72 05/12/2025     Lab Results   Component Value Date    WBC 11.08 (H) 05/11/2025    HGB 9.5 (L) 05/12/2025    HCT 29.2 (L) 05/12/2025    MCV 90 05/11/2025     05/11/2025     Lab Results   Component Value Date    NEUTROABS 7.74 (H) 05/10/2025       Imaging Results Review: I reviewed radiology reports from this admission including: CT chest and CT abdomen/pelvis.  Other Study Results Review: EKG was reviewed.

## 2025-05-12 NOTE — ASSESSMENT & PLAN NOTE
Wt Readings from Last 3 Encounters:   05/10/25 86.1 kg (189 lb 13.1 oz)   05/09/25 86.1 kg (189 lb 13.1 oz)   07/13/24 84.1 kg (185 lb 6.5 oz)     On GDMT with Coreg, spironolactone and Entresto.       no

## 2025-05-12 NOTE — QUICK NOTE
received secure chat message from primary nurse that patient had worsening of hematuria with CBI clamped.  She was unable to pull back irrigant.  Patient was bladder scanned at 39 mL.  Upon my evaluation Green catheter appeared to be draining a dark cherry colored urine.  I was able to manually irrigate with 800 mL of sterile saline with return of 1 small clot.  CBI was resumed.  Discussed with patient likelihood of surgical intervention during this hospitalization pending OR availability.

## 2025-05-12 NOTE — ASSESSMENT & PLAN NOTE
Patient presenting with clot retention, gross hematuria most likely from upper tract  Status post three-way Green catheter insertion CBI not improving transferred to [facility].  Patient now status post cystoscopy clot EVAC fulguration left retrograde pyelogram, ureteroscopy and biopsies with ureteral stent insertion, right-sided retrograde pyelogram.  Placed on CBI postoperatively.  Evaluated this a.m. with tubing clear running at a slow rate.  Will proceed with clamp trial and reevaluate  Serial H&H.  Hemoglobin trending downward from 13.3 on admission trending downward at 9.5 as of yesterday.  Awaiting repeat today.  Transfuse as needed per primary team.    See plan below for left-sided hydronephrosis for further management.

## 2025-05-12 NOTE — ASSESSMENT & PLAN NOTE
Presented with hematuria, status post retrograde pyelogram and ureteroscopy on 5/11/2025 that was concerning for large volume tumor burden taking over left upper pole collecting system.  Concerns for high-grade upper tract urothelial cancer.  Final cytology and pathology pending.  CT chest abdomen pelvis unremarkable for any abdominopelvic metastatic disease.  If lesion is biopsy-proven to be high-grade upper tract urothelial cancer, will benefit from neoadjuvant chemotherapy with gemcitabine and cisplatin versus DD MVAC (dose dense methotrexate, will blasting, doxorubicin and cisplatin), however will need clearance from cardiac oncology prior to proceeding with chemotherapy.  Patient strongly wished to proceed with surgery and wants to avoid chemotherapy, primary team made aware of same.  Please do not hesitate to reach out again if patient's changes his mind.

## 2025-05-12 NOTE — PROGRESS NOTES
Progress Note - Urology   Name: Jw Ray 59 y.o. male I MRN: [id number]  Unit/Bed#: -01 I Date of Admission: 5/10/2025   Date of Service: 5/12/2025 I Hospital Day: 2     Assessment & Plan  Gross hematuria  Patient presenting with clot retention, gross hematuria most likely from upper tract  Status post three-way Green catheter insertion CBI not improving transferred to [facility].  Patient now status post cystoscopy clot EVAC fulguration left retrograde pyelogram, ureteroscopy and biopsies with ureteral stent insertion, right-sided retrograde pyelogram.  Placed on CBI postoperatively.  Evaluated this a.m. with tubing clear running at a slow rate.  Will proceed with clamp trial and reevaluate  Serial H&H.  Hemoglobin trending downward from 13.3 on admission trending downward at 9.5 as of yesterday.  Awaiting repeat today.  Transfuse as needed per primary team.    See plan below for left-sided hydronephrosis for further management.        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  Continue with antispasmodic medication.  Such as oxybutynin may consider Pyridium now since hematuria is improving.  Provide a good bowel regimen.  Flomax BP permitting for stent colic.  Staging imaging ordered.  Medical oncology consulted for further input in regards to new adjunctive chemotherapy followed by surgery versus proceeding with nephro ureterectomy upfront.      Urology service will follow.    Subjective   Patient sitting comfortably in bed in no acute distress.  Reports no issues with catheter overnight draining adequately.  Reports bladder spasms/and sensation that he needs to void constantly not severe compared to prior    Objective :  Temp:  [97.8 °F (36.6 °C)-99.3 °F (37.4 °C)] 98.1  °F (36.7 °C)  HR:  [] 92  BP: ()/(60-81) 129/79  Resp:  [13-22] 18  SpO2:  [89 %-99 %] 95 %  O2 Device: None (Room air)    I/O         05/10 0701 05/11 0700 05/11 0701 05/12 0700    P.O.  118    I.V. (mL/kg)  800 (9.3)    Total Intake(mL/kg)  918 (10.7)    Urine (mL/kg/hr) 4195 18357 (26.6)    Total Output 4195 48937    Net -[phone] -[phone]                Lines/Drains/Airways       Active Status       Name Placement date Placement time Site Days    Urethral Catheter Three way 24 Fr. 05/11/25  1054  Three way  less than 1                  Physical Exam  Constitutional:       General: He is not in acute distress.     Appearance: He is normal weight. He is not ill-appearing, toxic-appearing or diaphoretic.   HENT:      Head: Normocephalic and atraumatic.      Right Ear: External ear normal.      Left Ear: External ear normal.      Nose: Nose normal.      Mouth/Throat:      Pharynx: Oropharynx is clear.   Eyes:      General: No scleral icterus.     Conjunctiva/sclera: Conjunctivae normal.   Cardiovascular:      Rate and Rhythm: Normal rate and regular rhythm.   Pulmonary:      Effort: Pulmonary effort is normal. No respiratory distress.      Breath sounds: No wheezing, rhonchi or rales.   Abdominal:      General: Bowel sounds are normal. There is no distension.      Tenderness: There is no abdominal tenderness.   Genitourinary:     Comments: Urethra Green catheter in place draining clear irrigant fluid and CBI tubing, bag punch in color.  CBI running at a slow rate.  Clamp trial.  Musculoskeletal:      Cervical back: Normal range of motion.   Skin:     General: Skin is warm and dry.   Neurological:      General: No focal deficit present.      Mental Status: He is alert and oriented to person, place, and time.           Lab Results: I have reviewed the following results:  Recent Labs     05/09/25  1113 05/09/25  1616 05/10/25  0546 05/10/25  1605 05/11/25  0512 05/11/25  0756 05/12/25  0035   WBC 10.20*  --   11.51*  --  11.08*  --   --    HGB 13.3   < > 10.8*   < > 10.1*   < > 9.5*   HCT 40.5   < > 32.9*   < > 30.1*   < > 28.1*     --  166  --  160  --   --    SODIUM 136  --  137  --  138  --   --    K 4.5  --  4.1  --  3.8  --   --      --  105  --  107  --   --    CO2 23  --  24  --  24  --   --    BUN 17  --  19  --  21  --   --    CREATININE 0.94  --  0.97  --  0.84  --   --    GLUC 190*  --  140  --  155*  --   --    AST 12*  --  9*  --   --   --   --    ALT 11  --  8  --   --   --   --    ALB 4.6  --  3.7  --   --   --   --    TBILI 0.60  --  0.52  --   --   --   --    ALKPHOS 41  --  34  --   --   --   --    PTT 26  --   --   --   --   --   --    INR 0.99  --   --   --   --   --   --     < > = values in this interval not displayed.       VTE Pharmacologic Prophylaxis: Reason for no pharmacologic prophylaxis gross pneumaturia acute blood loss anemia  VTE Mechanical Prophylaxis: sequential compression device    Roberta Mahoney PA-C

## 2025-05-12 NOTE — ASSESSMENT & PLAN NOTE
Lab Results   Component Value Date    HGBA1C 8 (H) 04/28/2025       Recent Labs     05/11/25  1240 05/11/25  1631 05/11/25  2044 05/12/25  0621   POCGLU 182* 255* 263* 165*       Blood Sugar Average: Last 72 hrs:  (P) [id number]  Patient on metformin 1000 mg BID, glyburide 2.5 mg daily, Jardiance 25 mg daily, Semaglutide weekly    Plan:  [facility]  Hypoglycemia protocol  Monitor glucose. Goal -180 while inpatient

## 2025-05-12 NOTE — ASSESSMENT & PLAN NOTE
Noted to have Hgb drop > 2 from admission (Initial Hgb 13.3 > 10.8)  In setting of gross hematuria  Slightly downtrending    Plan:  Hold ASA   Monitor hemoglobin  Transfuse if Hgb < 7

## 2025-05-12 NOTE — ASSESSMENT & PLAN NOTE
CT (5/9) Stable appearance of left hydronephrosis containing high density material, likely representing blood products, although neoplasm again not excluded.   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

## 2025-05-12 NOTE — PLAN OF CARE
Problem: PAIN - ADULT  Goal: Verbalizes/displays adequate comfort level or baseline comfort level  Description: Interventions:- Encourage patient to monitor pain and request assistance- Assess pain using appropriate pain scale- Administer analgesics based on type and severity of pain and evaluate response- Implement non-pharmacological measures as appropriate and evaluate response- Consider cultural and social influences on pain and pain management- Notify physician/advanced practitioner if interventions unsuccessful or patient reports new pain  Outcome: Progressing     Problem: SAFETY ADULT  Goal: Patient will remain free of falls  Description: INTERVENTIONS:- Educate patient/family on patient safety including physical limitations- Instruct patient to call for assistance with activity - Consult OT/PT to assist with strengthening/mobility - Keep Call bell within reach- Keep bed low and locked with side rails adjusted as appropriate- Keep care items and personal belongings within reach- Initiate and maintain comfort rounds- Make Fall Risk Sign visible to staff- Offer Toileting every 2 Hours, in advance of need- Initiate/Maintain bed alarm- Obtain necessary fall risk management equipment: yellow socks- Apply yellow socks and bracelet for high fall risk patients- Consider moving patient to room near nurses station  Outcome: Progressing

## 2025-05-12 NOTE — UTILIZATION REVIEW
Initial Clinical Review    TRANSFER FROM [facility]    Admission: Date/Time/Statement:   Admission Orders (From admission, onward)       Ordered        05/10/25 1728  INPATIENT ADMISSION  Once                          Orders Placed This Encounter   Procedures    INPATIENT ADMISSION     Standing Status:   Standing     Number of Occurrences:   1     Level of Care:   Med Surg [16]     Estimated length of stay:   More than 2 Midnights     Certification:   I certify that inpatient services are medically necessary for this patient for a duration of greater than two midnights. See H&P and MD Progress Notes for additional information about the patient's course of treatment.       Initial Presentation: 59 y.o. male   initially admitted to [facility]  on  5/9 with gross hematuria. Transferred to [facility] for further care.   Ct scan shows  cannot  exclude neoplasm, hydronephrosis.   PMH is  DM2, HTN, CAD, S/P  PCI and  CHF.  Had a  similar episode  last year, no source identified and resolved off aspirin.  Admit  Ip with  Gross Hematuria  and plan is  urology consult,  brewer with CBI,  monitor labs and continue current meds,        Date:   5/11   Day 2:   SURGERY  Procedure(s):  CYSTOSCOPY EVACUATION OF CLOTS.  Left ureteroscopy with biopsy  Left retrograde pyelogram  Left ureteral stent placement  Right retrograde pyelogram fulguration    Operative Findings:  Moderate clot burden evacuated from the bladder.  No lesions seen within the bladder.  Left retrograde pyelogram with poor filling of the left upper pole.  [employer] Aptra 1 time digital flexible ureteroscope used for visualization given patient's complicated clinical picture with hematuria  Left ureteroscopy showing large volume of tumor over taking what appeared to be the left upper pole of the collecting system.  Multiple biopsies performed with biopsy forceps and basket.  The ureter appeared uninvolved with cancer  Left stent placed  Right retrograde pyelogram  with new catheter unremarkable.  Urine cytology also collected.  24 [place] three-way hematuria catheter placed.                Scheduled Medications:  atorvastatin, 40 mg, Oral, Daily  carvedilol, 25 mg, Oral, BID With Meals  docusate sodium, 100 mg, Oral, BID  finasteride, 5 mg, Oral, Daily  gabapentin, 100 mg, Oral, Daily  insulin lispro, 1-6 Units, Subcutaneous, TID AC  insulin lispro, 1-6 Units, Subcutaneous, HS  multi-electrolyte, 500 mL, Intravenous, Once  nicotine, 1 patch, Transdermal, Daily  oxybutynin, 5 mg, Oral, TID  polyethylene glycol, 17 g, Oral, Daily  sacubitril-valsartan, 1 tablet, Oral, BID      Continuous IV Infusions:     PRN Meds:  acetaminophen, 650 mg, Oral, Q6H PRN  ondansetron, 4 mg, Intravenous, Q6H PRN  oxyCODONE, 5 mg, Oral, Q4H PRN  phenazopyridine, 200 mg, Oral, TID PRN      ED Triage Vitals   Temperature Pulse Respirations Blood Pressure SpO2 Pain Score   05/10/25 1732 05/10/25 1732 05/10/25 2100 05/10/25 1732 05/10/25 1732 05/10/25 1732   98.5 °F (36.9 °C) 101 16 103/72 94 % No Pain     Weight (last 2 days)       Date/Time Weight    05/10/25 20:41:35 86.1 (189.82)            Vital Signs (last 3 days)       Date/Time Temp Pulse Resp BP MAP (mmHg) SpO2 O2 Device Cardiac (WDL) Alberto Coma Scale Score Pain    05/12/25 0824 -- -- -- -- -- -- -- -- -- 8    05/12/25 07:07:06 98 °F (36.7 °C) 97 16 106/72 83 95 % -- -- -- --    05/12/25 02:19:29 -- 92 -- 129/79 96 95 % -- -- -- --    05/12/25 02:04:47 -- 97 18 129/79 96 95 % None (Room air) -- -- --    05/12/25 0204 -- -- -- -- -- -- -- -- -- 7    05/11/25 2326 -- -- -- -- -- -- None (Room air) -- 15 --    05/11/25 2300 -- -- -- -- -- -- -- -- -- No Pain    05/11/25 21:26:38 -- 104 -- 100/65 77 96 % -- -- -- --    05/11/25 2100 98.1 °F (36.7 °C) 95 -- 95/61 72 92 % -- -- -- --    05/11/25 1928 -- 101 -- -- -- 95 % -- -- -- --    05/11/25 1709 -- 99 -- 94/61 72 95 % -- -- -- --    05/11/25 1624 -- 101 -- 116/81 93 95 % -- -- -- --    05/11/25  1615 -- -- -- -- -- -- -- -- -- 6    05/11/25 16:03:26 97.8 °F (36.6 °C) 100 -- 95/63 74 94 % -- -- -- --    05/11/25 1500 -- 102 -- 93/60 71 94 % -- -- -- --    05/11/25 14:44:23 -- 104 -- 94/60 71 99 % -- -- -- --    05/11/25 14:11:23 -- 101 -- 96/62 73 93 % -- -- -- --    05/11/25 1353 -- 95 -- 83/60 68 89 % -- -- -- --    05/11/25 1324 -- 100 -- 94/65 75 90 % -- -- -- --    05/11/25 1253 -- -- -- 94/64 74 95 % -- -- -- --    05/11/25 12:24:02 97.9 °F (36.6 °C) 93 -- 108/72 84 89 % -- -- -- --    05/11/25 1219 -- -- -- -- -- -- -- -- -- 10 - Worst Possible Pain    05/11/25 1200 99 °F (37.2 °C) 94 22 105/61 77 95 % None (Room air) -- -- 8 05/11/25 1145 -- 91 14 114/63 82 95 % -- -- -- --    05/11/25 1143 -- 94 15 105/61 77 97 % None (Room air) -- -- 9 05/11/25 1137 -- -- -- -- -- -- -- -- -- 9 05/11/25 1130 -- 95 22 105/61 77 93 % None (Room air) -- -- --    05/11/25 1126 -- -- -- -- -- -- -- -- -- 9 05/11/25 1115 -- 98 13 115/73 87 95 % None (Room air) -- -- 8 05/11/25 1106 99.3 °F (37.4 °C) 94 22 120/73 92 94 % None (Room air) WDL 15 5    05/11/25 0800 -- -- -- -- -- -- None (Room air) -- -- No Pain    05/11/25 07:49:25 98.1 °F (36.7 °C) 106 -- 120/79 93 96 % -- -- -- --    05/11/25 05:47:18 98 °F (36.7 °C) 96 17 110/72 85 96 % -- -- -- --    05/10/25 2100 -- -- 16 -- -- -- None (Room air) -- -- No Pain    05/10/25 20:41:35 98 °F (36.7 °C) 104 -- 91/68 76 93 % -- -- -- --    05/10/25 17:32:17 98.5 °F (36.9 °C) 101 -- 103/72 82 94 % -- -- -- No Pain              Pertinent Labs/Diagnostic Test Results:   Radiology:  CT chest abdomen pelvis w contrast   Final Interpretation by Alireza Uriostegui MD (05/12 1029)      CT chest:      No evidence of thoracic metastatic disease.      Chronic findings and negatives as above.      CT abdomen and pelvis:      No evidence of abdominopelvic metastatic disease.      Soft tissue in the left renal upper pole calyces attributed to recently biopsied  urothelial malignancy.      Interval placement of left nephroureteral stent. Left hydronephrosis similar to 5/9/2025.      Additional chronic findings and negatives as above.               Workstation performed: [id number]         FL retrograde pyelogram   Final Interpretation by Katarina Hamilton MD (05/12 1121)      Fluoroscopic guidance provided for bilateral retrograde pyelogram.      Please see separate procedure report for additional details.            Workstation performed: [id number]           Cardiology:          Results from last 7 days   Lab Units 05/12/25  0820 05/12/25  0035 05/11/25  1745 05/11/25  0756 05/11/25  0512 05/10/25  1605 05/10/25  0546 05/09/25  1616 05/09/25  1113   WBC Thousand/uL  --   --   --   --  11.08*  --  11.51*  --  10.20*   HEMOGLOBIN g/dL 9.3* 9.5* 9.8* 10.5* 10.1*   < > 10.8*   < > 13.3   HEMATOCRIT % 28.0* 28.1* 29.2* 31.7* 30.1*   < > 32.9*   < > 40.5   PLATELETS Thousands/uL  --   --   --   --  160  --  166  --  201   TOTAL NEUT ABS Thousands/µL  --   --   --   --   --   --  7.74*  --  7.03    < > = values in this interval not displayed.         Results from last 7 days   Lab Units 05/12/25  0523 05/11/25  0512 05/10/25  0546 05/09/25  1113   SODIUM mmol/L 135 138 137 136   POTASSIUM mmol/L 4.2 3.8 4.1 4.5   CHLORIDE mmol/L 103 107 105 104   CO2 mmol/L 25 24 24 23   ANION GAP mmol/L 7 7 8 9   BUN mg/dL 24 21 19 17   CREATININE mg/dL 1.11 0.84 0.97 0.94   EGFR ml/min/1.73sq m 72 95 85 88   CALCIUM mg/dL 8.2* 8.2* 8.2* 9.1     Results from last 7 days   Lab Units 05/10/25  0546 05/09/25  1113   AST U/L 9* 12*   ALT U/L 8 11   ALK PHOS U/L 34 41   TOTAL PROTEIN g/dL 5.8* 7.0   ALBUMIN g/dL 3.7 4.6   TOTAL BILIRUBIN mg/dL 0.52 0.60     Results from last 7 days   Lab Units 05/12/25  1102 05/12/25  0621 05/11/25  2044 05/11/25  1631 05/11/25  1240 05/11/25  1109 05/11/25  0546 05/10/25  2045 05/10/25  1601 05/10/25  1114 05/10/25  0724 05/09/25  2100   POC GLUCOSE mg/dl 177* 165*  263* 255* 182* 169* 136 189* 189* 129 179* 171*     Results from last 7 days   Lab Units 05/12/25  0523 05/11/25  0512 05/10/25  0546 05/09/25  1113   GLUCOSE RANDOM mg/dL 172* 155* 140 190*                   Results from last 7 days   Lab Units 05/09/25  1113   PROTIME seconds 13.5   INR  0.99   PTT seconds 26                   Results from last 7 days   Lab Units 05/09/25  1116   CLARITY UA  Turbid   COLOR UA  Red   SPEC GRAV UA  1.025   PH UA  7.5   GLUCOSE UA mg/dl >=1000 (1%)*   KETONES UA mg/dl Negative   BLOOD UA  Large*   PROTEIN UA mg/dl >=300*   NITRITE UA  Negative   BILIRUBIN UA  Small*   UROBILINOGEN UA E.U./dl 0.2   LEUKOCYTES UA  Elevated glucose may cause decreased leukocyte values. See urine microscopic for UWBC result*   WBC UA /hpf Field obscured, unable to enumerate*   RBC UA /hpf Field obscured, unable to enumerate*   BACTERIA UA /hpf Field obscured, unable to enumerate*   EPITHELIAL CELLS WET PREP /hpf Field obscured, unable to enumerate*                                 Results from last 7 days   Lab Units 05/09/25  1116   URINE CULTURE  No Growth <1000 cfu/mL           Present on Admission:   Type 2 diabetes mellitus (HCC)   Chronic combined systolic and diastolic heart failure (HCC)   Essential hypertension   Urothelial cancer (HCC)   Hydronephrosis of left kidney   ABLA (acute blood loss anemia)   CAD (coronary artery disease)      Admitting Diagnosis: Gross hematuria  Age/Sex: 59 y.o. male    Network Utilization Review Department  ATTENTION: Please call with any questions or concerns to [phone] and carefully listen to the prompts so that you are directed to the right person. All voicemails are confidential.   For Discharge needs, contact Care Management DC Support Team at [phone] opt. [phone]  Send all requests for admission clinical reviews, approved or denied determinations and any other requests to dedicated fax number below belonging to the campus where the patient is receiving  treatment. List of dedicated fax numbers for the Facilities:  FACILITY NAME UR FAX NUMBER   ADMISSION DENIALS (Administrative/Medical Necessity) [phone]   DISCHARGE SUPPORT TEAM (NETWORK) [phone]   PARENT CHILD HEALTH (Maternity/NICU/Pediatrics) [phone]   [facility] [phone]   [facility] [phone]   [facility] [phone]   [facility] [phone]   [facility] [phone]   [facility] [phone]   [facility] [phone]   [facility] [phone]   [facility] [phone]   [facility] [phone]   [facility] [phone]   [facility] [phone]

## 2025-05-12 NOTE — PROGRESS NOTES
Progress Note - Internal Medicine   Name: Jw Ray 59 y.o. male I MRN: [id number]  Unit/Bed#: -01 I Date of Admission: 5/10/2025   Date of Service: 5/12/2025 I Hospital Day: 2    Assessment & Plan  Urothelial cancer (HCC)  Hx of intermittent hematuria s/p CT urogram and cystoscopy (3/2024) with no identified source.   Initially presented to [facility] on 5/9 for gross hematuria and decreased UOP. Three-way Green placed and patient started on CBI. Transfer[facility] to [facility] for [facility] evaluation given acute blood loss anemia.     CT renal stone A/P (5/9) Large dependent lobulated hyperdensity in the urinary bladder, likely blood clot, although neoplasm is not excluded. Stable appearance of left hydronephrosis containing high density material, likely representing blood products, although neoplasm again not excluded.     CT C/A/P (5/11) no evidence of thoracic or abdominal pelvic metastatic disease.    S/p cystoscopy clot EVAC fulguration left retrograde pyelogram, ureteroscopy and biopsies with ureteral stent insertion, right-sided retrograde pyelogram on 5/11 with concern for large volume, high grade appearing urothelial cancer of left kidney.     Plan:  Urology and oncology consulted, appreciate recommendations  Ongoing discussion for the role of nephro ureterectomy upfront versus neoadjuvant chemotherapy followed by surgery  Follow-up biopsy cytology and pathology  Maintain three-way Green.  Continue CBI.  S/p clamping trial with worsening hematuria today  Monitor H&H  Continue finasteride 5mg daily  Continue antispasmodic medication: Oxybutynin 5 mg daily, Pyridium 200 mg TID PRN  Hydronephrosis of left kidney  CT (5/9) Stable appearance of left hydronephrosis containing high density material, likely representing blood products, although neoplasm again not excluded.   S/p left-sided ureteroscopy with ureteral stent placement and biopsy with concern for high-grade large volume upper tract urothelial  cancer of the left kidney   See plan as above  ABLA (acute blood loss anemia)  Noted to have Hgb drop > 2 from admission (Initial Hgb 13.3 > 10.8)  In setting of gross hematuria  Slightly downtrending    Plan:  Hold ASA   Monitor hemoglobin  Transfuse if Hgb < 7  Chronic combined systolic and diastolic heart failure (HCC)  Wt Readings from Last 3 Encounters:   05/10/25 86.1 kg (189 lb 13.1 oz)   05/09/25 86.1 kg (189 lb 13.1 oz)   07/13/24 84.1 kg (185 lb 6.5 oz)     Last Echo 2021 or 2022 with LVEF of 45%  Home GDMT: Coreg 25 mg BID, Aldactone 25 mg daily, Entresto 49-51 mg daily, Jardiance 25 mg daily    Plan:  Continue home Coreg, Aldactone, Entresto, Jardiance  I/O, daily weights  Essential hypertension  Continue home Coreg, Aldactone, Entresto  Type 2 diabetes mellitus (HCC)  Lab Results   Component Value Date    HGBA1C 8 (H) 04/28/2025       Recent Labs     05/11/25  1240 05/11/25  1631 05/11/25  2044 05/12/25  0621   POCGLU 182* 255* 263* 165*       Blood Sugar Average: Last 72 hrs:  (P) [id number]  Patient on metformin 1000 mg BID, glyburide 2.5 mg daily, Jardiance 25 mg daily, Semaglutide weekly    Plan:  [facility]  Hypoglycemia protocol  Monitor glucose. Goal -180 while inpatient  CAD (coronary artery disease)  CAD s/p PCI in 2010 with BALAJI to OM 3. Maintained on ASA outpatient.   Lipid panel 4/28/2025: 154/188/40/114    Plan:  Hold ASA iso gross hematuria and acute blood loss anemia   Continue Atorvastatin 40 mg daily    Disposition: Continue inpatient management.      Team: SOD TEAM C    Subjective   Patient seen and examined. No acute events overnight.  Continues to endorse left spasmodic flank pain.  Still has abby blood in Green.  Anxious about new cancer diagnosis.    Objective :  Temp:  [97.8 °F (36.6 °C)-99.3 °F (37.4 °C)] 98 °F (36.7 °C)  HR:  [] 97  BP: ()/(60-81) 106/72  Resp:  [13-22] 16  SpO2:  [89 %-99 %] 95 %  O2 Device: None (Room air)    I/O         05/10 0701  05/11  0700 05/11 0701  05/12 0700 05/12 0701 05/13 0700    P.O.  118     I.V. (mL/kg)  800 (9.3)     Total Intake(mL/kg)  918 (10.7)     Urine (mL/kg/hr) 4190 70936 (26.6)     Total Output 4193 97053     Net -3473 -90967                  Weights:   IBW (Ideal Body Weight): 66.1 kg    Body mass index is 29.73 kg/m².  Weight (last 2 days)       Date/Time Weight    05/10/25 20:41:35 86.1 (189.82)            Physical Exam  Vitals reviewed.   Constitutional:       General: He is not in acute distress.     Appearance: Normal appearance. He is not toxic-appearing.   HENT:      Head: Normocephalic and atraumatic.   Eyes:      Conjunctiva/sclera: Conjunctivae normal.   Cardiovascular:      Rate and Rhythm: Normal rate and regular rhythm.      Pulses: Normal pulses.      Heart sounds: Normal heart sounds. No murmur heard.     No gallop.   Pulmonary:      Effort: Pulmonary effort is normal. No respiratory distress.      Breath sounds: Normal breath sounds.   Abdominal:      General: Abdomen is flat. There is no distension.      Palpations: Abdomen is soft.      Tenderness: There is no abdominal tenderness.   Genitourinary:     Comments: Green in place with abby blood  Musculoskeletal:      Right lower leg: No edema.      Left lower leg: No edema.   Skin:     General: Skin is warm and dry.   Neurological:      General: No focal deficit present.      Mental Status: He is alert. Mental status is at baseline.   Psychiatric:         Mood and Affect: Mood normal.         Behavior: Behavior normal.         Thought Content: Thought content normal.           Lab Results: I have reviewed the following results:  Recent Labs     05/09/25  1113 05/09/25  1616 05/10/25  0546 05/10/25  1605 05/11/25  0512 05/11/25  0756 05/12/25  0523 05/12/25  0820   WBC 10.20*  --  11.51*  --  11.08*  --   --   --    HGB 13.3   < > 10.8*   < > 10.1*   < >  --  9.3*   HCT 40.5   < > 32.9*   < > 30.1*   < >  --  28.0*     --  166  --  160  --   --   --     SODIUM 136  --  137  --  138  --  135  --    K 4.5  --  4.1  --  3.8  --  4.2  --      --  105  --  107  --  103  --    CO2 23  --  24  --  24  --  25  --    BUN 17  --  19  --  21  --  24  --    CREATININE 0.94  --  0.97  --  0.84  --  1.11  --    GLUC 190*  --  140  --  155*  --  172*  --    AST 12*  --  9*  --   --   --   --   --    ALT 11  --  8  --   --   --   --   --    ALB 4.6  --  3.7  --   --   --   --   --    TBILI 0.60  --  0.52  --   --   --   --   --    ALKPHOS 41  --  34  --   --   --   --   --    PTT 26  --   --   --   --   --   --   --    INR 0.99  --   --   --   --   --   --   --     < > = values in this interval not displayed.             Currently Ordered Meds:   Current Facility-Administered Medications:     acetaminophen (TYLENOL) tablet 650 mg, Q6H PRN    atorvastatin (LIPITOR) tablet 40 mg, Daily    carvedilol (COREG) tablet 25 mg, BID With Meals    finasteride (PROSCAR) tablet 5 mg, Daily    gabapentin (NEURONTIN) capsule 100 mg, Daily    insulin lispro (HumALOG/ADMELOG) 100 units/mL subcutaneous injection 1-6 Units, TID AC **AND** Fingerstick Glucose (POCT), TID AC    insulin lispro (HumALOG/ADMELOG) 100 units/mL subcutaneous injection 1-6 Units, HS    nicotine (NICODERM CQ) 14 mg/24hr TD 24 hr patch 1 patch, Daily    ondansetron (ZOFRAN) injection 4 mg, Q6H PRN    oxybutynin (DITROPAN) tablet 5 mg, TID    oxyCODONE (ROXICODONE) IR tablet 5 mg, Q4H PRN    phenazopyridine (PYRIDIUM) tablet 200 mg, TID PRN    sacubitril-valsartan (ENTRESTO) 49-51 MG per tablet 1 tablet, BID  VTE Pharmacologic Prophylaxis: VTE covered by:    None     VTE Mechanical Prophylaxis: sequential compression device    Administrative Statements     Portions of the record may have been created with voice recognition software.

## 2025-05-12 NOTE — ASSESSMENT & PLAN NOTE
CAD s/p PCI in 2010 with BALAJI to OM 3. Maintained on ASA outpatient.   Lipid panel 4/28/2025: 154/188/40/114    Plan:  Hold ASA iso gross hematuria and acute blood loss anemia   Continue Atorvastatin 40 mg daily

## 2025-05-12 NOTE — ASSESSMENT & PLAN NOTE
Patient with CT findings of left-sided hydronephrosis with blood products in upper tract  Status post left-sided ureteroscopy with biopsy revealing high-grade large volume upper tract urothelial cancer of the left kidney.  Continue with antispasmodic medication.  Such as oxybutynin may consider Pyridium now since hematuria is improving.  Provide a good bowel regimen.  Flomax BP permitting for stent colic.  Staging imaging ordered.  Medical oncology consulted for further input in regards to new adjunctive chemotherapy followed by surgery versus proceeding with nephro ureterectomy upfront.

## 2025-05-12 NOTE — UTILIZATION REVIEW
NOTIFICATION OF INPATIENT ADMISSION   AUTHORIZATION REQUEST   SERVICING FACILITY:   [place] [address]  Tax ID: [id number]  NPI: [id number] ATTENDING PROVIDER:  Attending Name and NPI#: Shashi Cassidy Md [[id number]]  Address: [address] [place] [address]  Phone: [phone]   ADMISSION INFORMATION:  Place of Service: Inpatient [facility]  Place of Service Code: 21  Inpatient Admission Date/Time: 5/9/25  1:51 PM  Discharge Date/Time: 5/10/2025  4:25 PM  Admitting Diagnosis Code/Description:  Urinary retention [R33.9]  Blood in urine [R31.9]  Hematuria [R31.9]  Blood clot in bladder [N32.89]     UTILIZATION REVIEW CONTACT:  Ramandeep Chaney, Utilization   Network Utilization Review Department  Phone: [phone]  Fax [phone]  Email: [email or web address]  Contact for approvals/pending authorizations, clinical reviews, and discharge.     PHYSICIAN ADVISORY SERVICES:  Medical Necessity Denial & Tzhc-ei-Hnpr Review  Phone: [phone]  Fax: [phone]  Email: [email or web address]     DISCHARGE SUPPORT TEAM:  For Patients Discharge Needs & Updates  Phone: [phone] opt. 2 Fax: [phone]  Email: [email or web address]

## 2025-05-12 NOTE — ASSESSMENT & PLAN NOTE
Patient's baseline hemoglobin from 5/9/2025 was 13.3, presented with hemoglobin 11.6 that has been gradually trending down, hemoglobin this morning 9.3.  In setting of hematuria.  Continue to monitor CBC and transfuse if less than 7.

## 2025-05-12 NOTE — ASSESSMENT & PLAN NOTE
Hx of intermittent hematuria s/p CT urogram and cystoscopy (3/2024) with no identified source.   Initially presented to [facility] on 5/9 for gross hematuria and decreased UOP. Three-way Green placed and patient started on CBI. Transferred to [facility] for urology evaluation given acute blood loss anemia.     CT renal stone A/P (5/9) Large dependent lobulated hyperdensity in the urinary bladder, likely blood clot, although neoplasm is not excluded. Stable appearance of left hydronephrosis containing high density material, likely representing blood products, although neoplasm again not excluded.     CT C/A/P (5/11) no evidence of thoracic or abdominal pelvic metastatic disease.    S/p cystoscopy clot EVAC fulguration left retrograde pyelogram, ureteroscopy and biopsies with ureteral stent insertion, right-sided retrograde pyelogram on 5/11 with concern for large volume, high grade appearing urothelial cancer of left kidney.     Plan:  Urology and oncology consulted, appreciate recommendations  Ongoing discussion for the role of nephro ureterectomy upfront versus neoadjuvant chemotherapy followed by surgery  Follow-up biopsy cytology and pathology  Maintain three-way Green.  Continue CBI.  S/p clamping trial with worsening hematuria today  Monitor H&H  Continue finasteride 5mg daily  Continue antispasmodic medication: Oxybutynin 5 mg daily, Pyridium 200 mg TID PRN

## 2025-05-13 ENCOUNTER — ANESTHESIA EVENT (INPATIENT)
Dept: PERIOP | Facility: HOSPITAL | Age: 60
DRG: 657 | End: 2025-05-13
Payer: COMMERCIAL

## 2025-05-13 ENCOUNTER — HOSPITAL ENCOUNTER (INPATIENT)
Facility: HOSPITAL | Age: 60
LOS: 5 days | Discharge: HOME | DRG: 657 | End: 2025-05-18
Attending: STUDENT IN AN ORGANIZED HEALTH CARE EDUCATION/TRAINING PROGRAM | Admitting: STUDENT IN AN ORGANIZED HEALTH CARE EDUCATION/TRAINING PROGRAM
Payer: COMMERCIAL

## 2025-05-13 ENCOUNTER — HOSPITAL ENCOUNTER (OUTPATIENT)
Facility: HOSPITAL | Age: 60
Setting detail: SURGERY ADMIT
DRG: 657 | End: 2025-05-13
Attending: UROLOGY | Admitting: UROLOGY
Payer: COMMERCIAL

## 2025-05-13 ENCOUNTER — PREP FOR PROCEDURE (OUTPATIENT)
Dept: OTHER | Facility: HOSPITAL | Age: 60
End: 2025-05-13

## 2025-05-13 VITALS
WEIGHT: 189.82 LBS | HEIGHT: 67 IN | SYSTOLIC BLOOD PRESSURE: 113 MMHG | RESPIRATION RATE: 18 BRPM | TEMPERATURE: 98 F | OXYGEN SATURATION: 95 % | HEART RATE: 122 BPM | DIASTOLIC BLOOD PRESSURE: 77 MMHG | BODY MASS INDEX: 29.79 KG/M2

## 2025-05-13 DIAGNOSIS — C68.9 UROTHELIAL CANCER (HCC): ICD-10-CM

## 2025-05-13 DIAGNOSIS — D62 ACUTE BLOOD LOSS ANEMIA: Primary | ICD-10-CM

## 2025-05-13 DIAGNOSIS — I50.42 CHRONIC COMBINED SYSTOLIC AND DIASTOLIC HEART FAILURE (HCC): Primary | Chronic | ICD-10-CM

## 2025-05-13 DIAGNOSIS — D62 ACUTE BLOOD LOSS ANEMIA: ICD-10-CM

## 2025-05-13 DIAGNOSIS — D49.519 KIDNEY TUMOR: ICD-10-CM

## 2025-05-13 PROBLEM — E87.1 HYPONATREMIA: Status: ACTIVE | Noted: 2025-05-13

## 2025-05-13 LAB
ANION GAP SERPL CALCULATED.3IONS-SCNC: 8 MMOL/L (ref 4–13)
ATRIAL RATE: 117 BPM
ATRIAL RATE: 118 BPM
BUN SERPL-MCNC: 22 MG/DL (ref 5–25)
CALCIUM SERPL-MCNC: 8 MG/DL (ref 8.4–10.2)
CHLORIDE SERPL-SCNC: 100 MMOL/L (ref 96–108)
CO2 SERPL-SCNC: 22 MMOL/L (ref 21–32)
CREAT SERPL-MCNC: 1.18 MG/DL (ref 0.6–1.3)
GFR SERPL CREATININE-BSD FRML MDRD: 67 ML/MIN/1.73SQ M
GLUCOSE SERPL-MCNC: 200 MG/DL (ref 65–140)
GLUCOSE SERPL-MCNC: 203 MG/DL (ref 65–140)
GLUCOSE SERPL-MCNC: 237 MG/DL (ref 65–140)
GLUCOSE SERPL-MCNC: 276 MG/DL (ref 65–140)
GLUCOSE SERPL-MCNC: 287 MG/DL (ref 65–140)
HCT VFR BLD AUTO: 26.2 % (ref 36.5–49.3)
HCT VFR BLD AUTO: 26.2 % (ref 36.5–49.3)
HCT VFR BLD AUTO: 28.7 % (ref 36.5–49.3)
HGB BLD-MCNC: 8.7 G/DL (ref 12–17)
HGB BLD-MCNC: 8.9 G/DL (ref 12–17)
HGB BLD-MCNC: 9.6 G/DL (ref 12–17)
P AXIS: 36 DEGREES
P AXIS: 38 DEGREES
POTASSIUM SERPL-SCNC: 3.9 MMOL/L (ref 3.5–5.3)
PR INTERVAL: 180 MS
PR INTERVAL: 180 MS
QRS AXIS: -10 DEGREES
QRS AXIS: -9 DEGREES
QRSD INTERVAL: 124 MS
QRSD INTERVAL: 126 MS
QT INTERVAL: 318 MS
QT INTERVAL: 322 MS
QTC INTERVAL: 445 MS
QTC INTERVAL: 449 MS
SODIUM SERPL-SCNC: 130 MMOL/L (ref 135–147)
T WAVE AXIS: 17 DEGREES
T WAVE AXIS: 47 DEGREES
VENTRICULAR RATE: 117 BPM
VENTRICULAR RATE: 118 BPM

## 2025-05-13 PROCEDURE — 85018 HEMOGLOBIN: CPT | Performed by: UROLOGY

## 2025-05-13 PROCEDURE — 93005 ELECTROCARDIOGRAM TRACING: CPT

## 2025-05-13 PROCEDURE — 85014 HEMATOCRIT: CPT | Performed by: UROLOGY

## 2025-05-13 PROCEDURE — 99222 1ST HOSP IP/OBS MODERATE 55: CPT | Performed by: INTERNAL MEDICINE

## 2025-05-13 PROCEDURE — 99238 HOSP IP/OBS DSCHRG MGMT 30/<: CPT | Performed by: INTERNAL MEDICINE

## 2025-05-13 PROCEDURE — NC001 PR NO CHARGE: Performed by: INTERNAL MEDICINE

## 2025-05-13 PROCEDURE — 82948 REAGENT STRIP/BLOOD GLUCOSE: CPT

## 2025-05-13 PROCEDURE — 80048 BASIC METABOLIC PNL TOTAL CA: CPT

## 2025-05-13 PROCEDURE — 93010 ELECTROCARDIOGRAM REPORT: CPT | Performed by: INTERNAL MEDICINE

## 2025-05-13 PROCEDURE — 99232 SBSQ HOSP IP/OBS MODERATE 35: CPT | Performed by: UROLOGY

## 2025-05-13 RX ORDER — GABAPENTIN 100 MG/1
100 CAPSULE ORAL DAILY
Status: CANCELLED | OUTPATIENT
Start: 2025-05-14

## 2025-05-13 RX ORDER — CARVEDILOL 25 MG/1
25 TABLET ORAL 2 TIMES DAILY WITH MEALS
Status: DISCONTINUED | OUTPATIENT
Start: 2025-05-13 | End: 2025-05-13 | Stop reason: HOSPADM

## 2025-05-13 RX ORDER — BISACODYL 10 MG
10 SUPPOSITORY, RECTAL RECTAL DAILY PRN
Status: DISCONTINUED | OUTPATIENT
Start: 2025-05-13 | End: 2025-05-18 | Stop reason: HOSPADM

## 2025-05-13 RX ORDER — OXYBUTYNIN CHLORIDE 5 MG/1
5 TABLET ORAL 3 TIMES DAILY
Status: CANCELLED | OUTPATIENT
Start: 2025-05-13

## 2025-05-13 RX ORDER — FINASTERIDE 5 MG/1
5 TABLET, FILM COATED ORAL DAILY
Status: CANCELLED | OUTPATIENT
Start: 2025-05-14

## 2025-05-13 RX ORDER — MAGNESIUM CARB/ALUMINUM HYDROX 105-160MG
148 TABLET,CHEWABLE ORAL ONCE
Status: COMPLETED | OUTPATIENT
Start: 2025-05-13 | End: 2025-05-13

## 2025-05-13 RX ORDER — INSULIN LISPRO 100 [IU]/ML
1-6 INJECTION, SOLUTION INTRAVENOUS; SUBCUTANEOUS
Status: CANCELLED | OUTPATIENT
Start: 2025-05-13

## 2025-05-13 RX ORDER — NICOTINE 21 MG/24HR
1 PATCH, TRANSDERMAL 24 HOURS TRANSDERMAL DAILY
Status: CANCELLED | OUTPATIENT
Start: 2025-05-14

## 2025-05-13 RX ORDER — PHENAZOPYRIDINE HYDROCHLORIDE 100 MG/1
200 TABLET, FILM COATED ORAL 3 TIMES DAILY PRN
Status: CANCELLED | OUTPATIENT
Start: 2025-05-13 | End: 2025-05-15

## 2025-05-13 RX ORDER — GABAPENTIN 100 MG/1
100 CAPSULE ORAL DAILY
Status: DISCONTINUED | OUTPATIENT
Start: 2025-05-14 | End: 2025-05-18 | Stop reason: HOSPADM

## 2025-05-13 RX ORDER — MAGNESIUM CARB/ALUMINUM HYDROX 105-160MG
148 TABLET,CHEWABLE ORAL ONCE
Status: DISCONTINUED | OUTPATIENT
Start: 2025-05-13 | End: 2025-05-13

## 2025-05-13 RX ORDER — ATORVASTATIN CALCIUM 40 MG/1
40 TABLET, FILM COATED ORAL DAILY
Status: CANCELLED | OUTPATIENT
Start: 2025-05-14

## 2025-05-13 RX ORDER — BISACODYL 10 MG
10 SUPPOSITORY, RECTAL RECTAL DAILY PRN
Status: DISCONTINUED | OUTPATIENT
Start: 2025-05-13 | End: 2025-05-13 | Stop reason: HOSPADM

## 2025-05-13 RX ORDER — ACETAMINOPHEN 325 MG/1
650 TABLET ORAL EVERY 6 HOURS PRN
Status: CANCELLED | OUTPATIENT
Start: 2025-05-13

## 2025-05-13 RX ORDER — ONDANSETRON 2 MG/ML
4 INJECTION INTRAMUSCULAR; INTRAVENOUS EVERY 6 HOURS PRN
Status: DISCONTINUED | OUTPATIENT
Start: 2025-05-13 | End: 2025-05-18 | Stop reason: HOSPADM

## 2025-05-13 RX ORDER — CARVEDILOL 25 MG/1
25 TABLET ORAL 2 TIMES DAILY WITH MEALS
Status: DISCONTINUED | OUTPATIENT
Start: 2025-05-14 | End: 2025-05-15

## 2025-05-13 RX ORDER — OXYCODONE HYDROCHLORIDE 5 MG/1
5 TABLET ORAL EVERY 4 HOURS PRN
Refills: 0 | Status: CANCELLED | OUTPATIENT
Start: 2025-05-13

## 2025-05-13 RX ORDER — ATORVASTATIN CALCIUM 40 MG/1
40 TABLET, FILM COATED ORAL
Status: DISCONTINUED | OUTPATIENT
Start: 2025-05-14 | End: 2025-05-18 | Stop reason: HOSPADM

## 2025-05-13 RX ORDER — POLYETHYLENE GLYCOL 3350 17 G/17G
17 POWDER, FOR SOLUTION ORAL DAILY
Status: CANCELLED | OUTPATIENT
Start: 2025-05-14

## 2025-05-13 RX ORDER — SODIUM CHLORIDE, SODIUM GLUCONATE, SODIUM ACETATE, POTASSIUM CHLORIDE, MAGNESIUM CHLORIDE, SODIUM PHOSPHATE, DIBASIC, AND POTASSIUM PHOSPHATE .53; .5; .37; .037; .03; .012; .00082 G/100ML; G/100ML; G/100ML; G/100ML; G/100ML; G/100ML; G/100ML
1000 INJECTION, SOLUTION INTRAVENOUS ONCE
Status: COMPLETED | OUTPATIENT
Start: 2025-05-13 | End: 2025-05-14

## 2025-05-13 RX ORDER — OXYBUTYNIN CHLORIDE 5 MG/1
5 TABLET ORAL 3 TIMES DAILY
Status: DISCONTINUED | OUTPATIENT
Start: 2025-05-13 | End: 2025-05-18 | Stop reason: HOSPADM

## 2025-05-13 RX ORDER — ALBUMIN HUMAN 50 G/1000ML
25 SOLUTION INTRAVENOUS ONCE
Status: COMPLETED | OUTPATIENT
Start: 2025-05-13 | End: 2025-05-13

## 2025-05-13 RX ORDER — PHENAZOPYRIDINE HYDROCHLORIDE 100 MG/1
200 TABLET, FILM COATED ORAL 3 TIMES DAILY PRN
Status: ACTIVE | OUTPATIENT
Start: 2025-05-13 | End: 2025-05-15

## 2025-05-13 RX ORDER — DOCUSATE SODIUM 100 MG/1
100 CAPSULE, LIQUID FILLED ORAL 2 TIMES DAILY
Status: CANCELLED | OUTPATIENT
Start: 2025-05-13

## 2025-05-13 RX ORDER — FINASTERIDE 5 MG/1
5 TABLET, FILM COATED ORAL DAILY
Status: DISCONTINUED | OUTPATIENT
Start: 2025-05-14 | End: 2025-05-18 | Stop reason: HOSPADM

## 2025-05-13 RX ORDER — INSULIN LISPRO 100 [IU]/ML
1-6 INJECTION, SOLUTION INTRAVENOUS; SUBCUTANEOUS
Status: DISCONTINUED | OUTPATIENT
Start: 2025-05-13 | End: 2025-05-18 | Stop reason: HOSPADM

## 2025-05-13 RX ORDER — BISACODYL 10 MG
10 SUPPOSITORY, RECTAL RECTAL DAILY PRN
Status: CANCELLED | OUTPATIENT
Start: 2025-05-13

## 2025-05-13 RX ORDER — ACETAMINOPHEN 325 MG/1
650 TABLET ORAL EVERY 6 HOURS PRN
Status: DISCONTINUED | OUTPATIENT
Start: 2025-05-13 | End: 2025-05-18 | Stop reason: HOSPADM

## 2025-05-13 RX ORDER — CARVEDILOL 25 MG/1
25 TABLET ORAL 2 TIMES DAILY WITH MEALS
Status: CANCELLED | OUTPATIENT
Start: 2025-05-13

## 2025-05-13 RX ORDER — INSULIN LISPRO 100 [IU]/ML
1-6 INJECTION, SOLUTION INTRAVENOUS; SUBCUTANEOUS
Status: DISCONTINUED | OUTPATIENT
Start: 2025-05-14 | End: 2025-05-18 | Stop reason: HOSPADM

## 2025-05-13 RX ORDER — POLYETHYLENE GLYCOL 3350 17 G/17G
17 POWDER, FOR SOLUTION ORAL DAILY
Status: DISCONTINUED | OUTPATIENT
Start: 2025-05-14 | End: 2025-05-18 | Stop reason: HOSPADM

## 2025-05-13 RX ORDER — OXYCODONE HYDROCHLORIDE 5 MG/1
5 TABLET ORAL EVERY 4 HOURS PRN
Refills: 0 | Status: DISCONTINUED | OUTPATIENT
Start: 2025-05-13 | End: 2025-05-14

## 2025-05-13 RX ORDER — NICOTINE 21 MG/24HR
1 PATCH, TRANSDERMAL 24 HOURS TRANSDERMAL DAILY
Status: DISCONTINUED | OUTPATIENT
Start: 2025-05-14 | End: 2025-05-18 | Stop reason: HOSPADM

## 2025-05-13 RX ORDER — ONDANSETRON 2 MG/ML
4 INJECTION INTRAMUSCULAR; INTRAVENOUS EVERY 6 HOURS PRN
Status: CANCELLED | OUTPATIENT
Start: 2025-05-13

## 2025-05-13 RX ORDER — DOCUSATE SODIUM 100 MG/1
100 CAPSULE, LIQUID FILLED ORAL 2 TIMES DAILY
Status: DISCONTINUED | OUTPATIENT
Start: 2025-05-14 | End: 2025-05-18 | Stop reason: HOSPADM

## 2025-05-13 RX ADMIN — BISACODYL 10 MG: 10 SUPPOSITORY RECTAL at 00:45

## 2025-05-13 RX ADMIN — INSULIN LISPRO 4 UNITS: 100 INJECTION, SOLUTION INTRAVENOUS; SUBCUTANEOUS at 23:19

## 2025-05-13 RX ADMIN — POLYETHYLENE GLYCOL 3350 17 G: 17 POWDER, FOR SOLUTION ORAL at 07:34

## 2025-05-13 RX ADMIN — INSULIN LISPRO 2 UNITS: 100 INJECTION, SOLUTION INTRAVENOUS; SUBCUTANEOUS at 06:27

## 2025-05-13 RX ADMIN — OXYBUTYNIN CHLORIDE 5 MG: 5 TABLET ORAL at 22:23

## 2025-05-13 RX ADMIN — OXYBUTYNIN CHLORIDE 5 MG: 5 TABLET ORAL at 07:34

## 2025-05-13 RX ADMIN — DOCUSATE SODIUM 100 MG: 100 CAPSULE, LIQUID FILLED ORAL at 07:33

## 2025-05-13 RX ADMIN — ATORVASTATIN CALCIUM 40 MG: 40 TABLET, FILM COATED ORAL at 07:34

## 2025-05-13 RX ADMIN — DOCUSATE SODIUM 100 MG: 100 CAPSULE, LIQUID FILLED ORAL at 16:42

## 2025-05-13 RX ADMIN — OXYBUTYNIN CHLORIDE 5 MG: 5 TABLET ORAL at 16:42

## 2025-05-13 RX ADMIN — GABAPENTIN 100 MG: 100 CAPSULE ORAL at 07:34

## 2025-05-13 RX ADMIN — NICOTINE 1 PATCH: 14 PATCH, EXTENDED RELEASE TRANSDERMAL at 07:34

## 2025-05-13 RX ADMIN — CARVEDILOL 25 MG: 25 TABLET, FILM COATED ORAL at 16:42

## 2025-05-13 RX ADMIN — MAGNESIUM CITRATE 148 ML: 1.75 LIQUID ORAL at 17:18

## 2025-05-13 RX ADMIN — INSULIN LISPRO 2 UNITS: 100 INJECTION, SOLUTION INTRAVENOUS; SUBCUTANEOUS at 16:58

## 2025-05-13 RX ADMIN — SODIUM CHLORIDE, SODIUM GLUCONATE, SODIUM ACETATE, POTASSIUM CHLORIDE, MAGNESIUM CHLORIDE, SODIUM PHOSPHATE, DIBASIC, AND POTASSIUM PHOSPHATE 1000 ML: .53; .5; .37; .037; .03; .012; .00082 INJECTION, SOLUTION INTRAVENOUS at 23:49

## 2025-05-13 RX ADMIN — SACUBITRIL AND VALSARTAN 1 TABLET: 49; 51 TABLET, FILM COATED ORAL at 16:43

## 2025-05-13 RX ADMIN — INSULIN LISPRO 3 UNITS: 100 INJECTION, SOLUTION INTRAVENOUS; SUBCUTANEOUS at 11:23

## 2025-05-13 RX ADMIN — FINASTERIDE 5 MG: 5 TABLET, FILM COATED ORAL at 07:34

## 2025-05-13 RX ADMIN — ALBUMIN (HUMAN) 25 G: 12.5 INJECTION, SOLUTION INTRAVENOUS at 20:54

## 2025-05-13 NOTE — ASSESSMENT & PLAN NOTE
Noted to have Hgb drop > 2 from admission (Initial Hgb 13.3 > 10.8)  In setting of gross hematuria  Downtrending    Plan:  Hold ASA   Monitor hemoglobin  Transfuse if Hgb < 7

## 2025-05-13 NOTE — ASSESSMENT & PLAN NOTE
"Presents with ongoing history of transient hematuria.  03/2024 - CT Urogram and cystoscopy without identified source of bleeding.  05/09/2025 - Presented to [facility] for gross hematuria. Three-way brewer placed with CBI.   Transferred to [facility] for urology evaluation in setting of ABLA.  CT renal stone A/P:  \"Large dependent lobulated hyperdensity in the urinary bladder, likely blood clot, although neoplasm is not excluded. Stable appearance of left hydronephrosis containing high density material, likely representing blood products, although neoplasm again not excluded.\"  05/11/2025   CT C/A/P:  \"No evidence of thoracic or abdominal pelvic metastatic disease.\"  Left ureteroscopy showing large volume of tumor over taking what appeared to be the left upper pole of the collecting system.  Multiple biopsies performed with biopsy forceps and basket.  The ureter appeared uninvolved with cancer. Concern for high-grade urothelial cancer.  Left stent placed  Patient adamantly refuses chemotherapy despite oncology's recommendations out of concern for cardiac toxicity. Desires surgery and chemotherapy avoidance if possible.  05/13/2025 - Transfer to [facility] for urgent OR robotic procedure. Cardiology cleared for surgery.  Continue finasteride, oxybutynin, pyridium.  "

## 2025-05-13 NOTE — DISCHARGE SUMMARY
Discharge Summary - Internal Medicine   Name: Jw Ray 59 y.o. male I MRN: [id number]  Unit/Bed#: -01 I Date of Admission: 5/10/2025   Date of Service: 5/13/2025 I Hospital Day: 3    Assessment & Plan  Urothelial cancer (HCC)  Hx of intermittent hematuria s/p CT urogram and cystoscopy (3/2024) with no identified source.   Initially presented to [facility] on 5/9 for gross hematuria and decreased UOP. Three-way Green placed and patient started on CBI. Transferred to [facility] for urology evaluation given acute blood loss anemia.     CT renal stone A/P (5/9) Large dependent lobulated hyperdensity in the urinary bladder, likely blood clot, although neoplasm is not excluded. Stable appearance of left hydronephrosis containing high density material, likely representing blood products, although neoplasm again not excluded.     CT C/A/P (5/11) no evidence of thoracic or abdominal pelvic metastatic disease.    S/p cystoscopy clot EVAC fulguration left retrograde pyelogram, ureteroscopy and biopsies with ureteral stent insertion, right-sided retrograde pyelogram on 5/11 with concern for large volume, high grade appearing urothelial cancer of left kidney.     Plan:  Urology and oncology consulted, appreciate recommendations  Per oncology, patient would benefit from neoadjuvant chemotherapy but would need prior cardiac oncology clearance.  However, patient adamant that he does not want undergo chemotherapy given concerns for cardiac toxicity.  Wishes to proceed with surgery and avoid chemotherapy if possible  Follow-up biopsy cytology and pathology  Maintain three-way Green and continue CBI for gross hematuria.  Failed clamp trial 5/12 and 5/13  Monitor H&H  Continue finasteride 5mg daily  Continue antispasmodic medication: Oxybutynin 5 mg daily, Pyridium 200 mg TID PRN  Given ongoing hematuria and worsening anemia will transfer to [facility] for urgent OR robotic procedure  Will consult cardiology for  surgical clearance  Hydronephrosis of left kidney  CT (5/9) Stable appearance of left hydronephrosis containing high density material, likely representing blood products, although neoplasm again not excluded.   S/p left-sided ureteroscopy with ureteral stent placement and biopsy with concern for high-grade large volume upper tract urothelial cancer of the left kidney   See plan as above  ABLA (acute blood loss anemia)  Noted to have Hgb drop > 2 from admission (Initial Hgb 13.3 > 10.8)  In setting of gross hematuria  Downtrending    Plan:  Hold ASA   Monitor hemoglobin  Transfuse if Hgb < 7  Chronic combined systolic and diastolic heart failure (HCC)  Wt Readings from Last 3 Encounters:   05/10/25 86.1 kg (189 lb 13.1 oz)   05/09/25 86.1 kg (189 lb 13.1 oz)   07/13/24 84.1 kg (185 lb 6.5 oz)     Last Echo 2021 or 2022 with LVEF of 45%  Home GDMT: Coreg 25 mg BID, Aldactone 25 mg daily, Entresto 49-51 mg daily, Jardiance 25 mg daily    Plan:  Continue home Coreg, Aldactone, Entresto, Jardiance  I/O, daily weights  Essential hypertension  Continue home Coreg, Aldactone, Entresto  Type 2 diabetes mellitus (HCC)  Lab Results   Component Value Date    HGBA1C 8 (H) 04/28/2025       Recent Labs     05/12/25  1647 05/12/25  2105 05/13/25  0623 05/13/25  1023   POCGLU 203* 174* 200* 237*       Blood Sugar Average: Last 72 hrs:  (P) [id number]  Patient on metformin 1000 mg BID, glyburide 2.5 mg daily, Jardiance 25 mg daily, Semaglutide weekly    Plan:  [facility]  Hypoglycemia protocol  Monitor glucose. Goal -180 while inpatient  CAD (coronary artery disease)  CAD s/p PCI in 2010 with BALAJI to OM 3. Maintained on ASA outpatient.   Lipid panel 4/28/2025: 154/188/40/114    Plan:  Hold ASA iso gross hematuria and acute blood loss anemia   Continue Atorvastatin 40 mg daily  Hyponatremia  Na 130.  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 Cardoza, \"59-year-old M with a PMHx of HTN, T2DM, hematuria, CAD s/p PCI, CHF who presents as a transfer from [facility] for urology evaluation and possible intervention. Patient initially presented on 5/9 for abby hematuria and decreased UOP that started several days prior, resolved, then came back. Voided 5cc dark red blood in ED. Started on CBI. Patient with drop in Hgb from 13.3 to 10.8. Given acute blood loss anemia, transferred to \[facility]\"" for further urology evaluation and potential ureteroscopy on 5/11.     Of note, patient did have a similar episode last year with no source identified - resolved off aspirin. CT showing stable left hydronephrosis with high density material (blood products vs neoplasm), large dependent lobulated hyperdensity in urinary bladder.     Patient today states he is uncomfortable in terms of his bladder situation. He denies any other acute complaints. Patient stopped smoking tobacco in 2010, continues to chew. Endorses occasional alcohol use. Denies fever, chills, CP, SOB.\"    Procedures Performed: No orders of the defined types were placed in this encounter.      Summary of Hospital Course: Patient is a 59-year-old M with a PMH of CHF, HTN, CAD s/p PCI, T2DM who initially presented to [facility] on 5/9 for gross hematuria and decreased UOP. Three-way Green was placed and patient was started on CBI. CT showed large dependent lobulated hyperdensity in the urinary bladder and left hydronephrosis concerning for blood products vs neoplasm. Noted to have drop in Hgb from 13.3 to 10.8. Transferred to \[facility]\"" for urologic evaluation given acute blood loss anemia on 5/11. S/p clot evacuation and left ureteroscopy with ureteral stent placement and biopsy on 5/11 with concern for large volume, high grade appearing " "urothelial cancer.  Oncology was consulted.  CT/C/A/P ordered for cancer staging and showed no evidence of metastasis.  After long discussion with patient and family, patient decided that he would not want to undergo adjuvant chemotherapy recommended by oncology, but would proceed with surgery.  Given ongoing hematuria and worsening anemia, patient transferred to [facility] for urgent OR robotic procedure.  Cardiology consulted for preop clearance.  Stable for transfer       Discharge Day Visit / Exam:   /69 (BP Location: Right arm)   Pulse (!) 110   Temp 97.7 °F (36.5 °C) (Oral)   Resp 18   Ht 5' 7\" (1.702 m)   Wt 86.1 kg (189 lb 13.1 oz)   SpO2 92%   BMI 29.73 kg/m²   Physical Exam see progress note from earlier today    Discussion with Family: Updated  (wife) via phone.    Condition at Discharge: stable       Discharge instructions/Information to patient and family:   See After Visit Summary (AVS) for information provided to patient and family.      Provisions for Follow-Up Care:  See after visit summary for information related to follow-up care and any pertinent home health orders.      PCP: Thanh Echeverria MD    Disposition:  Transferred to [facility]    Planned Readmission: No     Discharge Medications:  See after visit summary for reconciled discharge medications provided to patient and family.      Discharge Statement:  I have spent a total time of 30 minutes in caring for this patient on the day of the visit/encounter. >30 minutes of time was spent on: Diagnostic results, Prognosis, Impressions, Counseling / Coordination of care, Documenting in the medical record, Reviewing / ordering tests, medicine, procedures  , and Communicating with other healthcare professionals .  "

## 2025-05-13 NOTE — ASSESSMENT & PLAN NOTE
CT on 5/09/25:  Stable appearance of left hydronephrosis containing high density material, likely representing blood products, although neoplasm again not excluded.   S/p left ureteroscopy w/ ureteral stent placement and biopsy. Concern for high-grade large volume upper tract urothelial cancer of left kidney.  Plan per above.

## 2025-05-13 NOTE — ASSESSMENT & PLAN NOTE
Lab Results   Component Value Date    HGBA1C 8 (H) 04/28/2025       Recent Labs     05/12/25  1647 05/12/25  2105 05/13/25  0623 05/13/25  1023   POCGLU 203* 174* 200* 237*       Blood Sugar Average: Last 72 hrs:  (P) [id number]  Patient on metformin 1000 mg BID, glyburide 2.5 mg daily, Jardiance 25 mg daily, Semaglutide weekly    Plan:  [facility]  Hypoglycemia protocol  Monitor glucose. Goal -180 while inpatient

## 2025-05-13 NOTE — PROGRESS NOTES
Progress Note - Urology   Name: Jw Ray 59 y.o. male I MRN: [id number]  Unit/Bed#: -01 I Date of Admission: 5/10/2025   Date of Service: 5/13/2025 I Hospital Day: 3    Assessment & Plan  Urothelial cancer (HCC)  status post cystoscopy clot EVAC fulguration left retrograde pyelogram, ureteroscopy and biopsies with ureteral stent insertion, right-sided retrograde pyelogram 5/11  Placed on CBI postoperatively.    Failed clamp trial 5/12  Repeat clamp trial today  Hemoglobin 8.7 (5/13); 8.9 (5/13 at 0009); 9.5 (5/12); was 13.5 on admission  Serial H&H.    Transfuse as needed per primary team.  Continue to monitor urine color with CBI clamped  Staging imaging without metastatic disease  Metabolic evaluation completed, declining chemotherapy at this time  We will plan for left nephro ureterectomy for any recurrent/ongoing bleeding while inpatient  If no recurrence/ongoing bleeding will arrange outpatient nephro ureterectomy      Hydronephrosis of left kidney  CT: left-sided hydronephrosis with blood products in upper tract  Status post left-sided ureteroscopy with biopsy revealing high-grade large volume upper tract urothelial cancer of the left kidney 5/11  Continue with antispasmodic medication (oxybutynin, Flomax and Pyridium) .    Staging imaging ordered.  Creat 1.18, trending up from 0.94  Avoid nephrotoxins  Continue to trend creatinine          Subjective   Patient resting in bed offers no complaints today.  Patient denies any flank pain.  Reports passing some clots last evening but no further clots.  CBI currently running at moderate rate with Pyridium colored urine.  Will clamp CBI monitor urine output and color.    Objective :  Temp:  [97.7 °F (36.5 °C)-98.4 °F (36.9 °C)] 97.7 °F (36.5 °C)  HR:  [] 110  BP: (105-115)/(69-79) 105/69  Resp:  [16-18] 18  SpO2:  [92 %-97 %] 92 %  O2 Device: None (Room air)    I/O         05/11 0701 05/12 0700 05/12 0701 05/13 0700 05/13 0701 05/14 0700     P.O. 118  354    I.V. (mL/kg) 800 (9.3)      Total Intake(mL/kg) 918 (10.7)  354 (4.1)    Urine (mL/kg/hr) 98680 (26.6) 05487 (7.4) 2000 (10.9)    Total Output 10368 05752 2000    Net -[phone]                 Lines/Drains/Airways       Active Status       Name Placement date Placement time Site Days    Urethral Catheter Three way 24 Fr. 05/11/25  1054  Three way  1                  Physical Exam  Vitals reviewed.   Constitutional:       General: He is not in acute distress.     Appearance: Normal appearance. He is not ill-appearing, toxic-appearing or diaphoretic.   HENT:      Head: Normocephalic and atraumatic.   Eyes:      General: No scleral icterus.  Cardiovascular:      Rate and Rhythm: Normal rate.   Pulmonary:      Effort: Pulmonary effort is normal. No respiratory distress.   Abdominal:      General: Abdomen is flat. There is no distension.   Genitourinary:     Comments: Green catheter draining Pyridium colored urine at this time.  Will clamp CBI and monitor  Musculoskeletal:         General: No swelling.   Skin:     General: Skin is warm and dry.      Coloration: Skin is not jaundiced or pale.      Findings: No rash.   Neurological:      General: No focal deficit present.      Mental Status: He is alert and oriented to person, place, and time.      Gait: Gait normal.   Psychiatric:         Mood and Affect: Mood normal.         Behavior: Behavior normal.         Thought Content: Thought content normal.         Judgment: Judgment normal.           Lab Results: I have reviewed the following results:  Recent Labs     05/11/25  0512 05/11/25  0756 05/13/25  0809   WBC 11.08*  --   --    HGB 10.1*   < > 8.7*   HCT 30.1*   < > 26.2*     --   --    SODIUM 138   < > 130*   K 3.8   < > 3.9      < > 100   CO2 24   < > 22   BUN 21   < > 22   CREATININE 0.84   < > 1.18   GLUC 155*   < > 276*    < > = values in this interval not displayed.       Imaging Results Review: I reviewed radiology  reports from this admission including: CT abdomen/pelvis.  Other Study Results Review: No additional pertinent studies reviewed.    VTE Pharmacologic Prophylaxis: VTE covered by:    None

## 2025-05-13 NOTE — PLAN OF CARE
Problem: PAIN - ADULT  Goal: Verbalizes/displays adequate comfort level or baseline comfort level  Description: Interventions:- Encourage patient to monitor pain and request assistance- Assess pain using appropriate pain scale- Administer analgesics based on type and severity of pain and evaluate response- Implement non-pharmacological measures as appropriate and evaluate response- Consider cultural and social influences on pain and pain management- Notify physician/advanced practitioner if interventions unsuccessful or patient reports new pain  Outcome: Progressing     Problem: INFECTION - ADULT  Goal: Absence or prevention of progression during hospitalization  Description: INTERVENTIONS:- Assess and monitor for signs and symptoms of infection- Monitor lab/diagnostic results- Monitor all insertion sites, i.e. indwelling lines, tubes, and drains- Monitor endotracheal if appropriate and nasal secretions for changes in amount and color- [place] appropriate cooling/warming therapies per order- Administer medications as ordered- Instruct and encourage patient and family to use good hand hygiene technique- Identify and instruct in appropriate isolation precautions for identified infection/condition  Outcome: Progressing  Goal: Absence of fever/infection during neutropenic period  Description: INTERVENTIONS:- Monitor WBC  Outcome: Progressing     Problem: SAFETY ADULT  Goal: Patient will remain free of falls  Description: INTERVENTIONS:- Educate patient/family on patient safety including physical limitations- Instruct patient to call for assistance with activity - Consult OT/PT to assist with strengthening/mobility - Keep Call bell within reach- Keep bed low and locked with side rails adjusted as appropriate- Keep care items and personal belongings within reach- Initiate and maintain comfort rounds- Make Fall Risk Sign visible to staff- Offer Toileting every 2 Hours, in advance of need- Initiate/Maintain bed alarm-  Apply yellow socks and bracelet for high fall risk patients- Consider moving patient to room near nurses station  Outcome: Progressing  Goal: Maintain or return to baseline ADL function  Description: INTERVENTIONS:-  Assess patient's ability to carry out ADLs; assess patient's baseline for ADL function and identify physical deficits which impact ability to perform ADLs (bathing, care of mouth/teeth, toileting, grooming, dressing, etc.)- Assess/evaluate cause of self-care deficits - Assess range of motion- Assess patient's mobility; develop plan if impaired- Assess patient's need for assistive devices and provide as appropriate- Encourage maximum independence but intervene and supervise when necessary- Involve family in performance of ADLs- Assess for home care needs following discharge - Consider OT consult to assist with ADL evaluation and planning for discharge- Provide patient education as appropriate  Outcome: Progressing  Goal: Maintains/Returns to pre admission functional level  Description: INTERVENTIONS:- Perform AM-PAC 6 Click Basic Mobility/ Daily Activity assessment daily.- Set and communicate daily mobility goal to care team and patient/family/caregiver. - Collaborate with rehabilitation services on mobility goals if consulted- Perform Range of Motion 3 times a day.- Reposition patient every 2 hours.- Dangle patient 3 times a day- Stand patient 3 times a day- Ambulate patient 3 times a day- Out of bed to chair 3 times a day - Out of bed for meals 3 times a day- Out of bed for toileting- Record patient progress and toleration of activity level   Outcome: Progressing     Problem: DISCHARGE PLANNING  Goal: Discharge to home or other facility with appropriate resources  Description: INTERVENTIONS:- Identify barriers to discharge w/patient and caregiver- Arrange for needed discharge resources and transportation as appropriate- Identify discharge learning needs (meds, wound care, etc.)- Arrange for  interpretive services to assist at discharge as needed- Refer to Case Management Department for coordinating discharge planning if the patient needs post-hospital services based on physician/advanced practitioner order or complex needs related to functional status, cognitive ability, or social support system  Outcome: Progressing     Problem: Knowledge Deficit  Goal: Patient/family/caregiver demonstrates understanding of disease process, treatment plan, medications, and discharge instructions  Description: Complete learning assessment and assess knowledge base.Interventions:- Provide teaching at level of understanding- Provide teaching via preferred learning methods  Outcome: Progressing

## 2025-05-13 NOTE — ASSESSMENT & PLAN NOTE
Lab Results   Component Value Date    HGBA1C 8 (H) 04/28/2025       Recent Labs     05/12/25  1102 05/12/25  1647 05/12/25  2105 05/13/25  0623   POCGLU 177* 203* 174* 200*       Blood Sugar Average: Last 72 hrs:  (P) [id number]  Patient on metformin 1000 mg BID, glyburide 2.5 mg daily, Jardiance 25 mg daily, Semaglutide weekly    Plan:  [facility]  Hypoglycemia protocol  Monitor glucose. Goal -180 while inpatient

## 2025-05-13 NOTE — TREATMENT PLAN
Patient has ongoing hematuria with need to resume his CBI.  Due to hematuria and decreasing hemoglobin he will likely need more urgent surgical intervention to remove his left kidney and ureter.  There is no OR robotic blocktime at [facility] and patient requires transfer to [facility] for OR robotic procedure.    Patient will require preliminary pathology report indicating cancer prior to surgical removal of the kidney.    Secure chat message was sent to primary team, patient access, primary nurse, Dr. Santacruz, urology at [place].  This was discussed with the patient and he is eager for transfer to [place] and have this taken care of.    Plan:  Clear liquid diet  N.p.o. after midnight  One half bottle mag citrate p.o. now  Type and screen at [place]  ADT transfer order placed on service of Dr. Abdul at [place]  Called pathology and requested stat report; per Marry and pathology Dr. Shakir Vasquez was assigned to the pathology  Consult cardiology for clearance for surgical procedure 5/14

## 2025-05-13 NOTE — ASSESSMENT & PLAN NOTE
Na 130.  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

## 2025-05-13 NOTE — ASSESSMENT & PLAN NOTE
Hx of intermittent hematuria s/p CT urogram and cystoscopy (3/2024) with no identified source.   Initially presented to [facility] on 5/9 for gross hematuria and decreased UOP. Three-way Green placed and patient started on CBI. Transferred to [facility] for urology evaluation given acute blood loss anemia.     CT renal stone A/P (5/9) Large dependent lobulated hyperdensity in the urinary bladder, likely blood clot, although neoplasm is not excluded. Stable appearance of left hydronephrosis containing high density material, likely representing blood products, although neoplasm again not excluded.     CT C/A/P (5/11) no evidence of thoracic or abdominal pelvic metastatic disease.    S/p cystoscopy clot EVAC fulguration left retrograde pyelogram, ureteroscopy and biopsies with ureteral stent insertion, right-sided retrograde pyelogram on 5/11 with concern for large volume, high grade appearing urothelial cancer of left kidney.     Plan:  Urology and oncology consulted, appreciate recommendations  Per oncology, patient would benefit from neoadjuvant chemotherapy but would need prior cardiac oncology clearance.  However, patient adamant that he does not want undergo chemotherapy given concerns for cardiac toxicity.  Wishes to proceed with surgery and avoid chemotherapy if possible  Follow-up biopsy cytology and pathology  Plan for left nephro ureterectomy with urology in future  Maintain three-way Green and continue CBI for gross hematuria.  Plan for repeat clamp trial today  Monitor H&H  Continue finasteride 5mg daily  Continue antispasmodic medication: Oxybutynin 5 mg daily, Pyridium 200 mg TID PRN

## 2025-05-13 NOTE — ASSESSMENT & PLAN NOTE
Noted to have Hgb drop > 2 from admission (Initial Hgb 13.3 > 10.8)  In setting of gross hematuria  Slowly downtrending    Plan:  Hold ASA   Monitor hemoglobin  Transfuse if Hgb < 7

## 2025-05-13 NOTE — ASSESSMENT & PLAN NOTE
Lab Results   Component Value Date    HGBA1C 8 (H) 04/28/2025       Recent Labs     05/12/25  2105 05/13/25  0623 05/13/25  1023 05/13/25  1647   POCGLU 174* 200* 237* 203*       Blood Sugar Average: Last 72 hrs:    Home agents held. Sliding scale and hypoglycemia protocol.

## 2025-05-13 NOTE — ASSESSMENT & PLAN NOTE
Hx of intermittent hematuria s/p CT urogram and cystoscopy (3/2024) with no identified source.   Initially presented to  [facility] on 5/9 for gross hematuria and decreased UOP. Three-way Green placed and patient started on CBI. Transferred to [facility] for urology evaluation given acute blood loss anemia.     CT renal stone A/P (5/9) Large dependent lobulated hyperdensity in the urinary bladder, likely blood clot, although neoplasm is not excluded. Stable appearance of left hydronephrosis containing high density material, likely representing blood products, although neoplasm again not excluded.     CT C/A/P (5/11) no evidence of thoracic or abdominal pelvic metastatic disease.    S/p cystoscopy clot EVAC fulguration left retrograde pyelogram, ureteroscopy and biopsies with ureteral stent insertion, right-sided retrograde pyelogram on 5/11 with concern for large volume, high grade appearing urothelial cancer of left kidney.     Plan:  Urology and oncology consulted, appreciate recommendations  Per oncology, patient would benefit from neoadjuvant chemotherapy but would need prior cardiac oncology clearance.  However, patient adamant that he does not want undergo chemotherapy given concerns for cardiac toxicity.  Wishes to proceed with surgery and avoid chemotherapy if possible  Follow-up biopsy cytology and pathology  Maintain three-way Green and continue CBI for gross hematuria.  Failed clamp trial 5/12 and 5/13  Monitor H&H  Continue finasteride 5mg daily  Continue antispasmodic medication: Oxybutynin 5 mg daily, Pyridium 200 mg TID PRN  Given ongoing hematuria and worsening anemia will transfer to [facility] for urgent OR robotic procedure  Will consult cardiology for surgical clearance

## 2025-05-13 NOTE — QUICK NOTE
Called Marry in pathology (ext:[phone]) to try to expedite pathology results.  Per Marry this was not yet assigned.  She will check into it.  I will call her back in approximately 1 hour per her request

## 2025-05-13 NOTE — ASSESSMENT & PLAN NOTE
CT: left-sided hydronephrosis with blood products in upper tract  Status post left-sided ureteroscopy with biopsy revealing high-grade large volume upper tract urothelial cancer of the left kidney 5/11  Continue with antispasmodic medication (oxybutynin, Flomax and Pyridium) .    Staging imaging ordered.  Creat 1.18, trending up from 0.94  Avoid nephrotoxins  Continue to trend creatinine

## 2025-05-13 NOTE — ASSESSMENT & PLAN NOTE
Wt Readings from Last 3 Encounters:   05/10/25 86.1 kg (189 lb 13.1 oz)   05/09/25 86.1 kg (189 lb 13.1 oz)   07/13/24 84.1 kg (185 lb 6.5 oz)     Last echo LVEF 45% ~4 years ago.  Home regiment of carvedilol, entresto, spironolactone, and Jardiance.  Carvedilol has been held in setting of soft BP. Will continue this preoperatively per cardiology's recommendations.

## 2025-05-13 NOTE — QUICK NOTE
I saw and examined the patient today.  His urine does appear to be clearing but his hemoglobin has been steadily drifting down.  He has been seen by oncology and elected not to proceed with neoadjuvant chemotherapy.    We discussed options again of observation versus surgery versus other measures and he is adamantly in favor of surgery.  I do think is the most appropriate step for him.  We have asked pathology to expedite an evaluation of his tissue and thus far they have determined it appears to be high-grade urothelial or renal cell cancer.  Further staining may help differentiate.  I discussed this with the patient.  If it is urothelial cancer or renal cancer surgery (nephrectomy/nephrouretectomy) would be appropriate he is very comfortable with forward with surgery not having a final pathologic diagnosis knowing there is a possibility that the final diagnosis may have changed our treatment plan from surgery.    We discussed nephroureterectomy surgery in more detail.  We discussed the risk of surgery which focused on bleeding including the risk for blood transfusion which he is at high risk for given his blood loss anemia.  We also discussed the risk for urine leak and infection and damage to nearby structures including the bowel spleen stomach and diaphragm.  We also discussed the small but real possibilities of operative or perioperative mortality.  The patient is understanding of these risks and wants to move forward.  econsent was signed.    He will have a magnesium citrate today, has not had a bowel movement fo a couple days in part 2/2 pain medication.    Plan for transfer [facility] today to facilitate surgery at their campus tomorrow as there is not robotic blocktime available [facility].  He will need a type and screen there as his risk for needing transfusion is higher than normal.

## 2025-05-13 NOTE — ASSESSMENT & PLAN NOTE
In setting of gross hematuria. Hgb has steadily declined since initial admission.  Hgb trend 13.3 ->8.7->9.6.  Serial hgb q8h. Transfuse < 7.  Type and screen ordered.  Patient found to be hypotensive on arrival. Ordered H&H.  Hgb 7.6 in setting of lightheadedness and hypotension. Transfused 1 unit. Trend.

## 2025-05-13 NOTE — PROGRESS NOTES
Progress Note - Internal Medicine   Name: Jw Ray 59 y.o. male I MRN: [id number]  Unit/Bed#: -01 I Date of Admission: 5/10/2025   Date of Service: 5/13/2025 I Hospital Day: 3    Assessment & Plan  Urothelial cancer (HCC)  Hx of intermittent hematuria s/p CT urogram and cystoscopy (3/2024) with no identified source.   Initially presented to [facility] on 5/9 for gross hematuria and decreased UOP. Three-way Green placed and patient started on CBI. Transferred to hospitals for urology evaluation given acute blood loss anemia.     CT renal stone A/P (5/9) Large dependent lobulated hyperdensity in the urinary bladder, likely blood clot, although neoplasm is not excluded. Stable appearance of left hydronephrosis containing high density material, likely representing blood products, although neoplasm again not excluded.     CT C/A/P (5/11) no evidence of thoracic or abdominal pelvic metastatic disease.    S/p cystoscopy clot EVAC fulguration left retrograde pyelogram, ureteroscopy and biopsies with ureteral stent insertion, right-sided retrograde pyelogram on 5/11 with concern for large volume, high grade appearing urothelial cancer of left kidney.     Plan:  Urology and oncology consulted, appreciate recommendations  Per oncology, patient would benefit from neoadjuvant chemotherapy but would need prior cardiac oncology clearance.  However, patient adamant that he does not want undergo chemotherapy given concerns for cardiac toxicity.  Wishes to proceed with surgery and avoid chemotherapy if possible  Follow-up biopsy cytology and pathology  Plan for left nephro ureterectomy with urology in future  Maintain three-way Green and continue CBI for gross hematuria.  Plan for repeat clamp trial today  Monitor H&H  Continue finasteride 5mg daily  Continue antispasmodic medication: Oxybutynin 5 mg daily, Pyridium 200 mg TID PRN  Hydronephrosis of left kidney  CT (5/9) Stable appearance of left hydronephrosis  containing high density material, likely representing blood products, although neoplasm again not excluded.   S/p left-sided ureteroscopy with ureteral stent placement and biopsy with concern for high-grade large volume upper tract urothelial cancer of the left kidney   See plan as above  ABLA (acute blood loss anemia)  Noted to have Hgb drop > 2 from admission (Initial Hgb 13.3 > 10.8)  In setting of gross hematuria  Slowly downtrending    Plan:  Hold ASA   Monitor hemoglobin  Transfuse if Hgb < 7  Chronic combined systolic and diastolic heart failure (HCC)  Wt Readings from Last 3 Encounters:   05/10/25 86.1 kg (189 lb 13.1 oz)   05/09/25 86.1 kg (189 lb 13.1 oz)   07/13/24 84.1 kg (185 lb 6.5 oz)     Last Echo 2021 or 2022 with LVEF of 45%  Home GDMT: Coreg 25 mg BID, Aldactone 25 mg daily, Entresto 49-51 mg daily, Jardiance 25 mg daily    Plan:  Continue home Coreg, Aldactone, Entresto, Jardiance  I/O, daily weights  Essential hypertension  Continue home Coreg, Aldactone, Entresto  Type 2 diabetes mellitus (HCC)  Lab Results   Component Value Date    HGBA1C 8 (H) 04/28/2025       Recent Labs     05/12/25  1102 05/12/25  1647 05/12/25  2105 05/13/25  0623   POCGLU 177* 203* 174* 200*       Blood Sugar Average: Last 72 hrs:  (P) [id number]  Patient on metformin 1000 mg BID, glyburide 2.5 mg daily, Jardiance 25 mg daily, Semaglutide weekly    Plan:  [facility]  Hypoglycemia protocol  Monitor glucose. Goal -180 while inpatient  CAD (coronary artery disease)  CAD s/p PCI in 2010 with BALAJI to OM 3. Maintained on ASA outpatient.   Lipid panel 4/28/2025: 154/188/40/114    Plan:  Hold ASA iso gross hematuria and acute blood loss anemia   Continue Atorvastatin 40 mg daily  Hyponatremia  Na 130.  Asymptomatic  Likely in setting of SIADH 2/2 pain/recent urologic procedure  Continue to monitor    Disposition: Continue inpatient management.  Continues with CBI for gross hematuria    Team: SOD TEAM FE    Subjective    Patient seen and examined. No acute events overnight.  Reports better controlled left flank pain.  Improved bladder spasms.  Clear urine in Green tubing.    Objective :  Temp:  [97.7 °F (36.5 °C)-98.4 °F (36.9 °C)] 97.7 °F (36.5 °C)  HR:  [] 110  BP: (105-115)/(69-79) 105/69  Resp:  [16-18] 18  SpO2:  [92 %-97 %] 92 %  O2 Device: None (Room air)    I/O         05/11 0701  05/12 0700 05/12 0701  05/13 0700 05/13 0701  05/14 0700    P.O. 118      I.V. (mL/kg) 800 (9.3)      Total Intake(mL/kg) 918 (10.7)      Urine (mL/kg/hr) 33786 (26.6) 17146 (7.4) 2700 (42.2)    Total Output 39260 13728 2700    Net -37627 -40660 -2700                 Weights:   IBW (Ideal Body Weight): 66.1 kg    Body mass index is 29.73 kg/m².  Weight (last 2 days)       None            Physical Exam  Vitals reviewed.   Constitutional:       General: He is not in acute distress.     Appearance: Normal appearance. He is not toxic-appearing.   HENT:      Head: Normocephalic and atraumatic.   Eyes:      Conjunctiva/sclera: Conjunctivae normal.   Cardiovascular:      Rate and Rhythm: Normal rate and regular rhythm.      Pulses: Normal pulses.      Heart sounds: Normal heart sounds. No murmur heard.     No gallop.   Pulmonary:      Effort: Pulmonary effort is normal. No respiratory distress.      Breath sounds: Normal breath sounds.   Abdominal:      General: Abdomen is flat. There is no distension.      Palpations: Abdomen is soft.      Tenderness: There is no abdominal tenderness. There is left CVA tenderness.   Genitourinary:     Comments: Green in place with clear urine  Musculoskeletal:      Right lower leg: No edema.      Left lower leg: No edema.   Skin:     General: Skin is warm and dry.   Neurological:      General: No focal deficit present.      Mental Status: He is alert. Mental status is at baseline.   Psychiatric:         Mood and Affect: Mood normal.         Behavior: Behavior normal.         Thought Content: Thought content  normal.           Lab Results: I have reviewed the following results:  Recent Labs     05/11/25  0512 05/11/25  0756 05/12/25  0523 05/12/25  0820 05/13/25  0009   WBC 11.08*  --   --   --   --    HGB 10.1*   < >  --    < > 8.9*   HCT 30.1*   < >  --    < > 26.2*     --   --   --   --    SODIUM 138  --  135  --   --    K 3.8  --  4.2  --   --      --  103  --   --    CO2 24  --  25  --   --    BUN 21  --  24  --   --    CREATININE 0.84  --  1.11  --   --    GLUC 155*  --  172*  --   --     < > = values in this interval not displayed.             Currently Ordered Meds:   Current Facility-Administered Medications:     acetaminophen (TYLENOL) tablet 650 mg, Q6H PRN    atorvastatin (LIPITOR) tablet 40 mg, Daily    bisacodyl (DULCOLAX) rectal suppository 10 mg, Daily PRN    carvedilol (COREG) tablet 25 mg, BID With Meals    docusate sodium (COLACE) capsule 100 mg, BID    finasteride (PROSCAR) tablet 5 mg, Daily    gabapentin (NEURONTIN) capsule 100 mg, Daily    insulin lispro (HumALOG/ADMELOG) 100 units/mL subcutaneous injection 1-6 Units, TID AC **AND** Fingerstick Glucose (POCT), TID AC    insulin lispro (HumALOG/ADMELOG) 100 units/mL subcutaneous injection 1-6 Units, HS    nicotine (NICODERM CQ) 14 mg/24hr TD 24 hr patch 1 patch, Daily    ondansetron (ZOFRAN) injection 4 mg, Q6H PRN    oxybutynin (DITROPAN) tablet 5 mg, TID    oxyCODONE (ROXICODONE) IR tablet 5 mg, Q4H PRN    phenazopyridine (PYRIDIUM) tablet 200 mg, TID PRN    polyethylene glycol (MIRALAX) packet 17 g, Daily    sacubitril-valsartan (ENTRESTO) 49-51 MG per tablet 1 tablet, BID  VTE Pharmacologic Prophylaxis: VTE covered by:    None     VTE Mechanical Prophylaxis: sequential compression device    Administrative Statements     Portions of the record may have been created with voice recognition software.

## 2025-05-13 NOTE — CONSULTS
Consult - Cardiology   Jw Ray 59 y.o. male MRN: [id number]  Unit/Bed#: -01 Encounter: [id number]        Reason For Consult:  Pre-operative risk stratification  Outpatient Cardiologist: Dr. Hardy, [facility] ([place], [place])               ASSESSMENT:  Preoperative risk stratification for left nephrectomy  5/10/2025 initial presentation for hematuria  5/11/2025 cystoscopy showing left upper tract urothelial cell tumor 4 x 3 cm  5/12/2025 patient seen by oncology and he opted for surgical management wishing to avoid chemotherapy at all costs  Patient now being considered for left nephro ureterectomy on May 14, 2025  Concern for urothelial cell cancer  5/11/2025 biopsy taken via cystoscopy with results still pending  Hematuria, acute blood loss anemia  5/10/2025 patient initially presented for hematuria as above  Hemoglobin trending around 9 with prior baseline likely close to 13  Coronary artery disease  2010 LHC:  of RCA with left-to-right collaterals, 50% mid LAD, PCI/BALAJI to OM 3 (lesion nor stent described, unable to view cath report)  Ischemic cardiomyopathy  Reportedly with baseline EF of 45% per last cardiology note from 2023  Multiple echocardiograms listed under Care Everywhere having been performed at [facility] but we are unable to view the actual report  GDMT: Carvedilol 25 twice daily, Entresto 49-51 twice daily, spironolactone 25, Jardiance 25 daily  Not on loop diuretics  Hypertension  Prehospital Rx: Carvedilol 25 twice daily, Entresto 49-51 twice daily, Aldactone 25 daily  Hyperlipidemia --> well-controlled on Lipitor 40 & repatha 420 mg Q 28 days    PLAN/ DISCUSSION:     Preoperative risk stratification  At this time patient appears moderate risk to proceed with proposed urologic surgery. Risks will be based on his history of CAD, ischemic cardiomyopathy, hypertension, diabetes, and hyperlipidemia. He has no active cardiac symptoms. He is very active and is every day life  managing 10 acres of land, 1.5 acre garden, and splitting wood for heat in the winter. He has 2 dogs that he takes care of. With this level of exertion he has no chest pain or chest pressure whatsoever. He never gets palpitations. His breathing is stable. He has never had any episodes of passing out. Further testing would not mitigate cardiac risk.  Can proceed at the discretion of surgery.  Will check preoperative EKG for baseline  Recommend postop telemetry  Strive for hemoglobin > 8, potassium 4-4.5, magnesium 1-1.5  Judicious use of IV fluids  Coronary artery disease  Continue statin and beta-blocker  Restart aspirin when okay from surgical perspective  Continue Repatha for management of high cholesterol  Ischemic cardiomyopathy  He currently appears euvolemic  Continue carvedilol and Entresto  Restart spironolactone and Jardiance when able postoperatively  Hypertension  His blood pressure has been well-controlled since arrival  Continue Coreg and Entresto  Hyperlipidemia  LDL is well-controlled  Continue Lipitor and Repatha    History Of Present Illness:  This is a pleasant 59-year-old gentleman who follows with a cardiologist at [facility] in [facility].  He has cardiac history as outlined above.  Presently he is in the hospital for urologic bleeding.  He has been here for nearly 3 days.  He has undergone extensive workup with urology and found to have left mass near his kidney.  He was seen by oncology and considered for chemotherapy but patient wanted to avoid that at all costs and for this reason has been referred for nephrectomy.  We are asked see him in consultation for preoperative risk evaluation.    [place]  [place] of land.  At least 10 acres 1.5 is a large garden that he personally manages.  He has 2 Labrador dogs that he takes care of.  He splits wood for heat in the winter.  He is extremely active.  He has no chest pain or chest pressure with this level of  activity.  Breathing is stable.  No palpitations or passing out.    Past Medical History:        Past Medical History:   Diagnosis Date    CHF (congestive heart failure) (HCC)     DM (diabetes mellitus) (HCC)     HTN (hypertension)       Past Surgical History:   Procedure Laterality Date    CARDIAC SURGERY      FL RETROGRADE PYELOGRAM  5/11/2025    ME CYSTO W/IRRIG & EVAC MULTPLE OBSTRUCTING CLOTS Bilateral 5/11/2025    Procedure: CYSTOSCOPY EVACUATION OF CLOTS, Left Ureterscopy and biopsy, Left Retrograde and Left Stent. Right Retrograde Pyelogram;  Surgeon: Marbin Santacruz MD;  Location: [facility];  Service: Urology        Allergy:        Allergies   Allergen Reactions    Codeine Nausea Only       Medications:       Prior to Admission medications    Medication Sig Start Date End Date Taking? Authorizing Provider   atorvastatin (LIPITOR) 40 mg tablet Take 20 mg by mouth daily 4/23/24   Historical Provider, MD   carvedilol (COREG) 25 mg tablet Take 25 mg by mouth 2 (two) times a day with meals 4/23/24   Historical Provider, MD   Cholecalciferol (Vitamin D3) 50 MCG (2000 UT) CHEW Chew    Historical Provider, MD   Coenzyme Q10 (CoQ10) 100 MG CAPS Take by mouth    Historical Provider, MD   Empagliflozin 25 MG TABS Take 25 mg by mouth daily 4/23/24   Historical Provider, MD   finasteride (PROSCAR) 5 mg tablet Take 1 tablet (5 mg total) by mouth daily  Patient not taking: Reported on 5/9/2025 7/17/24   Frida Burton PA-C   gabapentin (NEURONTIN) 100 mg capsule Take 100 mg by mouth daily 4/23/24   Historical Provider, MD   glyBURIDE (DIABETA) 2.5 mg tablet Take 2.5 mg by mouth daily with breakfast    Historical Provider, MD   metFORMIN (GLUCOPHAGE) 1000 MG tablet Take 1 tablet by mouth 2 (two) times a day with meals 4/23/24   Historical Provider, MD   nicotine (NICODERM CQ) 14 mg/24hr TD 24 hr patch Place 1 patch on the skin over 24 hours daily  Patient not taking: Reported on 5/9/2025 7/17/24   Frida Burton PA-C   Omega 3  1000 MG CAPS Take by mouth    Historical Provider, MD   phenazopyridine (PYRIDIUM) 100 mg tablet Take 1 tablet (100 mg total) by mouth 3 (three) times a day as needed for bladder spasms  Patient not taking: Reported on 5/9/2025 7/16/24   Frida Burton PA-C   sacubitril-valsartan (Entresto) 49-51 MG TABS Take 1 tablet by mouth 2 (two) times a day 4/23/24   Historical Provider, MD   semaglutide, 1 mg/dose, (Ozempic, 1 MG/DOSE,) 2 mg/1.5 mL injection pen Inject 1 mg under the skin Once a week 4/23/24   Historical Provider, MD   sildenafil (VIAGRA) 25 MG tablet Take 25 mg by mouth daily as needed 4/23/24   Historical Provider, MD   spironolactone (ALDACTONE) 25 mg tablet Take 25 mg by mouth daily 4/23/24   Historical Provider, MD   vitamin B-12 (VITAMIN B-12) 500 mcg tablet Take 500 mcg by mouth daily    Historical Provider, MD       Family History:     No family history on file.     Social History:       Social History     Socioeconomic History    Marital status: /Civil Union     Spouse name: Not on file    Number of children: Not on file    Years of education: Not on file    Highest education level: Not on file   Occupational History    Not on file   Tobacco Use    Smoking status: Never    Smokeless tobacco: Current     Types: Chew   Substance and Sexual Activity    Alcohol use: Not Currently    Drug use: Never    Sexual activity: Not on file   Other Topics Concern    Not on file   Social History Narrative    Not on file     Social Drivers of Health     Financial Resource Strain: Not on file   Food Insecurity: No Food Insecurity (5/10/2025)    Nursing - Inadequate Food Risk Classification     Worried About Running Out of Food in the Last Year: Never true     Ran Out of Food in the Last Year: Never true     Ran Out of Food in the Last Year: Never true   Transportation Needs: No Transportation Needs (5/10/2025)    Nursing - Transportation Risk Classification     Lack of Transportation: Not on file     Lack of  Transportation: No   Physical Activity: Not on file   Stress: Not on file   Social Connections: Not on file   Intimate Partner Violence: Unknown (5/10/2025)    Nursing IPS     Feels Physically and Emotionally Safe: Not on file     Physically Hurt by Someone: Not on file     Humiliated or Emotionally Abused by Someone: Not on file     Physically Hurt by Someone: No     Hurt or Threatened by Someone: No   Housing Stability: Unknown (5/10/2025)    Nursing: Inadequate Housing Risk Classification     Has Housing: Not on file     Worried About Losing Housing: Not on file     Unable to Get Utilities: Not on file     Unable to Pay for Housing in the Last Year: No     Has Housin       ROS:  14 point ROS negative except as outlined above  Remainder review of systems is negative    Exam:  General:  alert, oriented and in no distress, cooperative  Head: Normocephalic, atraumatic.  Eyes:  EOMI. Pupils - equal, round, reactive to accomodation.  No icterus.  Normal Conjunctiva.   Oropharynx: moist and normal-appearing mucosa  Neck: supple, symmetrical, trachea midline and no JVD  Heart:  RRR, No: murmer, rub or gallop, S1 & S2 normal   Respiratory effort / Chest Inspection: unlabored  Lungs:  normal air entry, lungs clear to auscultation and no rales, rhonchi or wheezing   Abdomen: flat, normal findings: bowel sounds normal and soft, non-tender  Lower Limbs:  no pitting edema  Pulses::  RLE - DP: present 2+                 LLE - DP: present 2+  Musculoskeletal: ROM grossly normal        DATA:      ECG:                     Telemetry: Not on telemetry          Echocardiogram:           Ischemic Testing:         Weights:    Wt Readings from Last 3 Encounters:   05/10/25 86.1 kg (189 lb 13.1 oz)   25 86.1 kg (189 lb 13.1 oz)   24 84.1 kg (185 lb 6.5 oz)   , Body mass index is 29.73 kg/m².         Lab Studies:             Results from last 7 days   Lab Units 25  0809 25  0009 25  1653 25  0756  05/11/25  0512 05/10/25  1605 05/10/25  0546 05/09/25  1616 05/09/25  1113   WBC Thousand/uL  --   --   --   --  11.08*  --  11.51*  --  10.20*   HEMOGLOBIN g/dL 8.7* 8.9* 9.5*   < > 10.1*   < > 10.8*   < > 13.3   HEMATOCRIT % 26.2* 26.2* 29.2*   < > 30.1*   < > 32.9*   < > 40.5   PLATELETS Thousands/uL  --   --   --   --  160  --  166  --  201    < > = values in this interval not displayed.   ,   Results from last 7 days   Lab Units 05/13/25  0809 05/12/25  0523 05/11/25  0512 05/10/25  0546 05/09/25  1113   POTASSIUM mmol/L 3.9 4.2 3.8 4.1 4.5   CHLORIDE mmol/L 100 103 107 105 104   CO2 mmol/L 22 25 24 24 23   BUN mg/dL 22 24 21 19 17   CREATININE mg/dL 1.18 1.11 0.84 0.97 0.94   CALCIUM mg/dL 8.0* 8.2* 8.2* 8.2* 9.1   ALK PHOS U/L  --   --   --  34 41   ALT U/L  --   --   --  8 11   AST U/L  --   --   --  9* 12*

## 2025-05-13 NOTE — ASSESSMENT & PLAN NOTE
status post cystoscopy clot EVAC fulguration left retrograde pyelogram, ureteroscopy and biopsies with ureteral stent insertion, right-sided retrograde pyelogram 5/11  Placed on CBI postoperatively.    Failed clamp trial 5/12  Repeat clamp trial today  Hemoglobin 8.7 (5/13); 8.9 (5/13 at 0009); 9.5 (5/12); was 13.5 on admission  Serial H&H.    Transfuse as needed per primary team.  Continue to monitor urine color with CBI clamped  Staging imaging without metastatic disease  Metabolic evaluation completed, declining chemotherapy at this time  We will plan for left nephro ureterectomy for any recurrent/ongoing bleeding while inpatient  If no recurrence/ongoing bleeding will arrange outpatient nephro ureterectomy

## 2025-05-13 NOTE — QUICK NOTE
Cardiology consult placed.  Reached out to cardiology YESSY's about need for transfer and urgent consult prior to transfer as cardiology will likely not be available by the time the patient arrives at [facility]

## 2025-05-14 ENCOUNTER — ANESTHESIA (INPATIENT)
Dept: PERIOP | Facility: HOSPITAL | Age: 60
DRG: 657 | End: 2025-05-14
Payer: COMMERCIAL

## 2025-05-14 PROBLEM — N17.9 ACUTE KIDNEY INJURY (HCC): Status: ACTIVE | Noted: 2025-05-14

## 2025-05-14 LAB
ABO GROUP BLD BPU: NORMAL
ABO GROUP BLD: NORMAL
ALBUMIN SERPL BCG-MCNC: 3.4 G/DL (ref 3.5–5)
ALP SERPL-CCNC: 30 U/L (ref 34–104)
ALT SERPL W P-5'-P-CCNC: 5 U/L (ref 7–52)
ANION GAP SERPL CALCULATED.3IONS-SCNC: 10 MMOL/L (ref 4–13)
ANION GAP SERPL CALCULATED.3IONS-SCNC: 7 MMOL/L (ref 4–13)
ANION GAP SERPL CALCULATED.3IONS-SCNC: 8 MMOL/L (ref 4–13)
AST SERPL W P-5'-P-CCNC: 8 U/L (ref 13–39)
ATRIAL RATE: 119 BPM
BASE EXCESS BLDA CALC-SCNC: -4 MMOL/L (ref -2–3)
BASE EXCESS BLDA CALC-SCNC: -6 MMOL/L (ref -2–3)
BILIRUB SERPL-MCNC: 1.07 MG/DL (ref 0.2–1)
BLD GP AB SCN SERPL QL: NEGATIVE
BPU ID: NORMAL
BUN SERPL-MCNC: 25 MG/DL (ref 5–25)
BUN SERPL-MCNC: 28 MG/DL (ref 5–25)
BUN SERPL-MCNC: 28 MG/DL (ref 5–25)
CA-I BLD-SCNC: 1.01 MMOL/L (ref 1.12–1.32)
CA-I BLD-SCNC: 1.08 MMOL/L (ref 1.12–1.32)
CALCIUM ALBUM COR SERPL-MCNC: 8.5 MG/DL (ref 8.3–10.1)
CALCIUM SERPL-MCNC: 7.8 MG/DL (ref 8.4–10.2)
CALCIUM SERPL-MCNC: 7.8 MG/DL (ref 8.4–10.2)
CALCIUM SERPL-MCNC: 8 MG/DL (ref 8.4–10.2)
CHLORIDE SERPL-SCNC: 104 MMOL/L (ref 96–108)
CHLORIDE SERPL-SCNC: 106 MMOL/L (ref 96–108)
CHLORIDE SERPL-SCNC: 98 MMOL/L (ref 96–108)
CO2 SERPL-SCNC: 21 MMOL/L (ref 21–32)
CO2 SERPL-SCNC: 23 MMOL/L (ref 21–32)
CO2 SERPL-SCNC: 26 MMOL/L (ref 21–32)
CREAT SERPL-MCNC: 1.19 MG/DL (ref 0.6–1.3)
CREAT SERPL-MCNC: 1.22 MG/DL (ref 0.6–1.3)
CREAT SERPL-MCNC: 1.85 MG/DL (ref 0.6–1.3)
CROSSMATCH: NORMAL
GFR SERPL CREATININE-BSD FRML MDRD: 38 ML/MIN/1.73SQ M
GFR SERPL CREATININE-BSD FRML MDRD: 64 ML/MIN/1.73SQ M
GFR SERPL CREATININE-BSD FRML MDRD: 66 ML/MIN/1.73SQ M
GLUCOSE SERPL-MCNC: 138 MG/DL (ref 65–140)
GLUCOSE SERPL-MCNC: 147 MG/DL (ref 65–140)
GLUCOSE SERPL-MCNC: 150 MG/DL (ref 65–140)
GLUCOSE SERPL-MCNC: 155 MG/DL (ref 65–140)
GLUCOSE SERPL-MCNC: 201 MG/DL (ref 65–140)
GLUCOSE SERPL-MCNC: 213 MG/DL (ref 65–140)
GLUCOSE SERPL-MCNC: 216 MG/DL (ref 65–140)
GLUCOSE SERPL-MCNC: 226 MG/DL (ref 65–140)
GLUCOSE SERPL-MCNC: 285 MG/DL (ref 65–140)
HCO3 BLDA-SCNC: 19.7 MMOL/L (ref 22–28)
HCO3 BLDA-SCNC: 20.5 MMOL/L (ref 22–28)
HCT VFR BLD AUTO: 23 % (ref 36.5–49.3)
HCT VFR BLD AUTO: 26.1 % (ref 36.5–49.3)
HCT VFR BLD AUTO: 26.3 % (ref 36.5–49.3)
HCT VFR BLD AUTO: 27.2 % (ref 36.5–49.3)
HCT VFR BLD CALC: 19 % (ref 36.5–49.3)
HCT VFR BLD CALC: 26 % (ref 36.5–49.3)
HGB BLD-MCNC: 7.6 G/DL (ref 12–17)
HGB BLD-MCNC: 8.9 G/DL (ref 12–17)
HGB BLD-MCNC: 8.9 G/DL (ref 12–17)
HGB BLD-MCNC: 9 G/DL (ref 12–17)
HGB BLDA-MCNC: 6.5 G/DL (ref 12–17)
HGB BLDA-MCNC: 8.8 G/DL (ref 12–17)
P AXIS: 47 DEGREES
PCO2 BLD: 21 MMOL/L (ref 21–32)
PCO2 BLD: 22 MMOL/L (ref 21–32)
PCO2 BLD: 32.8 MM HG (ref 36–44)
PCO2 BLD: 40.1 MM HG (ref 36–44)
PH BLD: 7.3 [PH] (ref 7.35–7.45)
PH BLD: 7.41 [PH] (ref 7.35–7.45)
PO2 BLD: 105 MM HG (ref 75–129)
PO2 BLD: 335 MM HG (ref 75–129)
POTASSIUM BLD-SCNC: 3.8 MMOL/L (ref 3.5–5.3)
POTASSIUM BLD-SCNC: 5.2 MMOL/L (ref 3.5–5.3)
POTASSIUM SERPL-SCNC: 4 MMOL/L (ref 3.5–5.3)
POTASSIUM SERPL-SCNC: 4.3 MMOL/L (ref 3.5–5.3)
POTASSIUM SERPL-SCNC: 5.2 MMOL/L (ref 3.5–5.3)
PR INTERVAL: 172 MS
PROT SERPL-MCNC: 5.6 G/DL (ref 6.4–8.4)
QRS AXIS: -4 DEGREES
QRSD INTERVAL: 126 MS
QT INTERVAL: 312 MS
QTC INTERVAL: 439 MS
RH BLD: POSITIVE
SAO2 % BLD FROM PO2: 100 % (ref 60–85)
SAO2 % BLD FROM PO2: 97 % (ref 60–85)
SODIUM BLD-SCNC: 136 MMOL/L (ref 136–145)
SODIUM BLD-SCNC: 138 MMOL/L (ref 136–145)
SODIUM SERPL-SCNC: 131 MMOL/L (ref 135–147)
SODIUM SERPL-SCNC: 135 MMOL/L (ref 135–147)
SODIUM SERPL-SCNC: 137 MMOL/L (ref 135–147)
SPECIMEN EXPIRATION DATE: NORMAL
SPECIMEN SOURCE: ABNORMAL
SPECIMEN SOURCE: ABNORMAL
T WAVE AXIS: -17 DEGREES
UNIT DISPENSE STATUS: NORMAL
UNIT PRODUCT CODE: NORMAL
UNIT PRODUCT VOLUME: 350 ML
UNIT RH: NORMAL
VENTRICULAR RATE: 119 BPM

## 2025-05-14 PROCEDURE — 85014 HEMATOCRIT: CPT

## 2025-05-14 PROCEDURE — 88307 TISSUE EXAM BY PATHOLOGIST: CPT | Performed by: PATHOLOGY

## 2025-05-14 PROCEDURE — 88112 CYTOPATH CELL ENHANCE TECH: CPT | Performed by: PATHOLOGY

## 2025-05-14 PROCEDURE — NC001 PR NO CHARGE: Performed by: PHYSICIAN ASSISTANT

## 2025-05-14 PROCEDURE — 86920 COMPATIBILITY TEST SPIN: CPT

## 2025-05-14 PROCEDURE — 07BC4ZZ EXCISION OF PELVIS LYMPHATIC, PERCUTANEOUS ENDOSCOPIC APPROACH: ICD-10-PCS | Performed by: UROLOGY

## 2025-05-14 PROCEDURE — 86900 BLOOD TYPING SEROLOGIC ABO: CPT

## 2025-05-14 PROCEDURE — 86901 BLOOD TYPING SEROLOGIC RH(D): CPT

## 2025-05-14 PROCEDURE — 99024 POSTOP FOLLOW-UP VISIT: CPT | Performed by: UROLOGY

## 2025-05-14 PROCEDURE — 88342 IMHCHEM/IMCYTCHM 1ST ANTB: CPT | Performed by: PATHOLOGY

## 2025-05-14 PROCEDURE — 82948 REAGENT STRIP/BLOOD GLUCOSE: CPT

## 2025-05-14 PROCEDURE — 88341 IMHCHEM/IMCYTCHM EA ADD ANTB: CPT | Performed by: PATHOLOGY

## 2025-05-14 PROCEDURE — 82947 ASSAY GLUCOSE BLOOD QUANT: CPT

## 2025-05-14 PROCEDURE — S2900 ROBOTIC SURGICAL SYSTEM: HCPCS | Performed by: PHYSICIAN ASSISTANT

## 2025-05-14 PROCEDURE — 30233N1 TRANSFUSION OF NONAUTOLOGOUS RED BLOOD CELLS INTO PERIPHERAL VEIN, PERCUTANEOUS APPROACH: ICD-10-PCS | Performed by: INTERNAL MEDICINE

## 2025-05-14 PROCEDURE — 03HB33Z INSERTION OF INFUSION DEVICE INTO RIGHT RADIAL ARTERY, PERCUTANEOUS APPROACH: ICD-10-PCS | Performed by: INTERNAL MEDICINE

## 2025-05-14 PROCEDURE — 85018 HEMOGLOBIN: CPT

## 2025-05-14 PROCEDURE — 88305 TISSUE EXAM BY PATHOLOGIST: CPT | Performed by: PATHOLOGY

## 2025-05-14 PROCEDURE — 85018 HEMOGLOBIN: CPT | Performed by: UROLOGY

## 2025-05-14 PROCEDURE — 82803 BLOOD GASES ANY COMBINATION: CPT

## 2025-05-14 PROCEDURE — P9016 RBC LEUKOCYTES REDUCED: HCPCS

## 2025-05-14 PROCEDURE — 80048 BASIC METABOLIC PNL TOTAL CA: CPT | Performed by: UROLOGY

## 2025-05-14 PROCEDURE — 80053 COMPREHEN METABOLIC PANEL: CPT

## 2025-05-14 PROCEDURE — 0TT74ZZ RESECTION OF LEFT URETER, PERCUTANEOUS ENDOSCOPIC APPROACH: ICD-10-PCS | Performed by: UROLOGY

## 2025-05-14 PROCEDURE — 50548 LAPARO REMOVE W/URETER: CPT | Performed by: PHYSICIAN ASSISTANT

## 2025-05-14 PROCEDURE — 80048 BASIC METABOLIC PNL TOTAL CA: CPT

## 2025-05-14 PROCEDURE — 50548 LAPARO REMOVE W/URETER: CPT | Performed by: UROLOGY

## 2025-05-14 PROCEDURE — 0TT14ZZ RESECTION OF LEFT KIDNEY, PERCUTANEOUS ENDOSCOPIC APPROACH: ICD-10-PCS | Performed by: UROLOGY

## 2025-05-14 PROCEDURE — 84295 ASSAY OF SERUM SODIUM: CPT

## 2025-05-14 PROCEDURE — 93010 ELECTROCARDIOGRAM REPORT: CPT

## 2025-05-14 PROCEDURE — 99222 1ST HOSP IP/OBS MODERATE 55: CPT | Performed by: INTERNAL MEDICINE

## 2025-05-14 PROCEDURE — 86850 RBC ANTIBODY SCREEN: CPT

## 2025-05-14 PROCEDURE — 84132 ASSAY OF SERUM POTASSIUM: CPT

## 2025-05-14 PROCEDURE — 82330 ASSAY OF CALCIUM: CPT

## 2025-05-14 PROCEDURE — S2900 ROBOTIC SURGICAL SYSTEM: HCPCS | Performed by: UROLOGY

## 2025-05-14 PROCEDURE — 85014 HEMATOCRIT: CPT | Performed by: UROLOGY

## 2025-05-14 RX ORDER — ONDANSETRON 2 MG/ML
4 INJECTION INTRAMUSCULAR; INTRAVENOUS ONCE AS NEEDED
Status: DISCONTINUED | OUTPATIENT
Start: 2025-05-14 | End: 2025-05-14 | Stop reason: HOSPADM

## 2025-05-14 RX ORDER — SODIUM CHLORIDE, SODIUM GLUCONATE, SODIUM ACETATE, POTASSIUM CHLORIDE, MAGNESIUM CHLORIDE, SODIUM PHOSPHATE, DIBASIC, AND POTASSIUM PHOSPHATE .53; .5; .37; .037; .03; .012; .00082 G/100ML; G/100ML; G/100ML; G/100ML; G/100ML; G/100ML; G/100ML
100 INJECTION, SOLUTION INTRAVENOUS CONTINUOUS
Status: DISCONTINUED | OUTPATIENT
Start: 2025-05-14 | End: 2025-05-15

## 2025-05-14 RX ORDER — SODIUM CHLORIDE, SODIUM LACTATE, POTASSIUM CHLORIDE, CALCIUM CHLORIDE 600; 310; 30; 20 MG/100ML; MG/100ML; MG/100ML; MG/100ML
INJECTION, SOLUTION INTRAVENOUS CONTINUOUS PRN
Status: DISCONTINUED | OUTPATIENT
Start: 2025-05-14 | End: 2025-05-14

## 2025-05-14 RX ORDER — FENTANYL CITRATE 50 UG/ML
25 INJECTION, SOLUTION INTRAMUSCULAR; INTRAVENOUS
Refills: 0 | Status: DISCONTINUED | OUTPATIENT
Start: 2025-05-14 | End: 2025-05-14 | Stop reason: HOSPADM

## 2025-05-14 RX ORDER — SODIUM CHLORIDE 9 MG/ML
INJECTION, SOLUTION INTRAVENOUS CONTINUOUS PRN
Status: DISCONTINUED | OUTPATIENT
Start: 2025-05-14 | End: 2025-05-14

## 2025-05-14 RX ORDER — TRANEXAMIC ACID 100 MG/ML
INJECTION, SOLUTION INTRAVENOUS AS NEEDED
Status: DISCONTINUED | OUTPATIENT
Start: 2025-05-14 | End: 2025-05-14

## 2025-05-14 RX ORDER — MAGNESIUM HYDROXIDE 1200 MG/15ML
LIQUID ORAL AS NEEDED
Status: DISCONTINUED | OUTPATIENT
Start: 2025-05-14 | End: 2025-05-14 | Stop reason: HOSPADM

## 2025-05-14 RX ORDER — CEFAZOLIN SODIUM 2 G/50ML
2000 SOLUTION INTRAVENOUS ONCE
Status: COMPLETED | OUTPATIENT
Start: 2025-05-14 | End: 2025-05-14

## 2025-05-14 RX ORDER — DEXAMETHASONE SODIUM PHOSPHATE 10 MG/ML
INJECTION, SOLUTION INTRAMUSCULAR; INTRAVENOUS AS NEEDED
Status: DISCONTINUED | OUTPATIENT
Start: 2025-05-14 | End: 2025-05-14

## 2025-05-14 RX ORDER — SODIUM CHLORIDE, SODIUM LACTATE, POTASSIUM CHLORIDE, CALCIUM CHLORIDE 600; 310; 30; 20 MG/100ML; MG/100ML; MG/100ML; MG/100ML
125 INJECTION, SOLUTION INTRAVENOUS CONTINUOUS
Status: CANCELLED | OUTPATIENT
Start: 2025-05-14

## 2025-05-14 RX ORDER — SODIUM CHLORIDE, SODIUM GLUCONATE, SODIUM ACETATE, POTASSIUM CHLORIDE, MAGNESIUM CHLORIDE, SODIUM PHOSPHATE, DIBASIC, AND POTASSIUM PHOSPHATE .53; .5; .37; .037; .03; .012; .00082 G/100ML; G/100ML; G/100ML; G/100ML; G/100ML; G/100ML; G/100ML
100 INJECTION, SOLUTION INTRAVENOUS CONTINUOUS
Status: DISCONTINUED | OUTPATIENT
Start: 2025-05-14 | End: 2025-05-14

## 2025-05-14 RX ORDER — OXYCODONE HYDROCHLORIDE 10 MG/1
10 TABLET ORAL EVERY 6 HOURS PRN
Refills: 0 | Status: DISCONTINUED | OUTPATIENT
Start: 2025-05-14 | End: 2025-05-14

## 2025-05-14 RX ORDER — HYDROMORPHONE HCL/PF 1 MG/ML
0.5 SYRINGE (ML) INJECTION
Status: DISCONTINUED | OUTPATIENT
Start: 2025-05-14 | End: 2025-05-14 | Stop reason: HOSPADM

## 2025-05-14 RX ORDER — LIDOCAINE HYDROCHLORIDE 20 MG/ML
INJECTION, SOLUTION EPIDURAL; INFILTRATION; INTRACAUDAL; PERINEURAL AS NEEDED
Status: DISCONTINUED | OUTPATIENT
Start: 2025-05-14 | End: 2025-05-14

## 2025-05-14 RX ORDER — PROPOFOL 10 MG/ML
INJECTION, EMULSION INTRAVENOUS AS NEEDED
Status: DISCONTINUED | OUTPATIENT
Start: 2025-05-14 | End: 2025-05-14

## 2025-05-14 RX ORDER — OXYCODONE HYDROCHLORIDE 5 MG/1
5 TABLET ORAL EVERY 4 HOURS PRN
Refills: 0 | Status: DISCONTINUED | OUTPATIENT
Start: 2025-05-14 | End: 2025-05-18 | Stop reason: HOSPADM

## 2025-05-14 RX ORDER — ROCURONIUM BROMIDE 10 MG/ML
INJECTION, SOLUTION INTRAVENOUS AS NEEDED
Status: DISCONTINUED | OUTPATIENT
Start: 2025-05-14 | End: 2025-05-14

## 2025-05-14 RX ORDER — CALCIUM CHLORIDE 100 MG/ML
INJECTION INTRAVENOUS; INTRAVENTRICULAR AS NEEDED
Status: DISCONTINUED | OUTPATIENT
Start: 2025-05-14 | End: 2025-05-14

## 2025-05-14 RX ORDER — BUPIVACAINE HYDROCHLORIDE 2.5 MG/ML
INJECTION, SOLUTION EPIDURAL; INFILTRATION; INTRACAUDAL; PERINEURAL AS NEEDED
Status: DISCONTINUED | OUTPATIENT
Start: 2025-05-14 | End: 2025-05-14 | Stop reason: HOSPADM

## 2025-05-14 RX ORDER — ALBUMIN HUMAN 50 G/1000ML
SOLUTION INTRAVENOUS CONTINUOUS PRN
Status: DISCONTINUED | OUTPATIENT
Start: 2025-05-14 | End: 2025-05-14

## 2025-05-14 RX ORDER — HYDROMORPHONE HYDROCHLORIDE 2 MG/ML
INJECTION, SOLUTION INTRAMUSCULAR; INTRAVENOUS; SUBCUTANEOUS AS NEEDED
Status: DISCONTINUED | OUTPATIENT
Start: 2025-05-14 | End: 2025-05-14

## 2025-05-14 RX ORDER — CEFAZOLIN SODIUM 1 G/3ML
INJECTION, POWDER, FOR SOLUTION INTRAMUSCULAR; INTRAVENOUS AS NEEDED
Status: DISCONTINUED | OUTPATIENT
Start: 2025-05-14 | End: 2025-05-14

## 2025-05-14 RX ORDER — MIDAZOLAM HYDROCHLORIDE 2 MG/2ML
INJECTION, SOLUTION INTRAMUSCULAR; INTRAVENOUS AS NEEDED
Status: DISCONTINUED | OUTPATIENT
Start: 2025-05-14 | End: 2025-05-14

## 2025-05-14 RX ORDER — FENTANYL CITRATE 50 UG/ML
INJECTION, SOLUTION INTRAMUSCULAR; INTRAVENOUS AS NEEDED
Status: DISCONTINUED | OUTPATIENT
Start: 2025-05-14 | End: 2025-05-14

## 2025-05-14 RX ADMIN — ROCURONIUM 20 MG: 50 INJECTION, SOLUTION INTRAVENOUS at 16:44

## 2025-05-14 RX ADMIN — SODIUM CHLORIDE, SODIUM GLUCONATE, SODIUM ACETATE, POTASSIUM CHLORIDE, MAGNESIUM CHLORIDE, SODIUM PHOSPHATE, DIBASIC, AND POTASSIUM PHOSPHATE 100 ML/HR: .53; .5; .37; .037; .03; .012; .00082 INJECTION, SOLUTION INTRAVENOUS at 06:11

## 2025-05-14 RX ADMIN — HYDROMORPHONE HYDROCHLORIDE 0.3 MG: 2 INJECTION INTRAMUSCULAR; INTRAVENOUS; SUBCUTANEOUS at 17:09

## 2025-05-14 RX ADMIN — GABAPENTIN 100 MG: 100 CAPSULE ORAL at 08:57

## 2025-05-14 RX ADMIN — HYDROMORPHONE HYDROCHLORIDE 0.2 MG: 2 INJECTION INTRAMUSCULAR; INTRAVENOUS; SUBCUTANEOUS at 18:19

## 2025-05-14 RX ADMIN — ROCURONIUM 50 MG: 50 INJECTION, SOLUTION INTRAVENOUS at 13:49

## 2025-05-14 RX ADMIN — CALCIUM CHLORIDE 200 MG: 100 INJECTION INTRAVENOUS; INTRAVENTRICULAR at 14:58

## 2025-05-14 RX ADMIN — TRANEXAMIC ACID 1 G: 100 INJECTION INTRAVENOUS at 16:20

## 2025-05-14 RX ADMIN — SODIUM CHLORIDE, SODIUM GLUCONATE, SODIUM ACETATE, POTASSIUM CHLORIDE, MAGNESIUM CHLORIDE, SODIUM PHOSPHATE, DIBASIC, AND POTASSIUM PHOSPHATE 100 ML/HR: .53; .5; .37; .037; .03; .012; .00082 INJECTION, SOLUTION INTRAVENOUS at 20:31

## 2025-05-14 RX ADMIN — NOREPINEPHRINE BITARTRATE 16 MCG: 1 INJECTION, SOLUTION, CONCENTRATE INTRAVENOUS at 18:32

## 2025-05-14 RX ADMIN — INSULIN LISPRO 2 UNITS: 100 INJECTION, SOLUTION INTRAVENOUS; SUBCUTANEOUS at 20:39

## 2025-05-14 RX ADMIN — ROCURONIUM 20 MG: 50 INJECTION, SOLUTION INTRAVENOUS at 17:42

## 2025-05-14 RX ADMIN — SUGAMMADEX 100 MG: 100 INJECTION, SOLUTION INTRAVENOUS at 18:42

## 2025-05-14 RX ADMIN — OXYBUTYNIN CHLORIDE 5 MG: 5 TABLET ORAL at 20:31

## 2025-05-14 RX ADMIN — ALBUMIN (HUMAN): 12.5 INJECTION, SOLUTION INTRAVENOUS at 16:51

## 2025-05-14 RX ADMIN — ONDANSETRON 4 MG: 2 INJECTION INTRAMUSCULAR; INTRAVENOUS at 18:15

## 2025-05-14 RX ADMIN — ROCURONIUM 30 MG: 50 INJECTION, SOLUTION INTRAVENOUS at 14:26

## 2025-05-14 RX ADMIN — CALCIUM CHLORIDE 200 MG: 100 INJECTION INTRAVENOUS; INTRAVENTRICULAR at 17:15

## 2025-05-14 RX ADMIN — FENTANYL CITRATE 50 MCG: 50 INJECTION INTRAMUSCULAR; INTRAVENOUS at 16:02

## 2025-05-14 RX ADMIN — CEFAZOLIN 2000 MG: 1 INJECTION, POWDER, FOR SOLUTION INTRAMUSCULAR; INTRAVENOUS; PARENTERAL at 17:46

## 2025-05-14 RX ADMIN — NICOTINE 1 PATCH: 14 PATCH, EXTENDED RELEASE TRANSDERMAL at 08:57

## 2025-05-14 RX ADMIN — CALCIUM CHLORIDE 200 MG: 100 INJECTION INTRAVENOUS; INTRAVENTRICULAR at 17:27

## 2025-05-14 RX ADMIN — ROCURONIUM 20 MG: 50 INJECTION, SOLUTION INTRAVENOUS at 15:06

## 2025-05-14 RX ADMIN — NOREPINEPHRINE BITARTRATE 32 MCG: 1 INJECTION, SOLUTION, CONCENTRATE INTRAVENOUS at 17:25

## 2025-05-14 RX ADMIN — NOREPINEPHRINE BITARTRATE 4 MCG/MIN: 1 INJECTION, SOLUTION, CONCENTRATE INTRAVENOUS at 13:52

## 2025-05-14 RX ADMIN — SUGAMMADEX 100 MG: 100 INJECTION, SOLUTION INTRAVENOUS at 18:45

## 2025-05-14 RX ADMIN — NOREPINEPHRINE BITARTRATE 8 MCG: 1 INJECTION, SOLUTION, CONCENTRATE INTRAVENOUS at 14:30

## 2025-05-14 RX ADMIN — NOREPINEPHRINE BITARTRATE 16 MCG: 1 INJECTION, SOLUTION, CONCENTRATE INTRAVENOUS at 13:48

## 2025-05-14 RX ADMIN — FENTANYL CITRATE 50 MCG: 50 INJECTION INTRAMUSCULAR; INTRAVENOUS at 14:26

## 2025-05-14 RX ADMIN — OXYCODONE 5 MG: 5 TABLET ORAL at 20:39

## 2025-05-14 RX ADMIN — DEXMEDETOMIDINE HYDROCHLORIDE 4 MCG: 100 INJECTION, SOLUTION INTRAVENOUS at 18:30

## 2025-05-14 RX ADMIN — OXYBUTYNIN CHLORIDE 5 MG: 5 TABLET ORAL at 08:57

## 2025-05-14 RX ADMIN — NOREPINEPHRINE BITARTRATE 16 MCG: 1 INJECTION, SOLUTION, CONCENTRATE INTRAVENOUS at 13:50

## 2025-05-14 RX ADMIN — NOREPINEPHRINE BITARTRATE 16 MCG: 1 INJECTION, SOLUTION, CONCENTRATE INTRAVENOUS at 17:14

## 2025-05-14 RX ADMIN — ALBUMIN (HUMAN): 12.5 INJECTION, SOLUTION INTRAVENOUS at 14:25

## 2025-05-14 RX ADMIN — CALCIUM CHLORIDE 200 MG: 100 INJECTION INTRAVENOUS; INTRAVENTRICULAR at 17:44

## 2025-05-14 RX ADMIN — CEFAZOLIN SODIUM 2000 MG: 2 SOLUTION INTRAVENOUS at 13:54

## 2025-05-14 RX ADMIN — CALCIUM CHLORIDE 200 MG: 100 INJECTION INTRAVENOUS; INTRAVENTRICULAR at 14:50

## 2025-05-14 RX ADMIN — DEXAMETHASONE SODIUM PHOSPHATE 10 MG: 10 INJECTION, SOLUTION INTRAMUSCULAR; INTRAVENOUS at 14:20

## 2025-05-14 RX ADMIN — PROPOFOL 150 MG: 10 INJECTION, EMULSION INTRAVENOUS at 13:48

## 2025-05-14 RX ADMIN — ROCURONIUM 30 MG: 50 INJECTION, SOLUTION INTRAVENOUS at 15:43

## 2025-05-14 RX ADMIN — DOCUSATE SODIUM 100 MG: 100 CAPSULE, LIQUID FILLED ORAL at 08:59

## 2025-05-14 RX ADMIN — HYDROMORPHONE HYDROCHLORIDE 0.5 MG: 2 INJECTION INTRAMUSCULAR; INTRAVENOUS; SUBCUTANEOUS at 19:00

## 2025-05-14 RX ADMIN — DEXMEDETOMIDINE HYDROCHLORIDE 4 MCG: 100 INJECTION, SOLUTION INTRAVENOUS at 18:45

## 2025-05-14 RX ADMIN — FINASTERIDE 5 MG: 5 TABLET, FILM COATED ORAL at 08:57

## 2025-05-14 RX ADMIN — SODIUM CHLORIDE: 0.9 INJECTION, SOLUTION INTRAVENOUS at 13:58

## 2025-05-14 RX ADMIN — MIDAZOLAM HYDROCHLORIDE 2 MG: 1 INJECTION, SOLUTION INTRAMUSCULAR; INTRAVENOUS at 13:40

## 2025-05-14 RX ADMIN — LIDOCAINE HYDROCHLORIDE 80 MG: 20 INJECTION, SOLUTION EPIDURAL; INFILTRATION; INTRACAUDAL at 13:48

## 2025-05-14 RX ADMIN — SODIUM CHLORIDE, SODIUM LACTATE, POTASSIUM CHLORIDE, AND CALCIUM CHLORIDE: .6; .31; .03; .02 INJECTION, SOLUTION INTRAVENOUS at 14:46

## 2025-05-14 NOTE — ANESTHESIA POSTPROCEDURE EVALUATION
Post-Op Assessment Note    CV Status:  Stable  Pain Score: 2    Pain management: adequate    Multimodal analgesia used between 6 hours prior to anesthesia start to PACU discharge    Mental Status:  Arousable   Hydration Status:  Stable   PONV Controlled:  None   Airway Patency:  Patent  Two or more mitigation strategies used for obstructive sleep apnea   Post Op Vitals Reviewed: Yes    No anethesia notable event occurred.    Staff: CRNA   Comments: VSS        Last Filed PACU Vitals:  Vitals Value Taken Time   Temp     Pulse     BP     Resp     SpO2

## 2025-05-14 NOTE — ANESTHESIA PREPROCEDURE EVALUATION
Procedure:  NEPHRO-URETERECTOMY LAPAROSCOPIC W/ ROBOTIC (Left: Abdomen)    Relevant Problems   ANESTHESIA (within normal limits)      CARDIO   (+) CAD (coronary artery disease)   (+) Essential hypertension   (+) Presence of stent in coronary artery   (+) Pure hypercholesterolemia      ENDO   (+) Type 2 diabetes mellitus (HCC)      /RENAL   (+) Acute kidney injury (HCC)   (+) Benign prostatic hyperplasia with urinary retention   (+) Hydronephrosis of left kidney      HEMATOLOGY   (+) ABLA (acute blood loss anemia)      Oncology   (+) Urothelial cancer (HCC)      Cardiovascular/Peripheral Vascular   (+) Chronic combined systolic and diastolic heart failure (HCC)        Physical Exam    Airway     Mallampati score: I  TM Distance: >3 FB  Neck ROM: full  Mouth opening: >= 4 cm      Cardiovascular  Rhythm: regular, Rate: normalCardiovascular exam normal    Dental   No notable dental hx     Pulmonary  Pulmonary exam normal     Neurological    He appears awake, alert and oriented x3.      Other Findings        Anesthesia Plan  ASA Score- 3     Anesthesia Type- general with ASA Monitors.         Additional Monitors: arterial line.    Airway Plan: oral.           Plan Factors-    Chart reviewed. EKG reviewed. Imaging results reviewed. Existing labs reviewed. Patient summary reviewed.                  Induction- intravenous.    Postoperative Plan- Plan for postoperative opioid use.   Monitoring Plan - Monitoring plan - arterial line kit  Post Operative Pain Plan - plan for postoperative opioid use    Perioperative Resuscitation Plan - Level 1 - Full Code.       Informed Consent- Anesthetic plan and risks discussed with patient.        NPO Status:  No vitals data found for the desired time range.

## 2025-05-14 NOTE — ASSESSMENT & PLAN NOTE
Lab Results   Component Value Date    HGBA1C 8 (H) 04/28/2025       Recent Labs     05/13/25  1647 05/13/25  2146 05/14/25  0735 05/14/25  1111   POCGLU 203* 287* 138 150*       Blood Sugar Average: Last 72 hrs:  (P) [id number]

## 2025-05-14 NOTE — ASSESSMENT & PLAN NOTE
Developed GUIDO on the day of transfer.  Morning creatinine 1.18. Creatinine on arrival 1.85.    Lab Results   Component Value Date    CREATININE 1.85 (H) 05/14/2025    CREATININE 1.18 05/13/2025     Hold ACE/ARB and diuretic therapy.  Avoid hypotension, nephrotoxins, and NSAIDS if possible.  IV fluids following transfusion.

## 2025-05-14 NOTE — UTILIZATION REVIEW
NOTIFICATION OF ADMISSION DISCHARGE   This is a Notification of Discharge from [facility]. Please be advised that this patient has been discharge from our facility. Below you will find the admission and discharge date and time including the patient’s disposition.   UTILIZATION REVIEW CONTACT:  Utilization Review Assistants  Network Utilization Review Department  Phone: [phone] x carefully listen to the prompts. All voicemails are confidential.  Email: [email or web address]     ADMISSION INFORMATION  PRESENTATION DATE: 5/10/2025  5:24 PM  OBERVATION ADMISSION DATE: N/A  INPATIENT ADMISSION DATE: 5/10/25  5:24 PM   DISCHARGE DATE: 5/13/2025  8:04 PM   DISPOSITION:[facility] Utilization Review Department  ATTENTION: Please call with any questions or concerns to [phone] and carefully listen to the prompts so that you are directed to the right person. All voicemails are confidential.   For Discharge needs, contact Care Management DC Support Team at [phone] opt. 2  Send all requests for admission clinical reviews, approved or denied determinations and any other requests to dedicated fax number below belonging to the campus where the patient is receiving treatment. List of dedicated fax numbers for the Facilities:  FACILITY NAME UR FAX NUMBER   ADMISSION DENIALS (Administrative/Medical Necessity) [phone]   DISCHARGE SUPPORT TEAM ([facility]) [phone]   PARENT CHILD HEALTH (Maternity/NICU/Pediatrics) [phone]   [facility] [phone]   [facility] [phone]   [facility] [phone]   [facility] [phone]   [facility] [phone]   [facility] [phone]   [facility] [phone]   [facility] [phone]   [facility]  [facility] [phone]   [facility] [phone]   [facility] [phone]   [facility] [phone]

## 2025-05-14 NOTE — ASSESSMENT & PLAN NOTE
CAD with history of stenting follows with [facility] cardiology  Aspirin on hold due to hematuria

## 2025-05-14 NOTE — ASSESSMENT & PLAN NOTE
Resolved    Results from last 7 days   Lab Units 05/15/25  0618 05/14/25  1936 05/14/25  1002 05/14/25  0038   SODIUM mmol/L 136 135 137 131*

## 2025-05-14 NOTE — ASSESSMENT & PLAN NOTE
Secondary to gross hematuria and postoperative anemia  Has been transfused a total of 3 units PRBC since admission    Results from last 7 days   Lab Units 05/15/25  1240 05/15/25  0742 05/14/25  2359 05/14/25  1936   HEMOGLOBIN g/dL 10.5* 10.4* 6.5* 8.9*

## 2025-05-14 NOTE — ASSESSMENT & PLAN NOTE
Lab Results   Component Value Date    HGBA1C 8 (H) 04/28/2025     Recent Labs     05/14/25  1917 05/14/25  2027 05/15/25  0724 05/15/25  1133   POCGLU 201* 216* 167* 265*     Prior to admission on glyburide metformin empagliflozin and semaglutide  Currently on insulin sliding scale.  Restart glyburide tomorrow if no significant renal dysfunction.    Will order low-dose glargine 5 units this evening

## 2025-05-14 NOTE — ASSESSMENT & PLAN NOTE
Acute kidney injury improved with IV fluids  Monitor volume status closely given combined CHF    Results from last 7 days   Lab Units 05/15/25  0618 05/14/25  1936 05/14/25  1002 05/14/25  0038 05/13/25  0809 05/12/25  0523 05/11/25  0512 05/10/25  0546 05/09/25  1113   BUN mg/dL 29* 28* 25 28* 22 24 21 19 17   CREATININE mg/dL 1.10 1.19 1.22 1.85* 1.18 1.11 0.84 0.97 0.94   EGFR ml/min/1.73sq m 73 66 64 38 67 72 95 85 88

## 2025-05-14 NOTE — H&P
"H&P - Hospitalist   Name: Jw Ray 59 y.o. male I MRN: [id number]  Unit/Bed#: E5 -01 I Date of Admission: 5/13/2025   Date of Service: 5/13/2025 I Hospital Day: 0     Assessment & Plan  Urothelial cancer (HCC)  Presents with ongoing history of transient hematuria.  03/2024 - CT Urogram and cystoscopy without identified source of bleeding.  05/09/2025 - Presented to [facility] for gross hematuria. Three-way brewer placed with CBI.   Transferred to [facility] for urology evaluation in setting of ABLA.  CT renal stone A/P:  \"Large dependent lobulated hyperdensity in the urinary bladder, likely blood clot, although neoplasm is not excluded. Stable appearance of left hydronephrosis containing high density material, likely representing blood products, although neoplasm again not excluded.\"  05/11/2025   CT C/A/P:  \"No evidence of thoracic or abdominal pelvic metastatic disease.\"  Left ureteroscopy showing large volume of tumor over taking what appeared to be the left upper pole of the collecting system.  Multiple biopsies performed with biopsy forceps and basket.  The ureter appeared uninvolved with cancer. Concern for high-grade urothelial cancer.  Left stent placed  Patient adamantly refuses chemotherapy despite oncology's recommendations out of concern for cardiac toxicity. Desires surgery and chemotherapy avoidance if possible.  05/13/2025 - Transfer to [facility] for urgent OR robotic procedure. Cardiology cleared for surgery.  Continue finasteride, oxybutynin, pyridium.  ABLA (acute blood loss anemia)  In setting of gross hematuria. Hgb has steadily declined since initial admission.  Hgb trend 13.3 ->8.7->9.6.  Serial hgb q8h. Transfuse < 7.  Type and screen ordered.  Patient found to be hypotensive on arrival. Ordered H&H.  Hgb 7.6 in setting of lightheadedness and hypotension. Transfused 1 unit. Trend.  Acute kidney injury (HCC)  Developed GUIDO on the day of transfer.  Morning creatinine 1.18. Creatinine on " arrival 1.85.    Lab Results   Component Value Date    CREATININE 1.85 (H) 05/14/2025    CREATININE 1.18 05/13/2025     Hold ACE/ARB and diuretic therapy.  Avoid hypotension, nephrotoxins, and NSAIDS if possible.  IV fluids following transfusion.   Hydronephrosis of left kidney  CT on 5/09/25:  Stable appearance of left hydronephrosis containing high density material, likely representing blood products, although neoplasm again not excluded.   S/p left ureteroscopy w/ ureteral stent placement and biopsy. Concern for high-grade large volume upper tract urothelial cancer of left kidney.  Plan per above.  Chronic combined systolic and diastolic heart failure (HCC)  Wt Readings from Last 3 Encounters:   05/10/25 86.1 kg (189 lb 13.1 oz)   05/09/25 86.1 kg (189 lb 13.1 oz)   07/13/24 84.1 kg (185 lb 6.5 oz)     Last echo LVEF 45% ~4 years ago.  Home regiment of carvedilol, entresto, spironolactone, and Jardiance.  Carvedilol has been held in setting of soft BP. Will continue this preoperatively per cardiology's recommendations.  CAD (coronary artery disease)  S/p PCI in 2010 with BALAJI to OM 3.  Continue statin therapy. Aspirin held in setting of ABLA.  Type 2 diabetes mellitus (HCC)  Lab Results   Component Value Date    HGBA1C 8 (H) 04/28/2025       Recent Labs     05/12/25  2105 05/13/25  0623 05/13/25  1023 05/13/25  1647   POCGLU 174* 200* 237* 203*       Blood Sugar Average: Last 72 hrs:    Home agents held. Sliding scale and hypoglycemia protocol.   Hyponatremia  Sodium 130. Believed to be related to SIADH/recent urologic procedure.   Monitor.      VTE Pharmacologic Prophylaxis:   High Risk (Score >/= 5) - Pharmacological DVT Prophylaxis Contraindicated. Sequential Compression Devices Ordered.  Code Status: Level 1 - Full Code   Discussion with family: Patient declined call to .     Anticipated Length of Stay: Patient will be admitted on an inpatient basis with an anticipated length of stay of greater  than 2 midnights secondary to surgery.    History of Present Illness   Chief Complaint: hematuria    Jw Ray is a 59 y.o. male with a PMH of congestive heart failure, hypertension, type 2 diabetes mellitus coronary artery disease status post PCI who [facility].  Patient initially presented to [facility] on 5/9/2025 for gross hematuria.  Started with a three-way Green and CBI.  CT showed large hyperdensity concerning for possible neoplasm.  Significant drop in hemoglobin required transfer to [facility] for urologic evaluation.  Underwent clot evacuation and left ureteroscopy with left ureteral stent placement and biopsy at [facility] on 5/11.  Patient has opted for surgery at [facility] for urgent robotic procedure.  Cardiology cleared for surgery.  Patient states his bladder remains uncomfortable with continued spasms.  He reports he is otherwise asymptomatic.  He denies chest pain, shortness of breath, fever, chills, N/V/D, lightheadedness, dizziness, palpitations, presyncopal symptoms.    Review of Systems   Constitutional:  Negative for chills and fever.   HENT:  Negative for ear pain and sore throat.    Eyes:  Negative for pain and visual disturbance.   Respiratory:  Negative for cough and shortness of breath.    Cardiovascular:  Negative for chest pain and palpitations.   Gastrointestinal:  Negative for abdominal pain and vomiting.   Genitourinary:  Positive for hematuria. Negative for dysuria.   Musculoskeletal:  Negative for arthralgias and back pain.   Skin:  Negative for color change and rash.   Neurological:  Negative for seizures and syncope.   All other systems reviewed and are negative.      Historical Information   Past Medical History:   Diagnosis Date    CHF (congestive heart failure) (HCC)     DM (diabetes mellitus) (HCC)     HTN (hypertension)      Past Surgical History:   Procedure Laterality Date     CARDIAC SURGERY      FL RETROGRADE PYELOGRAM  5/11/2025    NE CYSTO W/IRRIG & EVAC MULTPLE OBSTRUCTING CLOTS Bilateral 5/11/2025    Procedure: CYSTOSCOPY EVACUATION OF CLOTS, Left Ureterscopy and biopsy, Left Retrograde and Left Stent. Right Retrograde Pyelogram;  Surgeon: Marbin Santacruz MD;  Location: [facility];  Service: Urology     Social History     Tobacco Use    Smoking status: Never    Smokeless tobacco: Current     Types: Chew   Substance and Sexual Activity    Alcohol use: Not Currently    Drug use: Never    Sexual activity: Not on file     E-Cigarette/Vaping     E-Cigarette/Vaping Substances     No family history on file.  Social History:  Marital Status: /Civil Union   Occupation:   Patient Pre-hospital Living Situation: Home  Patient Pre-hospital Level of Mobility: walks  Patient Pre-hospital Diet Restrictions: diabetic    Meds/Allergies   I have reviewed home medications using recent Epic encounter.  Prior to Admission medications    Medication Sig Start Date End Date Taking? Authorizing Provider   atorvastatin (LIPITOR) 40 mg tablet Take 20 mg by mouth daily 4/23/24   Historical Provider, MD   carvedilol (COREG) 25 mg tablet Take 25 mg by mouth 2 (two) times a day with meals 4/23/24   Historical Provider, MD   Cholecalciferol (Vitamin D3) 50 MCG (2000 UT) CHEW Chew    Historical Provider, MD   Coenzyme Q10 (CoQ10) 100 MG CAPS Take by mouth    Historical Provider, MD   Empagliflozin 25 MG TABS Take 25 mg by mouth daily 4/23/24   Historical Provider, MD   finasteride (PROSCAR) 5 mg tablet Take 1 tablet (5 mg total) by mouth daily  Patient not taking: Reported on 5/9/2025 7/17/24   Frida Burton PA-C   gabapentin (NEURONTIN) 100 mg capsule Take 100 mg by mouth daily 4/23/24   Historical Provider, MD   glyBURIDE (DIABETA) 2.5 mg tablet Take 2.5 mg by mouth daily with breakfast    Historical Provider, MD   metFORMIN (GLUCOPHAGE) 1000 MG tablet Take 1 tablet by mouth 2 (two) times a day with meals  4/23/24   Historical Provider, MD   nicotine (NICODERM CQ) 14 mg/24hr TD 24 hr patch Place 1 patch on the skin over 24 hours daily  Patient not taking: Reported on 5/9/2025 7/17/24   Frida Burotn PA-C   Omega 3 1000 MG CAPS Take by mouth    Historical Provider, MD   phenazopyridine (PYRIDIUM) 100 mg tablet Take 1 tablet (100 mg total) by mouth 3 (three) times a day as needed for bladder spasms  Patient not taking: Reported on 5/9/2025 7/16/24   Frida Burton PA-C   sacubitril-valsartan (Entresto) 49-51 MG TABS Take 1 tablet by mouth 2 (two) times a day 4/23/24   Historical Provider, MD   semaglutide, 1 mg/dose, (Ozempic, 1 MG/DOSE,) 2 mg/1.5 mL injection pen Inject 1 mg under the skin Once a week 4/23/24   Historical Provider, MD   sildenafil (VIAGRA) 25 MG tablet Take 25 mg by mouth daily as needed 4/23/24   Historical Provider, MD   spironolactone (ALDACTONE) 25 mg tablet Take 25 mg by mouth daily 4/23/24   Historical Provider, MD   vitamin B-12 (VITAMIN B-12) 500 mcg tablet Take 500 mcg by mouth daily    Historical Provider, MD     Allergies   Allergen Reactions    Codeine Nausea Only       Objective :  Temp:  [97.7 °F (36.5 °C)-98.4 °F (36.9 °C)] 97.8 °F (36.6 °C)  HR:  [105-123] 122  BP: ()/(57-77) 82/64  Resp:  [18] 18  SpO2:  [92 %-95 %] 94 %  O2 Device: None (Room air)    Physical Exam  Vitals and nursing note reviewed.   Constitutional:       General: He is not in acute distress.     Appearance: He is well-developed.   HENT:      Head: Normocephalic and atraumatic.   Eyes:      Conjunctiva/sclera: Conjunctivae normal.   Cardiovascular:      Rate and Rhythm: Normal rate and regular rhythm.      Heart sounds: No murmur heard.  Pulmonary:      Effort: Pulmonary effort is normal. No respiratory distress.      Breath sounds: Normal breath sounds.   Abdominal:      Palpations: Abdomen is soft.      Tenderness: There is no abdominal tenderness.   Musculoskeletal:         General: No swelling.      Cervical  back: Neck supple.   Skin:     General: Skin is warm and dry.   Neurological:      General: No focal deficit present.      Mental Status: He is alert and oriented to person, place, and time.   Psychiatric:         Mood and Affect: Mood normal.          Lines/Drains:    Urinary Catheter:  Goal for removal: N/A - Chronic Green               Lab Results: I have reviewed the following results:  Results from last 7 days   Lab Units 05/13/25  1654 05/11/25  0756 05/11/25  0512 05/10/25  1605 05/10/25  0546   WBC Thousand/uL  --   --  11.08*  --  11.51*   HEMOGLOBIN g/dL 9.6*   < > 10.1*   < > 10.8*   HEMATOCRIT % 28.7*   < > 30.1*   < > 32.9*   PLATELETS Thousands/uL  --   --  160  --  166   SEGS PCT %  --   --   --   --  68   LYMPHO PCT %  --   --   --   --  22   MONO PCT %  --   --   --   --  8   EOS PCT %  --   --   --   --  1    < > = values in this interval not displayed.     Results from last 7 days   Lab Units 05/13/25  0809 05/11/25  0512 05/10/25  0546   SODIUM mmol/L 130*   < > 137   POTASSIUM mmol/L 3.9   < > 4.1   CHLORIDE mmol/L 100   < > 105   CO2 mmol/L 22   < > 24   BUN mg/dL 22   < > 19   CREATININE mg/dL 1.18   < > 0.97   ANION GAP mmol/L 8   < > 8   CALCIUM mg/dL 8.0*   < > 8.2*   ALBUMIN g/dL  --   --  3.7   TOTAL BILIRUBIN mg/dL  --   --  0.52   ALK PHOS U/L  --   --  34   ALT U/L  --   --  8   AST U/L  --   --  9*   GLUCOSE RANDOM mg/dL 276*   < > 140    < > = values in this interval not displayed.     Results from last 7 days   Lab Units 05/09/25  1113   INR  0.99     Results from last 7 days   Lab Units 05/13/25  1647 05/13/25  1023 05/13/25  0623 05/12/25  2105 05/12/25  1647 05/12/25  1102 05/12/25  0621 05/11/25  2044 05/11/25  1631 05/11/25  1240 05/11/25  1109 05/11/25  0546   POC GLUCOSE mg/dl 203* 237* 200* 174* 203* 177* 165* 263* 255* 182* 169* 136     Lab Results   Component Value Date    HGBA1C 8 (H) 04/28/2025    HGBA1C 9.1 (H) 01/10/2024    HGBA1C 9.1 (H) 07/01/2023                  Administrative Statements       ** Please Note: This note has been constructed using a voice recognition system. **

## 2025-05-14 NOTE — CASE MANAGEMENT
Case Management Discharge Planning Note    Patient name Jw Ray  Location [facility] /E5 -* MRN [id number]  : 1965 Date 2025       Current Admission Date: 2025  Current Admission Diagnosis:Urothelial cancer (HCC)   Patient Active Problem List    Diagnosis Date Noted    Acute kidney injury (HCC) 2025    Hyponatremia 2025    ABLA (acute blood loss anemia) 05/10/2025    Hydronephrosis of left kidney 2025    Benign prostatic hyperplasia with urinary retention 2024    Urothelial cancer (HCC) 2023    Pure hypercholesterolemia 2023    Chronic combined systolic and diastolic heart failure (HCC) 2022    Vitamin D deficiency 2020    Presence of stent in coronary artery 2020    Ischemic cardiomyopathy 10/29/2018    CAD (coronary artery disease) 2013    Essential hypertension 2013    Type 2 diabetes mellitus (HCC) 2011    Long term current use of anticoagulant therapy 12/15/2010      LOS (days): 1  Geometric Mean LOS (GMLOS) (days):   Days to GMLOS:     OBJECTIVE:  Risk of Unplanned Readmission Score: 21.6         Current admission status: Inpatient   Preferred Pharmacy:   [facility] [address] [place] [address] - [address]  [address] [facility] [address]  Phone: [phone] Fax: [phone]    [facility] - [place] [facility]  [place] [address]  Phone: [phone] Fax: [phone]    Primary Care Provider: Thanh Echeverria MD    Primary Insurance: BLUE CROSS  Secondary Insurance:     DISCHARGE DETAILS:               Additional Comments: Patient is a transfer from \[facility]\"",  assessment conducted on  and . Pending recommendations and discharge planning.

## 2025-05-14 NOTE — PLAN OF CARE
Problem: PAIN - ADULT  Goal: Verbalizes/displays adequate comfort level or baseline comfort level  Description: Interventions:  - Encourage patient to monitor pain and request assistance  - Assess pain using appropriate pain scale  - Administer analgesics based on type and severity of pain and evaluate response  - Implement non-pharmacological measures as appropriate and evaluate response  - Consider cultural and social influences on pain and pain management  - Notify physician/advanced practitioner if interventions unsuccessful or patient reports new pain  Outcome: Progressing     Problem: INFECTION - ADULT  Goal: Absence or prevention of progression during hospitalization  Description: INTERVENTIONS:  - Assess and monitor for signs and symptoms of infection  - Monitor lab/diagnostic results  - Monitor all insertion sites, i.e. indwelling lines, tubes, and drains  - Monitor endotracheal if appropriate and nasal secretions for changes in amount and color  - [place] appropriate cooling/warming therapies per order  - Administer medications as ordered  - Instruct and encourage patient and family to use good hand hygiene technique  - Identify and instruct in appropriate isolation precautions for identified infection/condition  Outcome: Progressing     Problem: SAFETY ADULT  Goal: Patient will remain free of falls  Description: INTERVENTIONS:  - Educate patient/family on patient safety including physical limitations  - Instruct patient to call for assistance with activity   - Consult OT/PT to assist with strengthening/mobility   - Keep Call bell within reach  - Keep bed low and locked with side rails adjusted as appropriate  - Keep care items and personal belongings within reach  - Initiate and maintain comfort rounds  - Make Fall Risk Sign visible to staff  - Offer Toileting every 3 Hours, in advance of need  - Initiate/Maintain bed alarm  - Obtain necessary fall risk management equipment  - Apply yellow socks and  bracelet for high fall risk patients  - Consider moving patient to room near nurses station  Outcome: Progressing  Goal: Maintain or return to baseline ADL function  Description: INTERVENTIONS:  -  Assess patient's ability to carry out ADLs; assess patient's baseline for ADL function and identify physical deficits which impact ability to perform ADLs (bathing, care of mouth/teeth, toileting, grooming, dressing, etc.)  - Assess/evaluate cause of self-care deficits   - Assess range of motion  - Assess patient's mobility; develop plan if impaired  - Assess patient's need for assistive devices and provide as appropriate  - Encourage maximum independence but intervene and supervise when necessary  - Involve family in performance of ADLs  - Assess for home care needs following discharge   - Consider OT consult to assist with ADL evaluation and planning for discharge  - Provide patient education as appropriate  Outcome: Progressing  Goal: Maintains/Returns to pre admission functional level  Description: INTERVENTIONS:  - Perform AM-PAC 6 Click Basic Mobility/ Daily Activity assessment daily.  - Set and communicate daily mobility goal to care team and patient/family/caregiver.   - Collaborate with rehabilitation services on mobility goals if consulted  - Perform Range of Motion 3 times a day.  - Reposition patient every 3 hours.  - Dangle patient 3 times a day  - Stand patient 3 times a day  - Ambulate patient 3 times a day  - Out of bed to chair 3 times a day   - Out of bed for meals 3 times a day  - Out of bed for toileting  - Record patient progress and toleration of activity level   Outcome: Progressing     Problem: DISCHARGE PLANNING  Goal: Discharge to home or other facility with appropriate resources  Description: INTERVENTIONS:  - Identify barriers to discharge w/patient and caregiver  - Arrange for needed discharge resources and transportation as appropriate  - Identify discharge learning needs (meds, wound care,  etc.)  - Arrange for interpretive services to assist at discharge as needed  - Refer to Case Management Department for coordinating discharge planning if the patient needs post-hospital services based on physician/advanced practitioner order or complex needs related to functional status, cognitive ability, or social support system  Outcome: Progressing     Problem: Knowledge Deficit  Goal: Patient/family/caregiver demonstrates understanding of disease process, treatment plan, medications, and discharge instructions  Description: Complete learning assessment and assess knowledge base.  Interventions:  - Provide teaching at level of understanding  - Provide teaching via preferred learning methods  Outcome: Progressing

## 2025-05-14 NOTE — UTILIZATION REVIEW
Initial Clinical Review    TRANSFER FROM [facility]    Admission: Date/Time/Statement:   Admission Orders (From admission, onward)       Ordered        05/13/25 2039  INPATIENT ADMISSION  Once                          Orders Placed This Encounter   Procedures    INPATIENT ADMISSION     Standing Status:   Standing     Number of Occurrences:   1     Level of Care:   Med Surg [16]     Estimated length of stay:   More than 2 Midnights     Certification:   I certify that inpatient services are medically necessary for this patient for a duration of greater than two midnights. See H&P and MD Progress Notes for additional information about the patient's course of treatment.       Initial Presentation: 59 y.o. male  initially presented  to [facility]   on  5/9  with gross hematuria.    Hemoglobin  dropped significantly   and transferred to [facility].  Underwent clot evacuation and left ureteroscopy with left ureteral stent placement and biopsy at [place] on 5/11.  Had ongoing hematuria and worsening anemia and transferred to [facility]  for urgent  OR procedure. Additional PMH is   DM2,  essential hypertension,  CAD  and CHF.   Admit  Ip with Urothelial cancer, Hydronephrosis of left kidney  and  ABLA  and plan is  monitor labs,  3 way brewer  with CBI, failed clamp trial 5/12 and  5/13, urology/oncology consults,   and continue current meds.    Anticipated Length of Stay/Certification Statement: Patient will be admitted on an inpatient basis with an anticipated length of stay of greater than 2 midnights secondary to surgery.     Date:   5/14   Day 2:     OR  this pm  for  left nephro ureterectomy.  Hemoglobin this am   7.6.   No transfusion thus far.      Scheduled Medications:  atorvastatin, 40 mg, Oral, Daily With Dinner  carvedilol, 25 mg, Oral, BID With Meals  docusate sodium, 100 mg, Oral, BID  finasteride, 5 mg, Oral, Daily  gabapentin, 100 mg, Oral, Daily  insulin lispro, 1-6 Units, Subcutaneous, TID AC  insulin lispro, 1-6  Units, Subcutaneous, HS  nicotine, 1 patch, Transdermal, Daily  oxybutynin, 5 mg, Oral, TID  polyethylene glycol, 17 g, Oral, Daily  sacubitril-valsartan, 1 tablet, Oral, BID      Continuous IV Infusions:  multi-electrolyte, 100 mL/hr, Intravenous, Continuous      PRN Meds:  acetaminophen, 650 mg, Oral, Q6H PRN  bisacodyl, 10 mg, Rectal, Daily PRN  ondansetron, 4 mg, Intravenous, Q6H PRN  oxyCODONE, 5 mg, Oral, Q4H PRN  phenazopyridine, 200 mg, Oral, TID PRN      ED Triage Vitals   Temperature Pulse Respirations Blood Pressure SpO2 Pain Score   05/13/25 2026 05/13/25 2029 05/13/25 2026 05/13/25 2026 05/13/25 2029 05/13/25 2041   97.8 °F (36.6 °C) (!) 122 18 (!) 82/57 94 % No Pain     Weight (last 2 days)       None            Vital Signs (last 3 days)       Date/Time Temp Pulse Resp BP MAP (mmHg) SpO2 O2 Device Patient Position - Orthostatic VS Pain    05/14/25 07:38:38 97.4 °F (36.3 °C) 80 20 103/63 76 97 % None (Room air) Lying --    05/14/25 07:37:55 97.4 °F (36.3 °C) -- -- 103/63 76 -- -- -- --    05/14/25 05:47:13 97.4 °F (36.3 °C) 86 18 101/64 76 96 % -- -- --    05/14/25 04:15:28 -- 78 -- 92/52 65 93 % -- -- --    05/14/25 03:11:44 -- 78 -- 93/52 66 95 % -- -- --    05/14/25 02:35:32 98 °F (36.7 °C) 86 18 86/48 61 93 % -- -- --    05/14/25 02:28:10 97.8 °F (36.6 °C) 82 18 91/54 66 94 % -- -- --    05/14/25 02:15:50 98.1 °F (36.7 °C) 82 18 85/56 66 97 % -- -- --    05/14/25 0214 98.1 °F (36.7 °C) 87 18 85/56 -- -- -- -- --    05/14/25 01:49:04 97.4 °F (36.3 °C) 86 18 88/60 69 95 % Nasal cannula -- --    05/14/25 01:14:19 -- 85 -- 79/51 60 94 % -- -- --    05/14/25 00:38:11 -- 81 -- 76/46 56 95 % -- -- --    05/14/25 00:07:31 -- 85 -- 80/51 61 95 % -- -- --    05/14/25 0006 -- 87 -- 80/51 61 95 % -- -- --    05/13/25 2328 -- 92 18 68/42 -- 93 % Nasal cannula -- --    05/13/25 23:21:01 98.6 °F (37 °C) 96 -- -- -- 92 % -- -- --    05/13/25 21:58:30 -- 95 -- 93/60 71 94 % -- -- --    05/13/25 2043 -- -- -- 82/64  -- -- -- -- --    05/13/25 2041 -- -- -- -- -- -- -- -- No Pain    05/13/25 20:29:32 97.8 °F (36.6 °C) 122 18 82/57 65 94 % -- -- --    05/13/25 20:26:49 97.8 °F (36.6 °C) -- 18 82/57 65 -- -- -- --              Pertinent Labs/Diagnostic Test Results:   Radiology:  Cardiology:  GI:        Results from last 7 days   Lab Units 05/14/25  1002 05/14/25  0023 05/13/25  1654 05/13/25  0809 05/13/25  0009 05/11/25  0756 05/11/25  0512 05/10/25  1605 05/10/25  0546 05/09/25  1616 05/09/25  1113   WBC Thousand/uL  --   --   --   --   --   --  11.08*  --  11.51*  --  10.20*   HEMOGLOBIN g/dL 9.0* 7.6* 9.6* 8.7* 8.9*   < > 10.1*   < > 10.8*   < > 13.3   HEMATOCRIT % 26.3* 23.0* 28.7* 26.2* 26.2*   < > 30.1*   < > 32.9*   < > 40.5   PLATELETS Thousands/uL  --   --   --   --   --   --  160  --  166  --  201   TOTAL NEUT ABS Thousands/µL  --   --   --   --   --   --   --   --  7.74*  --  7.03    < > = values in this interval not displayed.         Results from last 7 days   Lab Units 05/14/25  1002 05/14/25  0038 05/13/25  0809 05/12/25  0523 05/11/25  0512   SODIUM mmol/L 137 131* 130* 135 138   POTASSIUM mmol/L 4.0 4.3 3.9 4.2 3.8   CHLORIDE mmol/L 104 98 100 103 107   CO2 mmol/L 26 23 22 25 24   ANION GAP mmol/L 7 10 8 7 7   BUN mg/dL 25 28* 22 24 21   CREATININE mg/dL 1.22 1.85* 1.18 1.11 0.84   EGFR ml/min/1.73sq m 64 38 67 72 95   CALCIUM mg/dL 7.8* 8.0* 8.0* 8.2* 8.2*     Results from last 7 days   Lab Units 05/14/25  0038 05/10/25  0546 05/09/25  1113   AST U/L 8* 9* 12*   ALT U/L 5* 8 11   ALK PHOS U/L 30* 34 41   TOTAL PROTEIN g/dL 5.6* 5.8* 7.0   ALBUMIN g/dL 3.4* 3.7 4.6   TOTAL BILIRUBIN mg/dL 1.07* 0.52 0.60     Results from last 7 days   Lab Units 05/14/25  0735 05/13/25  2146 05/13/25  1647 05/13/25  1023 05/13/25  0623 05/12/25  2105 05/12/25  1647 05/12/25  1102 05/12/25  0621 05/11/25  2044 05/11/25  1631 05/11/25  1240   POC GLUCOSE mg/dl 138 287* 203* 237* 200* 174* 203* 177* 165* 263* 255* 182*     Results  from last 7 days   Lab Units 05/14/25  1002 05/14/25  0038 05/13/25  0809 05/12/25  0523 05/11/25  0512 05/10/25  0546 05/09/25  1113   GLUCOSE RANDOM mg/dL 147* 285* 276* 172* 155* 140 190*               Results from last 7 days   Lab Units 05/09/25  1113   PROTIME seconds 13.5   INR  0.99   PTT seconds 26               Results from last 7 days   Lab Units 05/14/25  0530   UNIT PRODUCT CODE  [id number]   UNIT NUMBER  [id number]   UNITABO  B   UNITRH  POS   CROSSMATCH  Compatible   UNIT DISPENSE STATUS  Presumed Trans   UNIT PRODUCT VOL ml 350             Present on Admission:   Urothelial cancer (HCC)   Hydronephrosis of left kidney   Chronic combined systolic and diastolic heart failure (HCC)   Type 2 diabetes mellitus (HCC)   CAD (coronary artery disease)   Hyponatremia   ABLA (acute blood loss anemia)   Acute kidney injury (HCC)      Admitting Diagnosis: Urothelial cancer (HCC)  Age/Sex: 59 y.o. male    Network Utilization Review Department  ATTENTION: Please call with any questions or concerns to [phone] and carefully listen to the prompts so that you are directed to the right person. All voicemails are confidential.   For Discharge needs, contact Care Management DC Support Team at [phone] opt. 2  Send all requests for admission clinical reviews, approved or denied determinations and any other requests to dedicated fax number below belonging to the campus where the patient is receiving treatment. List of dedicated fax numbers for the Facilities:  FACILITY NAME UR FAX NUMBER   ADMISSION DENIALS (Administrative/Medical Necessity) [phone]   DISCHARGE SUPPORT TEAM (NETWORK) [phone]   PARENT CHILD HEALTH (Maternity/NICU/Pediatrics) [phone]   [facility] [phone]   [facility] [phone]   [facility] [phone]   [facility] [phone]   [facility]  [phone]   [facility] [phone]   [facility] [phone]   [facility] [phone]   [facility] [phone]   [facility] [phone]   [facility] [phone]   [facility] [phone]

## 2025-05-14 NOTE — PROGRESS NOTES
"Progress Note - Urology   Name: Jw Ray 59 y.o. male I MRN: [id number]  Unit/Bed#: E5 -01 I Date of Admission: 5/13/2025   Date of Service: 5/14/2025 I Hospital Day: 1    Assessment & Plan  Urothelial cancer (HCC)  Has had acute blood loss anemia from days of ongoing gross hematuria.  Hemoglobin today is 7.6.  Type and screen completed for operation later today.  No transfusion to date. There is a large volume of tumor over taking the left upper pole of the collecting system.  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  He is scheduled for left nephro ureterectomy today with Dr Santacruz.  Staging CT chest abdomen pelvis with no evidence of metastatic disease. He is NPO today.    /63 (BP Location: Right arm)   Pulse 80   Temp (!) 97.4 °F (36.3 °C) (Oral)   Resp 20   Ht 5' 7\" (1.702 m)   SpO2 97%   BMI 29.73 kg/m²     Lab Results   Component Value Date    WBC 11.08 (H) 05/11/2025     Lab Results   Component Value Date    HGB 9.0 (L) 05/14/2025     Lab Results   Component Value Date    CREATININE 1.22 05/14/2025       Chronic combined systolic and diastolic heart failure (HCC)  Wt Readings from Last 3 Encounters:   05/10/25 86.1 kg (189 lb 13.1 oz)   05/09/25 86.1 kg (189 lb 13.1 oz)   07/13/24 84.1 kg (185 lb 6.5 oz)   No dyspnea, on room air.    Type 2 diabetes mellitus (HCC)  Lab Results   Component Value Date    HGBA1C 8 (H) 04/28/2025       Recent Labs     05/13/25  1647 05/13/25  2146 05/14/25  0735 05/14/25  1111   POCGLU 203* 287* 138 150*       Blood Sugar Average: Last 72 hrs:  (P) [id number]          Subjective   He feels well he feels ready for surgery today.  Feels thirsty and dry because of being NPO.  Urine is lighter pink/orange today.  No abdominal pain. "  He took the magnesium citrate pass a lot of gas this morning but no bowel movement from the prep.  Reports only prior abdominal surgery was a flank approach stone years ago.    Objective :  Temp:  [97.4 °F (36.3 °C)-98.6 °F (37 °C)] 97.4 °F (36.3 °C)  HR:  [] 80  BP: ()/(42-77) 103/63  Resp:  [18-20] 20  SpO2:  [92 %-97 %] 97 %  O2 Device: None (Room air)    I/O         05/12 0701  05/13 0700 05/13 0701  05/14 0700 05/14 0701  05/15 0700    Blood  527.5     Total Intake  527.5     Urine  2600 1200    Total Output  2600 1200    Net  -2072.5 -1200                 Lines/Drains/Airways       Active Status       Name Placement date Placement time Site Days    Urethral Catheter Three way 24 Fr. 05/11/25  1054  Three way  3                  Physical Exam  Constitutional:       General: He is not in acute distress.     Appearance: Normal appearance. He is not ill-appearing or diaphoretic.   HENT:      Head: Normocephalic and atraumatic.     Cardiovascular:      Rate and Rhythm: Normal rate and regular rhythm.      Pulses: Normal pulses.      Heart sounds: Normal heart sounds.   Pulmonary:      Effort: Pulmonary effort is normal.      Breath sounds: Normal breath sounds.   Abdominal:      General: Abdomen is flat.      Palpations: Abdomen is soft.      Tenderness: There is no right CVA tenderness or left CVA tenderness.   Genitourinary:     Comments: Green catheter per urethra draining clear peach colored urine on slow CBI.    Musculoskeletal:      Right lower leg: No edema.      Left lower leg: No edema.     Skin:     Coloration: Skin is not pale.      Findings: No rash.     Neurological:      General: No focal deficit present.      Mental Status: He is alert. Mental status is at baseline.      Gait: Gait normal.     Psychiatric:         Mood and Affect: Mood normal.           Lab Results: I have reviewed the following results:  Recent Labs     05/14/25  0038 05/14/25  1002   HGB  --  9.0*   HCT  --  26.3*    SODIUM 131* 137   K 4.3 4.0   CL 98 104   CO2 23 26   BUN 28* 25   CREATININE 1.85* 1.22   GLUC 285* 147*   AST 8*  --    ALT 5*  --    ALB 3.4*  --    TBILI 1.07*  --    ALKPHOS 30*  --        Imaging Results Review: No pertinent imaging studies reviewed.  Other Study Results Review: No additional pertinent studies reviewed.    VTE Pharmacologic Prophylaxis: VTE covered by:    None      VTE Mechanical Prophylaxis: sequential compression device

## 2025-05-14 NOTE — ASSESSMENT & PLAN NOTE
"Has had acute blood loss anemia from days of ongoing gross hematuria.  Hemoglobin today is 7.6.  Type and screen completed for operation later today.  No transfusion to date. There is a large volume of tumor over taking the left upper pole of the collecting system.  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  He is scheduled for left nephro ureterectomy today with Dr Santacruz.  Staging CT chest abdomen pelvis with no evidence of metastatic disease. He is NPO today.    /63 (BP Location: Right arm)   Pulse 80   Temp (!) 97.4 °F (36.3 °C) (Oral)   Resp 20   Ht 5' 7\" (1.702 m)   SpO2 97%   BMI 29.73 kg/m²     Lab Results   Component Value Date    WBC 11.08 (H) 05/11/2025     Lab Results   Component Value Date    HGB 9.0 (L) 05/14/2025     Lab Results   Component Value Date    CREATININE 1.22 05/14/2025       "

## 2025-05-14 NOTE — ASSESSMENT & PLAN NOTE
History of HFrEF diabetes mellitus CAD who presented to [facility] for hematuria transferred to [place] underwent left uteroscopy with stent placement and biopsy concerning for urothelial cancer  CT imaging without evidence of metastatic disease  Patient declined chemotherapy but is agreeable for surgical removal  Transferred here underwent left nephroureterectomy 5/14/2025

## 2025-05-14 NOTE — UTILIZATION REVIEW
NOTIFICATION OF ADMISSION DISCHARGE   This is a Notification of Discharge from [facility]. Please be advised that this patient has been discharge from our facility. Below you will find the admission and discharge date and time including the patient’s disposition.   UTILIZATION REVIEW CONTACT:  Utilization Review Assistants  Network Utilization Review Department  Phone: [phone] x carefully listen to the prompts. All voicemails are confidential.  Email: [email or web address]     ADMISSION INFORMATION  PRESENTATION DATE: 5/10/2025  5:24 PM  OBERVATION ADMISSION DATE: N/A  INPATIENT ADMISSION DATE: 5/10/25  5:24 PM   DISCHARGE DATE: 5/13/2025  8:04 PM   DISPOSITION:[facility] Utilization Review Department  ATTENTION: Please call with any questions or concerns to [phone] and carefully listen to the prompts so that you are directed to the right person. All voicemails are confidential.   For Discharge needs, contact Care Management DC Support Team at [phone] opt. 2  Send all requests for admission clinical reviews, approved or denied determinations and any other requests to dedicated fax number below belonging to the campus where the patient is receiving treatment. List of dedicated fax numbers for the Facilities:  FACILITY NAME UR FAX NUMBER   ADMISSION DENIALS (Administrative/Medical Necessity) [phone]   DISCHARGE SUPPORT TEAM ([facility]) [phone]   PARENT CHILD HEALTH (Maternity/NICU/Pediatrics) [phone]   [facility] [phone]   [facility] [phone]   [facility] [phone]   [facility] [phone]   Critical access hospital [phone]   [facility] [phone]   [facility] [phone]   [facility] [phone]   [facility]  [facility] [phone]   [facility] [phone]   [facility] [phone]   [facility] [phone]

## 2025-05-14 NOTE — ANESTHESIA PROCEDURE NOTES
Arterial Line Insertion    Performed by: Shayy Martinez CRNA  Authorized by: Glen Gann MD  Consent: Written consent obtained  Risks and benefits: risks, benefits and alternatives were discussed  Consent given by: patient  Patient understanding: patient states understanding of the procedure being performed  Patient consent: the patient's understanding of the procedure matches consent given  Procedure consent: procedure consent matches procedure scheduled  Relevant documents: relevant documents present and verified  Test results: test results available and properly labeled  Site marked: the operative site was marked  Required items: required blood products, implants, devices, and special equipment available  Patient identity confirmed: verbally with patient, hospital-assigned identification number and arm band  Preparation: Patient was prepped and draped in the usual sterile fashion.  Indications: hemodynamic monitoring  Orientation:  Right  Location: radial artery  Procedure Details:      Line Type: Arterial Line  Santhosh's test normal: yes  Needle gauge: 20  Placement technique:  Ultrasound guided  Number of attempts: 1    Post-procedure:  Post-procedure: dressing applied  Waveform: good waveform and waveform confirmed  Patient tolerance: Patient tolerated the procedure well with no immediate complications

## 2025-05-14 NOTE — ASSESSMENT & PLAN NOTE
Wt Readings from Last 3 Encounters:   05/10/25 86.1 kg (189 lb 13.1 oz)   05/09/25 86.1 kg (189 lb 13.1 oz)   07/13/24 84.1 kg (185 lb 6.5 oz)     Follows with [facility] cardiology  Seen by [facility] cardiology at [facility]  GDMT: Prior to admission empagliflozin remains on hold.  Will decrease entresto and carvedilol dosing due to hypotension

## 2025-05-14 NOTE — ASSESSMENT & PLAN NOTE
Wt Readings from Last 3 Encounters:   05/10/25 86.1 kg (189 lb 13.1 oz)   05/09/25 86.1 kg (189 lb 13.1 oz)   07/13/24 84.1 kg (185 lb 6.5 oz)   No dyspnea, on room air.

## 2025-05-15 PROBLEM — I50.42 CHRONIC COMBINED SYSTOLIC AND DIASTOLIC HEART FAILURE (HCC): Chronic | Status: ACTIVE | Noted: 2022-11-29

## 2025-05-15 LAB
ABO GROUP BLD BPU: NORMAL
ANION GAP SERPL CALCULATED.3IONS-SCNC: 9 MMOL/L (ref 4–13)
BPU ID: NORMAL
BUN SERPL-MCNC: 29 MG/DL (ref 5–25)
CALCIUM SERPL-MCNC: 7.9 MG/DL (ref 8.4–10.2)
CHLORIDE SERPL-SCNC: 106 MMOL/L (ref 96–108)
CO2 SERPL-SCNC: 21 MMOL/L (ref 21–32)
CREAT SERPL-MCNC: 1.1 MG/DL (ref 0.6–1.3)
CROSSMATCH: NORMAL
ERYTHROCYTE [DISTWIDTH] IN BLOOD BY AUTOMATED COUNT: 13.9 % (ref 11.6–15.1)
GFR SERPL CREATININE-BSD FRML MDRD: 73 ML/MIN/1.73SQ M
GLUCOSE SERPL-MCNC: 167 MG/DL (ref 65–140)
GLUCOSE SERPL-MCNC: 180 MG/DL (ref 65–140)
GLUCOSE SERPL-MCNC: 199 MG/DL (ref 65–140)
GLUCOSE SERPL-MCNC: 221 MG/DL (ref 65–140)
GLUCOSE SERPL-MCNC: 265 MG/DL (ref 65–140)
HCT VFR BLD AUTO: 20.3 % (ref 36.5–49.3)
HCT VFR BLD AUTO: 31.5 % (ref 36.5–49.3)
HCT VFR BLD AUTO: 31.9 % (ref 36.5–49.3)
HGB BLD-MCNC: 10.4 G/DL (ref 12–17)
HGB BLD-MCNC: 10.5 G/DL (ref 12–17)
HGB BLD-MCNC: 6.5 G/DL (ref 12–17)
MCH RBC QN AUTO: 29.9 PG (ref 26.8–34.3)
MCHC RBC AUTO-ENTMCNC: 33 G/DL (ref 31.4–37.4)
MCV RBC AUTO: 91 FL (ref 82–98)
PLATELET # BLD AUTO: 189 THOUSANDS/UL (ref 149–390)
PMV BLD AUTO: 10.2 FL (ref 8.9–12.7)
POTASSIUM SERPL-SCNC: 4.9 MMOL/L (ref 3.5–5.3)
RBC # BLD AUTO: 3.48 MILLION/UL (ref 3.88–5.62)
SODIUM SERPL-SCNC: 136 MMOL/L (ref 135–147)
UNIT DISPENSE STATUS: NORMAL
UNIT PRODUCT CODE: NORMAL
UNIT PRODUCT VOLUME: 350 ML
UNIT RH: NORMAL
WBC # BLD AUTO: 9.14 THOUSAND/UL (ref 4.31–10.16)

## 2025-05-15 PROCEDURE — 85018 HEMOGLOBIN: CPT

## 2025-05-15 PROCEDURE — 99232 SBSQ HOSP IP/OBS MODERATE 35: CPT | Performed by: INTERNAL MEDICINE

## 2025-05-15 PROCEDURE — 99024 POSTOP FOLLOW-UP VISIT: CPT | Performed by: UROLOGY

## 2025-05-15 PROCEDURE — 82948 REAGENT STRIP/BLOOD GLUCOSE: CPT

## 2025-05-15 PROCEDURE — 30233N1 TRANSFUSION OF NONAUTOLOGOUS RED BLOOD CELLS INTO PERIPHERAL VEIN, PERCUTANEOUS APPROACH: ICD-10-PCS

## 2025-05-15 PROCEDURE — 85027 COMPLETE CBC AUTOMATED: CPT | Performed by: INTERNAL MEDICINE

## 2025-05-15 PROCEDURE — P9016 RBC LEUKOCYTES REDUCED: HCPCS

## 2025-05-15 PROCEDURE — 80048 BASIC METABOLIC PNL TOTAL CA: CPT | Performed by: UROLOGY

## 2025-05-15 PROCEDURE — 85014 HEMATOCRIT: CPT

## 2025-05-15 RX ORDER — INSULIN GLARGINE 100 [IU]/ML
5 INJECTION, SOLUTION SUBCUTANEOUS
Status: DISCONTINUED | OUTPATIENT
Start: 2025-05-15 | End: 2025-05-16

## 2025-05-15 RX ORDER — CARVEDILOL 3.12 MG/1
3.12 TABLET ORAL 2 TIMES DAILY WITH MEALS
Status: DISCONTINUED | OUTPATIENT
Start: 2025-05-15 | End: 2025-05-18 | Stop reason: HOSPADM

## 2025-05-15 RX ORDER — SODIUM CHLORIDE, SODIUM GLUCONATE, SODIUM ACETATE, POTASSIUM CHLORIDE, MAGNESIUM CHLORIDE, SODIUM PHOSPHATE, DIBASIC, AND POTASSIUM PHOSPHATE .53; .5; .37; .037; .03; .012; .00082 G/100ML; G/100ML; G/100ML; G/100ML; G/100ML; G/100ML; G/100ML
50 INJECTION, SOLUTION INTRAVENOUS CONTINUOUS
Status: DISPENSED | OUTPATIENT
Start: 2025-05-15 | End: 2025-05-16

## 2025-05-15 RX ADMIN — NICOTINE 1 PATCH: 14 PATCH, EXTENDED RELEASE TRANSDERMAL at 08:11

## 2025-05-15 RX ADMIN — OXYBUTYNIN CHLORIDE 5 MG: 5 TABLET ORAL at 08:10

## 2025-05-15 RX ADMIN — INSULIN GLARGINE 5 UNITS: 100 INJECTION, SOLUTION SUBCUTANEOUS at 21:13

## 2025-05-15 RX ADMIN — ACETAMINOPHEN 650 MG: 325 TABLET, FILM COATED ORAL at 07:25

## 2025-05-15 RX ADMIN — OXYBUTYNIN CHLORIDE 5 MG: 5 TABLET ORAL at 16:45

## 2025-05-15 RX ADMIN — SODIUM CHLORIDE, SODIUM GLUCONATE, SODIUM ACETATE, POTASSIUM CHLORIDE, MAGNESIUM CHLORIDE, SODIUM PHOSPHATE, DIBASIC, AND POTASSIUM PHOSPHATE 50 ML/HR: .53; .5; .37; .037; .03; .012; .00082 INJECTION, SOLUTION INTRAVENOUS at 18:17

## 2025-05-15 RX ADMIN — OXYCODONE 5 MG: 5 TABLET ORAL at 21:13

## 2025-05-15 RX ADMIN — FINASTERIDE 5 MG: 5 TABLET, FILM COATED ORAL at 08:10

## 2025-05-15 RX ADMIN — INSULIN LISPRO 3 UNITS: 100 INJECTION, SOLUTION INTRAVENOUS; SUBCUTANEOUS at 11:44

## 2025-05-15 RX ADMIN — INSULIN LISPRO 1 UNITS: 100 INJECTION, SOLUTION INTRAVENOUS; SUBCUTANEOUS at 21:13

## 2025-05-15 RX ADMIN — GABAPENTIN 100 MG: 100 CAPSULE ORAL at 08:10

## 2025-05-15 RX ADMIN — SODIUM CHLORIDE, SODIUM GLUCONATE, SODIUM ACETATE, POTASSIUM CHLORIDE, MAGNESIUM CHLORIDE, SODIUM PHOSPHATE, DIBASIC, AND POTASSIUM PHOSPHATE 100 ML/HR: .53; .5; .37; .037; .03; .012; .00082 INJECTION, SOLUTION INTRAVENOUS at 08:08

## 2025-05-15 RX ADMIN — ACETAMINOPHEN 650 MG: 325 TABLET, FILM COATED ORAL at 16:45

## 2025-05-15 RX ADMIN — DOCUSATE SODIUM 100 MG: 100 CAPSULE, LIQUID FILLED ORAL at 16:45

## 2025-05-15 RX ADMIN — POLYETHYLENE GLYCOL 3350 17 G: 17 POWDER, FOR SOLUTION ORAL at 08:09

## 2025-05-15 RX ADMIN — OXYCODONE 5 MG: 5 TABLET ORAL at 01:02

## 2025-05-15 RX ADMIN — OXYCODONE 5 MG: 5 TABLET ORAL at 11:54

## 2025-05-15 RX ADMIN — INSULIN LISPRO 2 UNITS: 100 INJECTION, SOLUTION INTRAVENOUS; SUBCUTANEOUS at 16:46

## 2025-05-15 RX ADMIN — DOCUSATE SODIUM 100 MG: 100 CAPSULE, LIQUID FILLED ORAL at 08:10

## 2025-05-15 RX ADMIN — ATORVASTATIN CALCIUM 40 MG: 40 TABLET, FILM COATED ORAL at 16:45

## 2025-05-15 RX ADMIN — OXYBUTYNIN CHLORIDE 5 MG: 5 TABLET ORAL at 21:13

## 2025-05-15 NOTE — APP STUDENT NOTE
YESSY STUDENT  Inpatient Progress Note for TRAINING ONLY  Not Part of Legal Medical Record       Progress Note - Jw Ray 59 y.o. male MRN: [id number]    Unit/Bed#: E5 -01 Encounter: [id number]      Assessment & Plan:  Urothelial cancer (HCC)   Presented to ED 5/09/2025 for abby hematuria  Cystoscopy on 5/11/2025 showed left upper tract high grade urothelial tumor   Left stent left in place on 5/11  CT c/a/p showed no evidence of metastatic disease.  Met with medical oncology and has elected not to proceed with any neoadjuvant chemotherapy   Required 2 blood transfusions in last 48 hours prior to surgical intervention   S/p left nephro-ureterectomy laparoscopic w/ robotic lymph node dissection  Moderate volume ascites, sent for cytology  ALISSA output 45 cc overnight, following movement this morning 290 cc of pink and serosanguineous in nature   Green catheter with 1500 cc output overnight of clear-yellow urine   Received 1 PRBCs 5/15 and Hgb improved from 6.5 to 10.4   Nephrology following  Monitor I/O  Continue bed rest and clear liquid diet  Complaining of constipation post-operatively, start Miralax and Colace    Chronic combined systolic and diastolic heart failure (HCC)   Continue carvedilol 25 mg daily   Continue Entresto daily   CAD  Continue atorvastatin 40 mg daily  Acute blood loss anemia  Received 1 unit of RBCs this morning and Hgb improved to 10.4  Repeat H&H at 1200, continue to monitor closely    Lab Results   Component Value Date/Time    HGB 10.4 (L) 05/15/2025 07:42 AM    HGB 6.5 (L) 05/14/2025 11:59 PM    HGB 8.9 (L) 05/14/2025 07:36 PM    HGB 8.8 (L) 05/14/2025 05:23 PM    HGB 8.9 (L) 05/14/2025 04:06 PM    HGB 6.5 (L) 05/14/2025 02:25 PM    HGB 9.0 (L) 05/14/2025 10:02 AM      Hyponatremia  Corrected, continue IVF    Lab Results   Component Value Date/Time    SODIUM 136 05/15/2025 06:18 AM    SODIUM 135 05/14/2025 07:36 PM    SODIUM 137 05/14/2025 10:02 AM    SODIUM 131 (L)  "05/14/2025 12:38 AM    SODIUM 130 (L) 05/13/2025 08:09 AM      Acute kidney injury  Continue IVF   Nephrology following    Lab Results   Component Value Date/Time    BUN 29 (H) 05/15/2025 06:18 AM    BUN 28 (H) 05/14/2025 07:36 PM    BUN 25 05/14/2025 10:02 AM    BUN 28 (H) 05/14/2025 12:38 AM    BUN 22 05/13/2025 08:09 AM     Lab Results   Component Value Date/Time    CREATININE 1.10 05/15/2025 06:18 AM    CREATININE 1.19 05/14/2025 07:36 PM    CREATININE 1.22 05/14/2025 10:02 AM    CREATININE 1.85 (H) 05/14/2025 12:38 AM    CREATININE 1.18 05/13/2025 08:09 AM     Lab Results   Component Value Date/Time    EGFR 73 05/15/2025 06:18 AM    EGFR 66 05/14/2025 07:36 PM    EGFR 64 05/14/2025 10:02 AM    EGFR 38 05/14/2025 12:38 AM            Type 2 diabetes mellitus (HCC)   Continue sliding scale insulin  Lab Results   Component Value Date/Time    POCGLU 265 (H) 05/15/2025 11:33 AM    POCGLU 167 (H) 05/15/2025 07:24 AM    POCGLU 216 (H) 05/14/2025 08:27 PM    HGBA1C 8 (H) 04/28/2025 11:27 AM        Subjective:   Patient laying comfortably in bed. Has some abdominal pain over surgical sites. Feels well. Has not had a BM since Wednesday. Green catheter in place. No ALISSA drain output overnight, but upon standing this morning, patient had 290 cc of pink serosanguinous fluid in the ALISSA drain. Denies fatigue, dizziness, or lightheadedness. Denies chest pain and shortness of breath. Patient is afebrile and denies night sweats or chills. Patient able to ambulate to the bathroom. Tolerating liquids.     Objective:     Vitals: Blood pressure 111/63, pulse 78, temperature 97.7 °F (36.5 °C), resp. rate 16, height 5' 7\" (1.702 m), SpO2 92%.,Body mass index is 29.73 kg/m².      Intake/Output Summary (Last 24 hours) at 5/15/2025 1111  Last data filed at 5/15/2025 1036  Gross per 24 hour   Intake 4650 ml   Output 3320 ml   Net 1330 ml       Physical Exam: Physical Exam  Constitutional:       General: He is not in acute distress.     " Appearance: Normal appearance. He is not ill-appearing or diaphoretic.   HENT:      Head: Normocephalic and atraumatic.     Eyes:      Extraocular Movements: Extraocular movements intact.      Pupils: Pupils are equal, round, and reactive to light.       Cardiovascular:      Rate and Rhythm: Normal rate and regular rhythm.      Heart sounds: No murmur heard.  Pulmonary:      Effort: Pulmonary effort is normal. No respiratory distress.      Breath sounds: Normal breath sounds.   Abdominal:      Palpations: Abdomen is soft.      Tenderness: There is abdominal tenderness.      Comments: Well approximated 3 inch abdominal midline incision and 3 laparoscopic incisions on the left abdomen at the left MCL intact. No surrounding erythema or warmth. ALISSA drain in place at the LLQ abdomen.      Musculoskeletal:      Cervical back: Normal range of motion and neck supple.     Skin:     General: Skin is warm and dry.     Neurological:      General: No focal deficit present.      Mental Status: He is alert and oriented to person, place, and time.     Psychiatric:         Mood and Affect: Mood normal.         Behavior: Behavior normal.          Invasive Devices       Peripheral Intravenous Line  Duration             Peripheral IV 05/11/25 Dorsal (posterior);Right Forearm 4 days    Peripheral IV 05/14/25 Left Forearm <1 day    Peripheral IV 05/14/25 Left Wrist <1 day              Drain  Duration             Closed/Suction Drain LLQ Bulb 19 Fr. <1 day    Urethral Catheter 24 Fr. <1 day                      VTE Pharmacologic Prophylaxis: RX contraindicated due to: bleeding  VTE Mechanical Prophylaxis: sequential compression device

## 2025-05-15 NOTE — CASE MANAGEMENT
Case Management Discharge Planning Note    Patient name Jw Ray  Location [facility] /E5 -* MRN [id number]  : 1965 Date 5/15/2025       Current Admission Date: 2025  Current Admission Diagnosis:Urothelial cancer (HCC)   Patient Active Problem List    Diagnosis Date Noted    Acute kidney injury (HCC) 2025    Hyponatremia 2025    ABLA (acute blood loss anemia) 05/10/2025    Hydronephrosis of left kidney 2025    Benign prostatic hyperplasia with urinary retention 2024    Urothelial cancer (HCC) 2023    Pure hypercholesterolemia 2023    Chronic combined systolic and diastolic heart failure (HCC) 2022    Vitamin D deficiency 2020    Presence of stent in coronary artery 2020    Ischemic cardiomyopathy 10/29/2018    CAD (coronary artery disease) 2013    Essential hypertension 2013    Type 2 diabetes mellitus (HCC) 2011    Long term current use of anticoagulant therapy 12/15/2010      LOS (days): 2  Geometric Mean LOS (GMLOS) (days): 2.6  Days to GMLOS:0.9     OBJECTIVE:  Risk of Unplanned Readmission Score: 23.99         Current admission status: Inpatient   Preferred Pharmacy:   [facility] [address] [place] [address] [place] - [address]  [address] [facility]   Phone: [phone] Fax: [phone]    [facility] - [place] [facility]  [place] [address]  Phone: [phone] Fax: [phone]    Primary Care Provider: Thanh Echeverria MD    Primary Insurance: BLUE CROSS  Secondary Insurance:     DISCHARGE DETAILS:               Additional Comments: Patient and spouse refusing need for HHC to manage the brewer and the ALISSA drin (if discharged w/ drain) advised they could take care it themselves, have done it in the past.

## 2025-05-15 NOTE — ASSESSMENT & PLAN NOTE
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   Staging CT chest abdomen pelvis with no evidence of metastatic disease.  Required 2 blood transfusions in the last 48 hrs prior to surgical intervention   S/p left nephro ureterectomy 5/14 by Dr Santacruz  Moderate volume ascites -- sent for cytology   ALISSA output 45 cc overnight --> following movement this AM, output increased, 290 cc thus far pink and serosanguineous in nature  Green catheter with 1500 cc output overnight; clear yellow urine  Mild pain at surgical sites, overall well-controlled  Hgb significantly decreased, 6.5 this morning  Patient received 1 unit PRBC (5/15) --> Hgb improved to 10.4    /63 (BP Location: Left arm)   Pulse 78   Temp 97.7 °F (36.5 °C)   Resp 16   Ht 5' 7\" (1.702 m)   SpO2 92%   BMI 29.73 kg/m²     Lab Results   Component Value Date    WBC 9.14 05/15/2025     Lab Results   Component Value Date    HGB 10.4 (L) 05/15/2025     Lab Results   Component Value Date    CREATININE 1.10 05/15/2025     Plan:  Bed rest for time being   Clear liquid diet - no plan for repeat intervention at this time   No need for further imaging at this time  Continue oral analgesia as needed for pain  Maintain Green catheter to straight drainage  Maintain ALISSA drain and document output and appearance  Repeat H&H ordered for 1200, continue to monitor closely  Continue to monitor vitals, creatinine, and WBC closely  "

## 2025-05-15 NOTE — ASSESSMENT & PLAN NOTE
Lab Results   Component Value Date    HGBA1C 8 (H) 04/28/2025       Recent Labs     05/14/25  1111 05/14/25  1917 05/14/25  2027 05/15/25  0724   POCGLU 150* 201* 216* 167*       Blood Sugar Average: Last 72 hrs:  (P) [id number]

## 2025-05-15 NOTE — PROGRESS NOTES
I-STAT results don’t appear to be crossing over into EPIC lab results area. Refer to patients intra-op record for I-STAT results listed under “Abg results” in the events section. One was drawn after induction of anesthesia and the second was done post 1 unit PRBC.

## 2025-05-15 NOTE — ANESTHESIA POSTPROCEDURE EVALUATION
Post-Op Assessment Note    CV Status:  Stable    Pain management: adequate       Mental Status:  Awake   Hydration Status:  Stable   PONV Controlled:  Controlled   Airway Patency:  Patent     Post Op Vitals Reviewed: Yes    No anethesia notable event occurred.    Staff: Anesthesiologist           Last Filed PACU Vitals:  Vitals Value Taken Time   Temp 98.4 °F (36.9 °C) 05/14/25 19:41   Pulse 84 05/14/25 19:53   /58 05/14/25 19:41   Resp 12 05/14/25 19:41   SpO2 93 % 05/14/25 19:53   Vitals shown include unfiled device data.    Modified Ruddy:     Vitals Value Taken Time   Activity 2 05/14/25 19:11   Respiration 2 05/14/25 19:11   Circulation 2 05/14/25 19:11   Consciousness 1 05/14/25 19:11   Oxygen Saturation 1 05/14/25 19:11     Modified Ruddy Score: 8

## 2025-05-15 NOTE — QUICK NOTE
Nursing reached out that pt hgb 6.5. Vital signs stable. Blood consent in chart. One unit of blood ordered at this time.

## 2025-05-15 NOTE — PROGRESS NOTES
"Progress Note - Urology   Name: Jw Ray 59 y.o. male I MRN: [id number]  Unit/Bed#: E5 -01 I Date of Admission: 5/13/2025   Date of Service: 5/15/2025 I Hospital Day: 2    Assessment & Plan  Urothelial cancer (HCC)  Has had acute blood loss anemia from days of ongoing gross hematuria  Imaging reveals large volume of tumor over taking the left upper pole of the collecting system  Left stent in place since 5/11  Patient appears to have high-grade large-volume upper tract urothelial cancer of the left kidney he has met with medical oncology and has elected to not proceed with any neoadjuvant chemotherapy.   Staging CT chest abdomen pelvis with no evidence of metastatic disease.  Required 2 blood transfusions in the last 48 hrs prior to surgical intervention   S/p left nephro ureterectomy 5/14 by Dr Santacruz  Moderate volume ascites -- sent for cytology   ALISSA output 45 cc overnight --> following movement this AM, output increased, 290 cc thus far pink and serosanguineous in nature  Green catheter with 1500 cc output overnight; clear yellow urine  Mild pain at surgical sites, overall well-controlled  Hgb significantly decreased, 6.5 this morning  Patient received 1 unit PRBC (5/15) --> Hgb improved to 10.4    /63 (BP Location: Left arm)   Pulse 78   Temp 97.7 °F (36.5 °C)   Resp 16   Ht 5' 7\" (1.702 m)   SpO2 92%   BMI 29.73 kg/m²     Lab Results   Component Value Date    WBC 9.14 05/15/2025     Lab Results   Component Value Date    HGB 10.4 (L) 05/15/2025     Lab Results   Component Value Date    CREATININE 1.10 05/15/2025     Plan:  Bed rest for time being   Clear liquid diet - no plan for repeat intervention at this time   No need for further imaging at this time  Continue oral analgesia as needed for pain  Maintain Green catheter to straight drainage  Maintain ALISSA drain and document output and appearance  Repeat H&H ordered for 1200, continue to monitor closely  Continue to monitor vitals, " creatinine, and WBC closely  Chronic combined systolic and diastolic heart failure (HCC)  Wt Readings from Last 3 Encounters:   05/10/25 86.1 kg (189 lb 13.1 oz)   05/09/25 86.1 kg (189 lb 13.1 oz)   07/13/24 84.1 kg (185 lb 6.5 oz)   No dyspnea, on room air.    Type 2 diabetes mellitus (HCC)  Lab Results   Component Value Date    HGBA1C 8 (H) 04/28/2025       Recent Labs     05/14/25  1111 05/14/25  1917 05/14/25  2027 05/15/25  0724   POCGLU 150* 201* 216* 167*       Blood Sugar Average: Last 72 hrs:  (P) [id number]      Urology service will follow.    Yamel Leyva is POD #1 s/p left robotic nephro ureterectomy and lymph node dissection completed by Dr. Santacruz.  No issues reported overnight per nursing.  VSS, he remains afebrile.  Green catheter remains in place draining clear yellow urine.  Left lower quadrant ALISSA drain with 45 cc output overnight, no drainage noted surrounding the site.  A.m. Hgb decreased to 6.5.  1 unit PRBC ordered per internal medicine team.  Updated Hgb has stabilized now at 10.4.    Today on exam the patient appears well, laying In bed comfortable in no acute distress.  He reports mild pain located at the surgical sites but is overall surprised that he feels so well.  He denies chest pain, shortness of breath, fever/chills.  Patient ambulated this morning to the bathroom, reports increased ALISSA output following movement. According to chart review 290 cc of serosanguineous pink fluid output so far this morning.    Objective :  Temp:  [97.7 °F (36.5 °C)-98.7 °F (37.1 °C)] 97.7 °F (36.5 °C)  HR:  [78-88] 78  BP: ()/(54-68) 111/63  Resp:  [12-18] 16  SpO2:  [91 %-97 %] 92 %  O2 Device: None (Room air)  Nasal Cannula O2 Flow Rate (L/min):  [2 L/min-3 L/min] 2 L/min    I/O         05/13 0701  05/14 0700 05/14 0701  05/15 0700 05/15 0701  05/16 0700    P.O.   450    I.V.  3000     Blood 527.5 700     IV Piggyback  500     Total Intake 527.5 4200 450    Urine 2600 4570      Drains  45     Blood  300     Total Output 2600 4915     Net -2072.5 -715 +450                 Lines/Drains/Airways       Active Status       Name Placement date Placement time Site Days    Closed/Suction Drain LLQ Bulb 19 Fr. 05/14/25  1815  LLQ  less than 1    Urethral Catheter 24 Fr. 05/14/25  1432  --  less than 1                  Physical Exam  Vitals and nursing note reviewed.   Constitutional:       General: He is not in acute distress.     Appearance: Normal appearance. He is well-developed. He is not ill-appearing.   HENT:      Head: Normocephalic and atraumatic.     Eyes:      Conjunctiva/sclera: Conjunctivae normal.       Cardiovascular:      Rate and Rhythm: Normal rate and regular rhythm.      Heart sounds: No murmur heard.  Pulmonary:      Effort: Pulmonary effort is normal. No respiratory distress.      Breath sounds: Normal breath sounds.   Abdominal:      General: Abdomen is flat. There is no distension.      Palpations: Abdomen is soft.      Tenderness: There is abdominal tenderness (at surgical sites). There is no right CVA tenderness or left CVA tenderness.      Comments: Abdomen is soft and nondistended.  He reports pain along surgical sites but is overall well-controlled.  Surgical sites are clean and dry, well-approximated.  No erythema or discharge noted.  Left lower quadrant ALISSA drain with pink serosanguineous fluid, mucus throughout.   Genitourinary:     Comments: Green remains inserted draining clear yellow urine    Musculoskeletal:         General: No swelling.      Cervical back: Neck supple.     Skin:     General: Skin is warm and dry.      Capillary Refill: Capillary refill takes less than 2 seconds.     Neurological:      Mental Status: He is alert and oriented to person, place, and time.     Psychiatric:         Mood and Affect: Mood normal.           Lab Results: I have reviewed the following results:  Recent Labs     05/14/25  0038 05/14/25  1002 05/14/25  1723 05/14/25  1936  05/15/25  0618 05/15/25  0742   WBC  --   --   --   --   --  9.14   HGB  --    < > 8.8*   < >  --  10.4*   HCT  --    < > 26*   < >  --  31.5*   PLT  --   --   --   --   --  189   SODIUM 131*   < >  --    < > 136  --    K 4.3   < >  --    < > 4.9  --    CL 98   < >  --    < > 106  --    CO2 23   < > 21   < > 21  --    BUN 28*   < >  --    < > 29*  --    CREATININE 1.85*   < >  --    < > 1.10  --    GLUC 285*   < >  --    < > 199*  --    CAIONIZED  --    < > 1.08*  --   --   --    AST 8*  --   --   --   --   --    ALT 5*  --   --   --   --   --    ALB 3.4*  --   --   --   --   --    TBILI 1.07*  --   --   --   --   --    ALKPHOS 30*  --   --   --   --   --     < > = values in this interval not displayed.       Imaging Results Review: No pertinent imaging studies reviewed.  Other Study Results Review: No additional pertinent studies reviewed.    VTE Pharmacologic Prophylaxis: VTE covered by:    None      VTE Mechanical Prophylaxis: sequential compression device

## 2025-05-15 NOTE — QUICK NOTE
Upon review of the patient's chart this morning I saw his hemoglobin had dropped to 6.5 at midnight which was significant decrease from 8.9 immediately postoperatively at about 7:30 PM.    The patient had a hemoglobin of 6.5 checked by anesthesia right before surgery resulting in 1 unit of blood given during surgery.  His blood count was checked during and after surgery and was 8.9 (which of note was a more than expected bump in hemoglobin with 1 unit for patient weighing 86 kg).  EBL during the surgery was approximately 300 mL and there was no signs of bleeding at the end of the case including when pressure was turned down to evaluate for this.    The patient received 1 unit of blood overnight which was completed about 3 in the morning.    I spoke with the nurse who had the patient since 3 AM.  ALISSA output has been relatively low (45cc).  I asked her to examine the patient and there does not appear to be sign or drainage around the ALISSA drain.  At this point it is unclear to me if the patient is bleeding what the source is.    I asked her to push up the recheck of hemoglobin posttransfusion.  Depending on what this shows there may be a role for CT imaging to evaluate for bleeding.  There may also be a role for intervention and therefore I asked the patient's nurse to also make him n.p.o.

## 2025-05-15 NOTE — NURSING NOTE
Dr. Santacruz asked me to call his phone  for update on patient. Advised H&H is ordered to be checked q 8 hours and this is why it was checked and 1 unit of blood ordered. Advised received patient at 0300 and that time patietn had no complaints and vital were stable. Confirm no out put out of melania drain for my shift 0300-700. Provider asked me to inspect patients  abdomen, advised soft with no bruising and patient reports tenderness LRQ. Site around melania site also intact with small drainage on bandage. Provider asked  that H&H be drawn now  nad picture of melania drain be added to chart.

## 2025-05-15 NOTE — PROGRESS NOTES
Progress Note - Hospitalist   Name: Jw Ray 59 y.o. male I MRN: [id number]  Unit/Bed#: E5 -01 I Date of Admission: 5/13/2025   Date of Service: 5/15/2025 I Hospital Day: 2    Assessment & Plan  Urothelial cancer (HCC)  History of HFrEF diabetes mellitus CAD who presented to [facility] for hematuria transferred to [place] underwent left uteroscopy with stent placement and biopsy concerning for urothelial cancer  CT imaging without evidence of metastatic disease  Patient declined chemotherapy but is agreeable for surgical removal  Transferred here underwent left nephroureterectomy 5/14/2025  ABLA (acute blood loss anemia)  Secondary to gross hematuria and postoperative anemia  Has been transfused a total of 3 units PRBC since admission    Results from last 7 days   Lab Units 05/15/25  1240 05/15/25  0742 05/14/25  2359 05/14/25  1936   HEMOGLOBIN g/dL 10.5* 10.4* 6.5* 8.9*     Acute kidney injury (HCC)  Acute kidney injury improved with IV fluids  Monitor volume status closely given combined CHF    Results from last 7 days   Lab Units 05/15/25  0618 05/14/25  1936 05/14/25  1002 05/14/25  0038 05/13/25  0809 05/12/25  0523 05/11/25  0512 05/10/25  0546 05/09/25  1113   BUN mg/dL 29* 28* 25 28* 22 24 21 19 17   CREATININE mg/dL 1.10 1.19 1.22 1.85* 1.18 1.11 0.84 0.97 0.94   EGFR ml/min/1.73sq m 73 66 64 38 67 72 95 85 88     Chronic combined systolic and diastolic heart failure (HCC)  Wt Readings from Last 3 Encounters:   05/10/25 86.1 kg (189 lb 13.1 oz)   05/09/25 86.1 kg (189 lb 13.1 oz)   07/13/24 84.1 kg (185 lb 6.5 oz)     Follows with [facility] cardiology  Seen by  [facility] at [facility]  GDMT: Prior to admission empagliflozin remains on hold.  Will decrease entresto and carvedilol dosing due to hypotension  CAD (coronary artery disease)  CAD with history of stenting follows with [facility] cardiology  Aspirin on hold due to hematuria  Type 2 diabetes mellitus  ([facility])  Lab Results   Component Value Date    HGBA1C 8 (H) 2025     Recent Labs     25  1917 05/14/25  2027 05/15/25  0724 05/15/25  1133   POCGLU 201* 216* 167* 265*     Prior to admission on glyburide metformin empagliflozin and semaglutide  Currently on insulin sliding scale.  Restart glyburide tomorrow if no significant renal dysfunction.    Will order low-dose glargine 5 units this evening  Hyponatremia  Resolved    Results from last 7 days   Lab Units 05/15/25  0618 25  1936 25  1002 25  0038   SODIUM mmol/L 136 135 137 131*       VTE Pharmacologic Prophylaxis:   Moderate Risk (Score 3-4) - Pharmacological DVT Prophylaxis Contraindicated. Sequential Compression Devices Ordered.    Mobility:   Basic Mobility Inpatient Raw Score: 24  JH-HLM Goal: 8: Walk 250 feet or more  JH-HLM Achieved: 5: Stand (1 or more minutes)  JH-HLM Goal NOT achieved. Continue with multidisciplinary rounding and encourage appropriate mobility to improve upon JH-HLM goals.    Patient Centered Rounds: I have performed bedside rounds with nursing staff today.  Discussions with Specialists or Other Care Team Provider: Case management and urology    Education and Discussions with Family / Patient: Updated  (wife) at bedside.    Current Length of Stay: 2 day(s)  Current Patient Status: Inpatient   Certification Statement: The patient will continue to require additional inpatient hospital stay due to postoperative management  Discharge Plan: Anticipate discharge in 48-72 hrs to home.    Code Status: Level 1 - Full Code    Subjective   Patient seen and examined.  Postop day 1 left nephroureterectomy.  Blood loss anemia noted    Objective   Vitals:   Temp (24hrs), Av.3 °F (36.8 °C), Min:97.7 °F (36.5 °C), Max:98.7 °F (37.1 °C)    Temp:  [97.7 °F (36.5 °C)-98.7 °F (37.1 °C)] 97.7 °F (36.5 °C)  HR:  [78-88] 78  Resp:  [12-18] 16  BP: ()/(54-68) 111/63  SpO2:  [91 %-97 %] 92 %  Body mass index is  29.73 kg/m².     Input and Output Summary (last 24 hours):     Intake/Output Summary (Last 24 hours) at 5/15/2025 1507  Last data filed at 5/15/2025 1333  Gross per 24 hour   Intake 2900 ml   Output 2780 ml   Net 120 ml       Physical Exam  Vitals reviewed.   Constitutional:       General: He is not in acute distress.  HENT:      Head: Atraumatic.     Cardiovascular:      Rate and Rhythm: Regular rhythm.      Heart sounds: Normal heart sounds.   Pulmonary:      Effort: Pulmonary effort is normal.      Breath sounds: Decreased breath sounds present. No wheezing.   Abdominal:      General: Bowel sounds are normal.      Palpations: Abdomen is soft.      Tenderness: There is no abdominal tenderness.     Musculoskeletal:         General: No swelling or tenderness.     Skin:     General: Skin is warm and dry.     Neurological:      General: No focal deficit present.      Mental Status: He is alert and oriented to person, place, and time.      Cranial Nerves: No cranial nerve deficit.     Psychiatric:         Mood and Affect: Mood normal.       Lines/Drains:  Invasive Devices       Peripheral Intravenous Line  Duration             Peripheral IV 05/11/25 Dorsal (posterior);Right Forearm 4 days    Peripheral IV 05/14/25 Left Forearm 1 day    Peripheral IV 05/14/25 Left Wrist 1 day              Drain  Duration             Urethral Catheter 24 Fr. 1 day    Closed/Suction Drain LLQ Bulb 19 Fr. <1 day                  Urinary Catheter:  Goal for removal:                  Lab Results: I have reviewed the following results:   Results from last 7 days   Lab Units 05/15/25  1240 05/15/25  0742 05/14/25  2359 05/11/25  0756 05/11/25  0512 05/10/25  1605 05/10/25  0546 05/09/25  1616 05/09/25  1113   WBC Thousand/uL  --  9.14  --   --  11.08*  --  11.51*  --  10.20*   HEMOGLOBIN g/dL 10.5* 10.4* 6.5*   < > 10.1*   < > 10.8*   < > 13.3   I STAT HEMOGLOBIN   --   --   --    < >  --   --   --   --   --    PLATELETS Thousands/uL  --  189   --   --  160  --  166  --  201   MCV fL  --  91  --   --  90  --  89  --  90   INR   --   --   --   --   --   --   --   --  0.99    < > = values in this interval not displayed.     Results from last 7 days   Lab Units 05/15/25  0618 05/14/25  1936 05/14/25  1723 05/14/25  1425 05/14/25  1002 05/14/25  0038 05/11/25  0512 05/10/25  0546 05/09/25  1113   SODIUM mmol/L 136 135  --   --  137 131*   < > 137 136   POTASSIUM mmol/L 4.9 5.2  --   --  4.0 4.3   < > 4.1 4.5   CHLORIDE mmol/L 106 106  --   --  104 98   < > 105 104   CO2 mmol/L 21 21  --   --  26 23   < > 24 23   CO2, I-STAT mmol/L  --   --  21   < >  --   --   --   --   --    ANION GAP mmol/L 9 8  --   --  7 10   < > 8 9   BUN mg/dL 29* 28*  --   --  25 28*   < > 19 17   CREATININE mg/dL 1.10 1.19  --   --  1.22 1.85*   < > 0.97 0.94   CALCIUM mg/dL 7.9* 7.8*  --   --  7.8* 8.0*   < > 8.2* 9.1   ALBUMIN g/dL  --   --   --   --   --  3.4*  --  3.7 4.6   TOTAL BILIRUBIN mg/dL  --   --   --   --   --  1.07*  --  0.52 0.60   ALK PHOS U/L  --   --   --   --   --  30*  --  34 41   ALT U/L  --   --   --   --   --  5*  --  8 11   AST U/L  --   --   --   --   --  8*  --  9* 12*   EGFR ml/min/1.73sq m 73 66  --   --  64 38   < > 85 88   GLUCOSE RANDOM mg/dL 199* 226*  --   --  147* 285*   < > 140 190*    < > = values in this interval not displayed.                          Results from last 7 days   Lab Units 05/15/25  1133 05/15/25  0724 05/14/25  2027 05/14/25  1917 05/14/25  1111 05/14/25  0735 05/13/25  2146 05/13/25  1647 05/13/25  1023 05/13/25  0623 05/12/25  2105 05/12/25  1647   POC GLUCOSE mg/dl 265* 167* 216* 201* 150* 138 287* 203* 237* 200* 174* 203*               Recent Cultures (last 7 days):   Results from last 7 days   Lab Units 05/09/25  1116   URINE CULTURE  No Growth <1000 cfu/mL       Imaging:  Reviewed radiology reports from this admission including:  No results found.    Last 24 Hours Medication List:     Current Facility-Administered Medications:      acetaminophen (TYLENOL) tablet 650 mg, Q6H PRN    atorvastatin (LIPITOR) tablet 40 mg, Daily With Dinner    bisacodyl (DULCOLAX) rectal suppository 10 mg, Daily PRN    carvedilol (COREG) tablet 25 mg, BID With Meals    docusate sodium (COLACE) capsule 100 mg, BID    finasteride (PROSCAR) tablet 5 mg, Daily    gabapentin (NEURONTIN) capsule 100 mg, Daily    insulin lispro (HumALOG/ADMELOG) 100 units/mL subcutaneous injection 1-6 Units, TID AC **AND** Fingerstick Glucose (POCT), TID AC    insulin lispro (HumALOG/ADMELOG) 100 units/mL subcutaneous injection 1-6 Units, HS    multi-electrolyte (Plasmalyte-A/Isolyte-S PH 7.4/Normosol-R) IV solution, Continuous, Last Rate: 100 mL/hr (05/15/25 0808)    nicotine (NICODERM CQ) 14 mg/24hr TD 24 hr patch 1 patch, Daily    ondansetron (ZOFRAN) injection 4 mg, Q6H PRN    oxybutynin (DITROPAN) tablet 5 mg, TID    oxyCODONE (ROXICODONE) IR tablet 5 mg, Q4H PRN    polyethylene glycol (MIRALAX) packet 17 g, Daily    sacubitril-valsartan (ENTRESTO) 49-51 MG per tablet 1 tablet, BID    Administrative Statements   Today, Patient Was Seen By: Diogenes Mcrae, DO  I have spent a total time of 40 minutes in caring for this patient on the day of the visit/encounter including Patient and family education, Counseling / Coordination of care, Documenting in the medical record, Reviewing/placing orders in the medical record (including tests, medications, and/or procedures), and Communicating with other healthcare professionals .    **Please Note: This note may have been constructed using a voice recognition system.**

## 2025-05-16 LAB
ANION GAP SERPL CALCULATED.3IONS-SCNC: 6 MMOL/L (ref 4–13)
BUN SERPL-MCNC: 19 MG/DL (ref 5–25)
CALCIUM SERPL-MCNC: 7.5 MG/DL (ref 8.4–10.2)
CHLORIDE SERPL-SCNC: 105 MMOL/L (ref 96–108)
CO2 SERPL-SCNC: 25 MMOL/L (ref 21–32)
CREAT FLD-MCNC: 0.95 MG/DL
CREAT SERPL-MCNC: 0.86 MG/DL (ref 0.6–1.3)
GFR SERPL CREATININE-BSD FRML MDRD: 94 ML/MIN/1.73SQ M
GLUCOSE SERPL-MCNC: 161 MG/DL (ref 65–140)
GLUCOSE SERPL-MCNC: 163 MG/DL (ref 65–140)
GLUCOSE SERPL-MCNC: 194 MG/DL (ref 65–140)
GLUCOSE SERPL-MCNC: 200 MG/DL (ref 65–140)
GLUCOSE SERPL-MCNC: 223 MG/DL (ref 65–140)
HCT VFR BLD AUTO: 30.9 % (ref 36.5–49.3)
HGB BLD-MCNC: 10.4 G/DL (ref 12–17)
POTASSIUM SERPL-SCNC: 4 MMOL/L (ref 3.5–5.3)
SODIUM SERPL-SCNC: 136 MMOL/L (ref 135–147)

## 2025-05-16 PROCEDURE — 80048 BASIC METABOLIC PNL TOTAL CA: CPT | Performed by: INTERNAL MEDICINE

## 2025-05-16 PROCEDURE — 82570 ASSAY OF URINE CREATININE: CPT

## 2025-05-16 PROCEDURE — 99232 SBSQ HOSP IP/OBS MODERATE 35: CPT | Performed by: INTERNAL MEDICINE

## 2025-05-16 PROCEDURE — 82948 REAGENT STRIP/BLOOD GLUCOSE: CPT

## 2025-05-16 PROCEDURE — 85014 HEMATOCRIT: CPT

## 2025-05-16 PROCEDURE — 99024 POSTOP FOLLOW-UP VISIT: CPT | Performed by: UROLOGY

## 2025-05-16 PROCEDURE — 85018 HEMOGLOBIN: CPT

## 2025-05-16 RX ORDER — HYDROMORPHONE HCL/PF 1 MG/ML
0.5 SYRINGE (ML) INJECTION EVERY 4 HOURS PRN
Status: DISCONTINUED | OUTPATIENT
Start: 2025-05-16 | End: 2025-05-16

## 2025-05-16 RX ORDER — GLYBURIDE 1.25 MG/1
2.5 TABLET ORAL
Status: DISCONTINUED | OUTPATIENT
Start: 2025-05-16 | End: 2025-05-18 | Stop reason: HOSPADM

## 2025-05-16 RX ORDER — HYDROMORPHONE HCL/PF 1 MG/ML
0.5 SYRINGE (ML) INJECTION EVERY 4 HOURS PRN
Status: DISCONTINUED | OUTPATIENT
Start: 2025-05-16 | End: 2025-05-18

## 2025-05-16 RX ORDER — ENOXAPARIN SODIUM 100 MG/ML
40 INJECTION SUBCUTANEOUS
Status: DISCONTINUED | OUTPATIENT
Start: 2025-05-16 | End: 2025-05-18 | Stop reason: HOSPADM

## 2025-05-16 RX ORDER — SIMETHICONE 80 MG
80 TABLET,CHEWABLE ORAL EVERY 6 HOURS PRN
Status: DISCONTINUED | OUTPATIENT
Start: 2025-05-16 | End: 2025-05-18 | Stop reason: HOSPADM

## 2025-05-16 RX ADMIN — OXYCODONE 5 MG: 5 TABLET ORAL at 09:00

## 2025-05-16 RX ADMIN — ATORVASTATIN CALCIUM 40 MG: 40 TABLET, FILM COATED ORAL at 16:34

## 2025-05-16 RX ADMIN — CARVEDILOL 3.12 MG: 3.12 TABLET, FILM COATED ORAL at 07:51

## 2025-05-16 RX ADMIN — OXYCODONE 5 MG: 5 TABLET ORAL at 13:54

## 2025-05-16 RX ADMIN — DOCUSATE SODIUM 100 MG: 100 CAPSULE, LIQUID FILLED ORAL at 09:01

## 2025-05-16 RX ADMIN — OXYBUTYNIN CHLORIDE 5 MG: 5 TABLET ORAL at 16:34

## 2025-05-16 RX ADMIN — GABAPENTIN 100 MG: 100 CAPSULE ORAL at 09:01

## 2025-05-16 RX ADMIN — INSULIN LISPRO 1 UNITS: 100 INJECTION, SOLUTION INTRAVENOUS; SUBCUTANEOUS at 07:51

## 2025-05-16 RX ADMIN — HYDROMORPHONE HYDROCHLORIDE 0.5 MG: 1 INJECTION, SOLUTION INTRAMUSCULAR; INTRAVENOUS; SUBCUTANEOUS at 00:25

## 2025-05-16 RX ADMIN — NICOTINE 1 PATCH: 14 PATCH, EXTENDED RELEASE TRANSDERMAL at 09:00

## 2025-05-16 RX ADMIN — OXYBUTYNIN CHLORIDE 5 MG: 5 TABLET ORAL at 09:00

## 2025-05-16 RX ADMIN — OXYCODONE 5 MG: 5 TABLET ORAL at 04:29

## 2025-05-16 RX ADMIN — EMPAGLIFLOZIN 10 MG: 10 TABLET, FILM COATED ORAL at 13:51

## 2025-05-16 RX ADMIN — FINASTERIDE 5 MG: 5 TABLET, FILM COATED ORAL at 09:01

## 2025-05-16 RX ADMIN — SACUBITRIL AND VALSARTAN 1 TABLET: 24; 26 TABLET, FILM COATED ORAL at 18:12

## 2025-05-16 RX ADMIN — CARVEDILOL 3.12 MG: 3.12 TABLET, FILM COATED ORAL at 16:34

## 2025-05-16 RX ADMIN — GLYBURIDE 2.5 MG: 1.25 TABLET ORAL at 13:51

## 2025-05-16 RX ADMIN — INSULIN LISPRO 2 UNITS: 100 INJECTION, SOLUTION INTRAVENOUS; SUBCUTANEOUS at 12:14

## 2025-05-16 RX ADMIN — BISACODYL 10 MG: 10 SUPPOSITORY RECTAL at 22:31

## 2025-05-16 RX ADMIN — INSULIN LISPRO 2 UNITS: 100 INJECTION, SOLUTION INTRAVENOUS; SUBCUTANEOUS at 22:35

## 2025-05-16 RX ADMIN — OXYBUTYNIN CHLORIDE 5 MG: 5 TABLET ORAL at 22:30

## 2025-05-16 RX ADMIN — POLYETHYLENE GLYCOL 3350 17 G: 17 POWDER, FOR SOLUTION ORAL at 09:01

## 2025-05-16 RX ADMIN — SACUBITRIL AND VALSARTAN 1 TABLET: 24; 26 TABLET, FILM COATED ORAL at 09:02

## 2025-05-16 RX ADMIN — HYDROMORPHONE HYDROCHLORIDE 0.5 MG: 1 INJECTION, SOLUTION INTRAMUSCULAR; INTRAVENOUS; SUBCUTANEOUS at 16:59

## 2025-05-16 RX ADMIN — ENOXAPARIN SODIUM 40 MG: 40 INJECTION SUBCUTANEOUS at 12:14

## 2025-05-16 RX ADMIN — INSULIN LISPRO 2 UNITS: 100 INJECTION, SOLUTION INTRAVENOUS; SUBCUTANEOUS at 16:34

## 2025-05-16 RX ADMIN — DOCUSATE SODIUM 100 MG: 100 CAPSULE, LIQUID FILLED ORAL at 18:12

## 2025-05-16 NOTE — APP STUDENT NOTE
YESSY STUDENT  Inpatient Progress Note for TRAINING ONLY  Not Part of Legal Medical Record       Progress Note - Jw Ray 59 y.o. male MRN: [id number]    Unit/Bed#: E5 -01 Encounter: [id number]      Assessment & Plan:  Urothelial Cancer  PMH of HFrEF, CAD, and diabetes mellitus. Presented to ED 5/09 for gross hematuria. Underwent left ureteroscopy with stent placement and biopsy with findings concerning for high grade urothelial cancer.   CT c/a/p showed no findings concerning for metastasis  Patient declined chemotherapy  Underwent left nephroureterectomy on 5/14/2025 performed by Dr. Santacruz.   ALISSA drain in place with serosanguinous output.   ALISSA fluid creatinine sent, results pending  Patient with significant ascites during procedure - sent for cytology.   Green catheter in place with yellow to clear output.   OOB as tolerated with assistance and advance to regular diet  Nephrology following.     ABLA (acute blood loss anemia)  Most likely secondary to gross hematuria and postoperative anemia  Hgb dropped to 6.5 postoperatively - now stable at 10.4  Has been transfused 3 units of PRBC since admission  Continue to monitor H&H    Lab Results   Component Value Date/Time    HGB 10.4 (L) 05/16/2025 04:36 AM    HGB 10.5 (L) 05/15/2025 12:40 PM    HGB 10.4 (L) 05/15/2025 07:42 AM      GUIDO (acute kidney injury) (HCC)  Improved with fluids  Monitor BMP for improvement  Closely monitor volume status closely given hx of CHF  Lab Results   Component Value Date/Time    BUN 19 05/16/2025 04:36 AM    BUN 29 (H) 05/15/2025 06:18 AM    BUN 24 04/28/2025 11:27 AM    BUN 17 03/18/2024 11:29 AM    CREATININE 0.86 05/16/2025 04:36 AM    CREATININE 1.10 05/15/2025 06:18 AM    CREATININE 0.94 04/28/2025 11:27 AM    CREATININE 0.79 03/18/2024 11:29 AM      Chronic combined systolic and diastolic HF (HCC)  Follows with [facility] cardiology  Seen by cardiology at [facility] prior to admission.  "Empagaflozin remains on hold.   Plan to decrease Entresto and Carvedilol due to hypotension  Body fluid creatinine results pending, if fluid returns WNL possible workup of ascites     CAD (coronary artery disease)   History of stenting  Follows with [facility] cardiology  Continue with atorvastatin daily.  Aspirin on hold due to anemia     Type 2 Diabetes mellitus (HCC)  Prior to admission managed with empagliflozin, glyburide, metformin, and semaglutide. Medications held on admission.  Restart glyburide.   Continue to manage with sliding scale insulin.   Lab Results   Component Value Date/Time    POCGLU 161 (H) 05/16/2025 07:11 AM    POCGLU 180 (H) 05/15/2025 09:02 PM    POCGLU 221 (H) 05/15/2025 04:38 PM    POCGLU 265 (H) 05/15/2025 11:33 AM      Hyponatremia   Resolved  Lab Results   Component Value Date/Time    SODIUM 136 05/16/2025 04:36 AM    SODIUM 136 05/15/2025 06:18 AM    SODIUM 138 04/28/2025 11:27 AM    SODIUM 140 03/18/2024 11:29 AM          Subjective:   Patient seated in bed in no acute distress. POD #2 s/l left nephroureterectomy. Feels well. Has still been unable to pass a BM despite Colace and Miralax. Has some abdominal discomfort around surgical sites, mainly the midline incision. Has pain on deep inspiration. ALISSA drain contains serosanguinous fluid. Has mild pain at the ALISSA drain site. Denies chest pain at rest or SOB. Denies nausea or vomiting and is tolerating liquids. Denies back pain or weakness of the extremities. Denies subjective fever, chills, headache, fatigue, dizziness, or lightheadedness.     Objective:     Vitals: Blood pressure 133/83, pulse 81, temperature 98.2 °F (36.8 °C), temperature source Oral, resp. rate 19, height 5' 7\" (1.702 m), SpO2 95%.,Body mass index is 29.73 kg/m².      Intake/Output Summary (Last 24 hours) at 5/16/2025 1001  Last data filed at 5/16/2025 0905  Gross per 24 hour   Intake --   Output 3020 ml   Net -3020 ml       Physical Exam: Physical " Exam  Constitutional:       General: He is not in acute distress.     Appearance: Normal appearance. He is not ill-appearing or diaphoretic.   HENT:      Head: Normocephalic and atraumatic.     Eyes:      Extraocular Movements: Extraocular movements intact.      Pupils: Pupils are equal, round, and reactive to light.       Cardiovascular:      Rate and Rhythm: Normal rate and regular rhythm.      Heart sounds: Normal heart sounds. No murmur heard.  Pulmonary:      Effort: Pulmonary effort is normal. No respiratory distress.      Comments: Pain with deep inspiration.   Abdominal:      Palpations: Abdomen is soft.      Tenderness: There is abdominal tenderness. There is no guarding or rebound.      Comments: Visible ascites     Musculoskeletal:      Cervical back: Normal range of motion and neck supple.      Right lower leg: No edema.      Left lower leg: No edema.     Skin:     General: Skin is warm and dry.     Neurological:      General: No focal deficit present.      Mental Status: He is alert and oriented to person, place, and time.     Psychiatric:         Mood and Affect: Mood normal.         Behavior: Behavior normal.          Invasive Devices       Peripheral Intravenous Line  Duration             Peripheral IV 05/11/25 Dorsal (posterior);Right Forearm 5 days    Peripheral IV 05/14/25 Left Forearm 1 day    Peripheral IV 05/14/25 Left Wrist 1 day              Drain  Duration             Closed/Suction Drain LLQ Bulb 19 Fr. 1 day    Urethral Catheter 24 Fr. 1 day                      VTE Pharmacologic Prophylaxis: Reason for no pharmacologic prophylaxis anemia due to acute blood loss.  VTE Mechanical Prophylaxis: sequential compression device

## 2025-05-16 NOTE — PLAN OF CARE
Problem: PAIN - ADULT  Goal: Verbalizes/displays adequate comfort level or baseline comfort level  Description: Interventions:  - Encourage patient to monitor pain and request assistance  - Assess pain using appropriate pain scale  - Administer analgesics based on type and severity of pain and evaluate response  - Implement non-pharmacological measures as appropriate and evaluate response  - Consider cultural and social influences on pain and pain management  - Notify physician/advanced practitioner if interventions unsuccessful or patient reports new pain  Outcome: Progressing     Problem: INFECTION - ADULT  Goal: Absence or prevention of progression during hospitalization  Description: INTERVENTIONS:  - Assess and monitor for signs and symptoms of infection  - Monitor lab/diagnostic results  - Monitor all insertion sites, i.e. indwelling lines, tubes, and drains  - Monitor endotracheal if appropriate and nasal secretions for changes in amount and color  - [place] appropriate cooling/warming therapies per order  - Administer medications as ordered  - Instruct and encourage patient and family to use good hand hygiene technique  - Identify and instruct in appropriate isolation precautions for identified infection/condition  Outcome: Progressing     Problem: SAFETY ADULT  Goal: Patient will remain free of falls  Description: INTERVENTIONS:  - Educate patient/family on patient safety including physical limitations  - Instruct patient to call for assistance with activity   - Consult OT/PT to assist with strengthening/mobility   - Keep Call bell within reach  - Keep bed low and locked with side rails adjusted as appropriate  - Keep care items and personal belongings within reach  - Initiate and maintain comfort rounds  - Make Fall Risk Sign visible to staff  - Offer Toileting every 2 Hours, in advance of need  - Initiate/Maintain bed alarm  - Obtain necessary fall risk management equipment: call bell within reach  -  Apply yellow socks and bracelet for high fall risk patients  - Consider moving patient to room near nurses station  Outcome: Progressing  Goal: Maintain or return to baseline ADL function  Description: INTERVENTIONS:  -  Assess patient's ability to carry out ADLs; assess patient's baseline for ADL function and identify physical deficits which impact ability to perform ADLs (bathing, care of mouth/teeth, toileting, grooming, dressing, etc.)  - Assess/evaluate cause of self-care deficits   - Assess range of motion  - Assess patient's mobility; develop plan if impaired  - Assess patient's need for assistive devices and provide as appropriate  - Encourage maximum independence but intervene and supervise when necessary  - Involve family in performance of ADLs  - Assess for home care needs following discharge   - Consider OT consult to assist with ADL evaluation and planning for discharge  - Provide patient education as appropriate  Outcome: Progressing  Goal: Maintains/Returns to pre admission functional level  Description: INTERVENTIONS:  - Perform AM-PAC 6 Click Basic Mobility/ Daily Activity assessment daily.  - Set and communicate daily mobility goal to care team and patient/family/caregiver.   - Collaborate with rehabilitation services on mobility goals if consulted  - Perform Range of Motion 3 times a day.  - Reposition patient every 2 hours.  - Dangle patient 3 times a day  - Stand patient 3 times a day  - Ambulate patient 3 times a day  - Out of bed to chair 3 times a day   - Out of bed for meals 3 times a day  - Out of bed for toileting  - Record patient progress and toleration of activity level   Outcome: Progressing     Problem: DISCHARGE PLANNING  Goal: Discharge to home or other facility with appropriate resources  Description: INTERVENTIONS:  - Identify barriers to discharge w/patient and caregiver  - Arrange for needed discharge resources and transportation as appropriate  - Identify discharge learning  needs (meds, wound care, etc.)  - Arrange for interpretive services to assist at discharge as needed  - Refer to Case Management Department for coordinating discharge planning if the patient needs post-hospital services based on physician/advanced practitioner order or complex needs related to functional status, cognitive ability, or social support system  Outcome: Progressing     Problem: Knowledge Deficit  Goal: Patient/family/caregiver demonstrates understanding of disease process, treatment plan, medications, and discharge instructions  Description: Complete learning assessment and assess knowledge base.  Interventions:  - Provide teaching at level of understanding  - Provide teaching via preferred learning methods  Outcome: Progressing     Problem: Prexisting or High Potential for Compromised Skin Integrity  Goal: Skin integrity is maintained or improved  Description: INTERVENTIONS:  - Identify patients at risk for skin breakdown  - Assess and monitor skin integrity including under and around medical devices   - Assess and monitor nutrition and hydration status  - Monitor labs  - Assess for incontinence   - Turn and reposition patient  - Assist with mobility/ambulation  - Relieve pressure over akhil prominences   - Avoid friction and shearing  - Provide appropriate hygiene as needed including keeping skin clean and dry  - Evaluate need for skin moisturizer/barrier cream  - Collaborate with interdisciplinary team  - Patient/family teaching  - Consider wound care consult

## 2025-05-16 NOTE — ASSESSMENT & PLAN NOTE
Wt Readings from Last 3 Encounters:   05/10/25 86.1 kg (189 lb 13.1 oz)   05/09/25 86.1 kg (189 lb 13.1 oz)   07/13/24 84.1 kg (185 lb 6.5 oz)   No dyspnea, on room air  If creat body fluid returns WNL consider further workup for abdominal ascites

## 2025-05-16 NOTE — ASSESSMENT & PLAN NOTE
Wt Readings from Last 3 Encounters:   05/10/25 86.1 kg (189 lb 13.1 oz)   05/09/25 86.1 kg (189 lb 13.1 oz)   07/13/24 84.1 kg (185 lb 6.5 oz)     Follows with [facility] cardiology  Seen by [facility] cardiology at [facility]  GDMT: Decreased entresto and carvedilol dosing due to hypotension.  Restarting Jardiance

## 2025-05-16 NOTE — PLAN OF CARE
Problem: PAIN - ADULT  Goal: Verbalizes/displays adequate comfort level or baseline comfort level  Description: Interventions:  - Encourage patient to monitor pain and request assistance  - Assess pain using appropriate pain scale  - Administer analgesics based on type and severity of pain and evaluate response  - Implement non-pharmacological measures as appropriate and evaluate response  - Consider cultural and social influences on pain and pain management  - Notify physician/advanced practitioner if interventions unsuccessful or patient reports new pain  Outcome: Progressing     Problem: INFECTION - ADULT  Goal: Absence or prevention of progression during hospitalization  Description: INTERVENTIONS:  - Assess and monitor for signs and symptoms of infection  - Monitor lab/diagnostic results  - Monitor all insertion sites, i.e. indwelling lines, tubes, and drains  - Monitor endotracheal if appropriate and nasal secretions for changes in amount and color  - [place] appropriate cooling/warming therapies per order  - Administer medications as ordered  - Instruct and encourage patient and family to use good hand hygiene technique  - Identify and instruct in appropriate isolation precautions for identified infection/condition  Outcome: Progressing     Problem: SAFETY ADULT  Goal: Patient will remain free of falls  Description: INTERVENTIONS:  - Educate patient/family on patient safety including physical limitations  - Instruct patient to call for assistance with activity   - Consult OT/PT to assist with strengthening/mobility   - Keep Call bell within reach  - Keep bed low and locked with side rails adjusted as appropriate  - Keep care items and personal belongings within reach  - Initiate and maintain comfort rounds  - Make Fall Risk Sign visible to staff  - Apply yellow socks and bracelet for high fall risk patients  - Consider moving patient to room near nurses station  Outcome: Progressing  Goal: Maintain or  return to baseline ADL function  Description: INTERVENTIONS:  -  Assess patient's ability to carry out ADLs; assess patient's baseline for ADL function and identify physical deficits which impact ability to perform ADLs (bathing, care of mouth/teeth, toileting, grooming, dressing, etc.)  - Assess/evaluate cause of self-care deficits   - Assess range of motion  - Assess patient's mobility; develop plan if impaired  - Assess patient's need for assistive devices and provide as appropriate  - Encourage maximum independence but intervene and supervise when necessary  - Involve family in performance of ADLs  - Assess for home care needs following discharge   - Consider OT consult to assist with ADL evaluation and planning for discharge  - Provide patient education as appropriate  Outcome: Progressing  Goal: Maintains/Returns to pre admission functional level  Description: INTERVENTIONS:  - Perform AM-PAC 6 Click Basic Mobility/ Daily Activity assessment daily.  - Set and communicate daily mobility goal to care team and patient/family/caregiver.   - Collaborate with rehabilitation services on mobility goals if consulted  - Out of bed for meals 3 times a day  - Out of bed for toileting  - Record patient progress and toleration of activity level   Outcome: Progressing     Problem: DISCHARGE PLANNING  Goal: Discharge to home or other facility with appropriate resources  Description: INTERVENTIONS:  - Identify barriers to discharge w/patient and caregiver  - Arrange for needed discharge resources and transportation as appropriate  - Identify discharge learning needs (meds, wound care, etc.)  - Arrange for interpretive services to assist at discharge as needed  - Refer to Case Management Department for coordinating discharge planning if the patient needs post-hospital services based on physician/advanced practitioner order or complex needs related to functional status, cognitive ability, or social support system  Outcome:  Progressing     Problem: Knowledge Deficit  Goal: Patient/family/caregiver demonstrates understanding of disease process, treatment plan, medications, and discharge instructions  Description: Complete learning assessment and assess knowledge base.  Interventions:  - Provide teaching at level of understanding  - Provide teaching via preferred learning methods  Outcome: Progressing     Problem: Prexisting or High Potential for Compromised Skin Integrity  Goal: Skin integrity is maintained or improved  Description: INTERVENTIONS:  - Identify patients at risk for skin breakdown  - Assess and monitor skin integrity including under and around medical devices   - Assess and monitor nutrition and hydration status  - Monitor labs  - Assess for incontinence   - Turn and reposition patient  - Assist with mobility/ambulation  - Relieve pressure over akhil prominences   - Avoid friction and shearing  - Provide appropriate hygiene as needed including keeping skin clean and dry  - Evaluate need for skin moisturizer/barrier cream  - Collaborate with interdisciplinary team  - Patient/family teaching  - Consider wound care consult    Assess:  - Review Jackson scale daily  - Assess extremities for adequate circulation and sensation     Bed Management:  - Have minimal linens on bed & keep smooth, unwrinkled  - Change linens as needed when moist or perspiring  - Avoid sitting or lying in one position for more than 2 hours while in bed?Keep HOB at 60 degrees   - Toileting:  - Offer bedside commode      Activity:  - Encourage activity and walks on unit  - Encourage or provide ROM exercises     Skin Care:  - Avoid use of baby powder, tape, friction and shearing, hot water or constrictive clothing  - Do not massage red bony areas    Outcome: Progressing

## 2025-05-16 NOTE — PROGRESS NOTES
"Progress Note - Urology   Name: Jw Ray 59 y.o. 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   Staging CT chest abdomen pelvis with no evidence of metastatic disease.  Required 2 blood transfusions in the last 48 hrs prior to surgical intervention   S/p left nephro ureterectomy 5/14 by Dr Santacruz  Moderate volume ascites -- sent for cytology   -------------  ALISSA output 525 cc overnight --> light pink/yellow serosanguineous in nature  Green catheter with 1000 cc output overnight; clear yellow urine  Mild pain at surgical sites, overall well-controlled  Hgb remains stable at 10.4     /83 (BP Location: Right arm)   Pulse 81   Temp 98.2 °F (36.8 °C) (Oral)   Resp 19   Ht 5' 7\" (1.702 m)   SpO2 95%   BMI 29.73 kg/m²     Lab Results   Component Value Date    WBC 9.14 05/15/2025     Lab Results   Component Value Date    HGB 10.4 (L) 05/16/2025     Lab Results   Component Value Date    CREATININE 0.86 05/16/2025     Plan:  Regular diet   OOB as able with assistance   No need for further imaging at this time  Continue oral analgesia as needed for pain  Maintain Green catheter to straight drainage  Maintain ALISSA drain and document output and appearance -- ALISSA creat body fluid sent, results bending   Continue to monitor vitals, creatinine, Hgb, and WBC closely  Chronic combined systolic and diastolic heart failure (HCC)  Wt Readings from Last 3 Encounters:   05/10/25 86.1 kg (189 lb 13.1 oz) "   05/09/25 86.1 kg (189 lb 13.1 oz)   07/13/24 84.1 kg (185 lb 6.5 oz)   No dyspnea, on room air  If creat body fluid returns WNL consider further workup for abdominal ascites     Type 2 diabetes mellitus (HCC)  Lab Results   Component Value Date    HGBA1C 8 (H) 04/28/2025       Recent Labs     05/15/25  1638 05/15/25  2102 05/16/25  0711 05/16/25  1121   POCGLU 221* 180* 161* 200*       Blood Sugar Average: Last 72 hrs:  (P) 198.0665570871461658      Urology service will follow.    Subjective   Patient seen and examined at bedside, resting comfortably in no acute distress.  No issues reported per nursing overnight.  VSS, he remains afebrile.  Hgb remained stable at 10.4 this morning.  Green catheter remains inserted with 1000 cc output clear yellow urine overnight.  ALISSA drain with 525 cc output of serosanguineous pink/yellow fluid overnight.  ALISSA fluid sent out for further evaluation.  Abdominal ascites ongoing.  He continues to report pain located surrounding the ALISSA site but pain has improved overall.  Diet advanced to regular.  He plans on ambulating later this afternoon.    Objective :  Temp:  [97.2 °F (36.2 °C)-98.8 °F (37.1 °C)] 98.2 °F (36.8 °C)  HR:  [79-86] 81  BP: (106-133)/(62-83) 133/83  Resp:  [17-20] 19  SpO2:  [91 %-95 %] 95 %  O2 Device: Nasal cannula  Nasal Cannula O2 Flow Rate (L/min):  [1 L/min-2 L/min] 2 L/min    I/O         05/14 0701  05/15 0700 05/15 0701  05/16 0700 05/16 0701 05/17 0700    P.O.  450     I.V. 3000      Blood 700      IV Piggyback 500      Total Intake 4200 450     Urine 4570 1490 650    Drains 45 1030 230    Blood 300      Total Output 4915 2520 880    Net -715 -2070 -880                 Lines/Drains/Airways       Active Status       Name Placement date Placement time Site Days    Closed/Suction Drain LLQ Bulb 19 Fr. 05/14/25  1815  LLQ  1    Urethral Catheter 24 Fr. 05/14/25  1432  --  1                  Physical Exam  Vitals and nursing note reviewed.   Constitutional:        General: He is not in acute distress.     Appearance: Normal appearance. He is well-developed. He is not ill-appearing.   HENT:      Head: Normocephalic and atraumatic.     Eyes:      Conjunctiva/sclera: Conjunctivae normal.       Cardiovascular:      Rate and Rhythm: Normal rate and regular rhythm.      Heart sounds: No murmur heard.  Pulmonary:      Effort: Pulmonary effort is normal. No respiratory distress.      Breath sounds: Normal breath sounds.   Abdominal:      General: Abdomen is flat. There is no distension.      Palpations: Abdomen is soft.      Tenderness: There is abdominal tenderness (surrounding ALISSA insertion site). There is no right CVA tenderness or left CVA tenderness.      Comments: Abdomen soft and nondistended.  Mild ascites ongoing.  Surgical sites are clean and dry, well-approximated.  No erythema or discharge noted.  Left lower quadrant ALISSA drain with light pink/yellow serosanguineous fluid output.   Genitourinary:     Penis: Normal.       Comments: Green remains inserted draining clear yellow urine    Musculoskeletal:         General: No swelling.      Cervical back: Neck supple.     Skin:     General: Skin is warm and dry.      Capillary Refill: Capillary refill takes less than 2 seconds.     Neurological:      Mental Status: He is alert.     Psychiatric:         Mood and Affect: Mood normal.         Lab Results: I have reviewed the following results:  Recent Labs     05/14/25  0038 05/14/25  1002 05/14/25  1723 05/14/25  1936 05/15/25  0742 05/15/25  1240 05/16/25  0436   WBC  --   --   --   --  9.14  --   --    HGB  --    < > 8.8*   < > 10.4*   < > 10.4*   HCT  --    < > 26*   < > 31.5*   < > 30.9*   PLT  --   --   --   --  189  --   --    SODIUM 131*   < >  --    < >  --   --  136   K 4.3   < >  --    < >  --   --  4.0   CL 98   < >  --    < >  --   --  105   CO2 23   < > 21   < >  --   --  25   BUN 28*   < >  --    < >  --   --  19   CREATININE 1.85*   < >  --    < >  --   --  0.86    GLUC 285*   < >  --    < >  --   --  163*   CAIONIZED  --    < > 1.08*  --   --   --   --    AST 8*  --   --   --   --   --   --    ALT 5*  --   --   --   --   --   --    ALB 3.4*  --   --   --   --   --   --    TBILI 1.07*  --   --   --   --   --   --    ALKPHOS 30*  --   --   --   --   --   --     < > = values in this interval not displayed.       Imaging Results Review: No pertinent imaging studies reviewed.  Other Study Results Review: No additional pertinent studies reviewed.    VTE Pharmacologic Prophylaxis: VTE covered by:    None     VTE Mechanical Prophylaxis: sequential compression device

## 2025-05-16 NOTE — ASSESSMENT & PLAN NOTE
History of HFrEF diabetes mellitus CAD who presented to [facility] for hematuria transferred to [place] underwent left uteroscopy with stent placement and biopsy concerning for urothelial cancer  CT imaging without evidence of metastatic disease  Patient declined chemotherapy but is agreeable for surgical removal  Transferred here underwent left nephroureterectomy 5/14/2025  Disposition: Diet being advanced

## 2025-05-16 NOTE — ASSESSMENT & PLAN NOTE
Resolved    Results from last 7 days   Lab Units 05/16/25  0436 05/15/25  0618 05/14/25  1936 05/14/25  1002   SODIUM mmol/L 136 136 135 137

## 2025-05-16 NOTE — ASSESSMENT & PLAN NOTE
Secondary to gross hematuria and postoperative anemia  Has been transfused a total of 3 units PRBC since admission  Hemoglobin now stable    Results from last 7 days   Lab Units 05/16/25  0436 05/15/25  1240 05/15/25  0742 05/14/25  9159   HEMOGLOBIN g/dL 10.4* 10.5* 10.4* 6.5*

## 2025-05-16 NOTE — PROGRESS NOTES
Progress Note - Hospitalist   Name: Jw Ray 59 y.o. male I MRN: [id number]  Unit/Bed#: E5 -01 I Date of Admission: 5/13/2025   Date of Service: 5/16/2025 I Hospital Day: 3    Assessment & Plan  Urothelial cancer (HCC)  History of HFrEF diabetes mellitus CAD who presented to [facility] for hematuria transferred to [place] underwent left uteroscopy with stent placement and biopsy concerning for urothelial cancer  CT imaging without evidence of metastatic disease  Patient declined chemotherapy but is agreeable for surgical removal  Transferred here underwent left nephroureterectomy 5/14/2025  Disposition: Diet being advanced  ABLA (acute blood loss anemia)  Secondary to gross hematuria and postoperative anemia  Has been transfused a total of 3 units PRBC since admission  Hemoglobin now stable    Results from last 7 days   Lab Units 05/16/25  0436 05/15/25  1240 05/15/25  0742 05/14/25  2359   HEMOGLOBIN g/dL 10.4* 10.5* 10.4* 6.5*     Acute kidney injury (HCC)  Acute kidney injury improved with IV fluids  Volume status stable now off IV fluids    Results from last 7 days   Lab Units 05/16/25  0436 05/15/25  0618 05/14/25  1936 05/14/25  1002 05/14/25  0038 05/13/25  0809 05/12/25  0523 05/11/25  0512 05/10/25  0546   BUN mg/dL 19 29* 28* 25 28* 22 24 21 19   CREATININE mg/dL 0.86 1.10 1.19 1.22 1.85* 1.18 1.11 0.84 0.97   EGFR ml/min/1.73sq m 94 73 66 64 38 67 72 95 85     Chronic combined systolic and diastolic heart failure (HCC)  Wt Readings from Last 3 Encounters:   05/10/25 86.1 kg (189 lb 13.1 oz)   05/09/25 86.1 kg (189 lb 13.1 oz)   07/13/24 84.1 kg (185 lb 6.5 oz)     Follows with [facility] cardiology  Seen by  [facility]  GDMT: Decreased entresto and carvedilol dosing due to hypotension.  Restarting Jardiance  CAD (coronary artery disease)  CAD with history of stenting follows with [facility] cardiology  Aspirin on hold due to hematuria  Type 2 diabetes  mellitus (HCC)  Lab Results   Component Value Date    HGBA1C 8 (H) 2025     Recent Labs     05/15/25  2102 25  0711 25  1121 25  1552   POCGLU 180* 161* 200* 194*     Prior to admission on glyburide metformin empagliflozin and semaglutide  Currently on insulin sliding scale.    Restart glyburide and jardiance  Hyponatremia  Resolved    Results from last 7 days   Lab Units 25  0436 05/15/25  0618 25  1936 25  1002   SODIUM mmol/L 136 136 135 137       VTE Pharmacologic Prophylaxis:   Moderate Risk (Score 3-4) - Pharmacological DVT Prophylaxis Ordered: enoxaparin (Lovenox).    Mobility:   Basic Mobility Inpatient Raw Score: 24  JH-HLM Goal: 8: Walk 250 feet or more  JH-HLM Achieved: 2: Bed activities/Dependent transfer  JH-HLM Goal NOT achieved. Continue with multidisciplinary rounding and encourage appropriate mobility to improve upon JH-HLM goals.    Patient Centered Rounds: I have performed bedside rounds with nursing staff today.  Discussions with Specialists or Other Care Team Provider: Case management and urology    Education and Discussions with Family / Patient:     Current Length of Stay: 3 day(s)  Current Patient Status: Inpatient   Certification Statement: The patient will continue to require additional inpatient hospital stay due to awaiting return of bowel function  Discharge Plan: Anticipate discharge in 24-48 hrs to home.    Code Status: Level 1 - Full Code    Subjective   Patient seen and examined.  Feeling much better today.  No bowel movement    Objective   Vitals:   Temp (24hrs), Av.5 °F (36.9 °C), Min:98.2 °F (36.8 °C), Max:98.8 °F (37.1 °C)    Temp:  [98.2 °F (36.8 °C)-98.8 °F (37.1 °C)] 98.3 °F (36.8 °C)  HR:  [79-93] 93  Resp:  [17-19] 19  BP: (107-133)/(63-83) 114/72  SpO2:  [91 %-95 %] 92 %  Body mass index is 29.73 kg/m².     Input and Output Summary (last 24 hours):     Intake/Output Summary (Last 24 hours) at 2025 1557  Last data filed at  5/16/2025 1353  Gross per 24 hour   Intake 280 ml   Output 3500 ml   Net -3220 ml       Physical Exam  Vitals reviewed.   Constitutional:       General: He is not in acute distress.  HENT:      Head: Atraumatic.     Cardiovascular:      Rate and Rhythm: Regular rhythm.      Heart sounds: Normal heart sounds.   Pulmonary:      Effort: Pulmonary effort is normal.      Breath sounds: Decreased breath sounds present. No wheezing.   Abdominal:      General: Bowel sounds are normal.      Palpations: Abdomen is soft.      Tenderness: There is no abdominal tenderness.     Musculoskeletal:         General: No swelling or tenderness.     Skin:     General: Skin is warm and dry.     Neurological:      General: No focal deficit present.      Mental Status: He is alert and oriented to person, place, and time.      Cranial Nerves: No cranial nerve deficit.     Psychiatric:         Mood and Affect: Mood normal.       Lines/Drains:  Invasive Devices       Peripheral Intravenous Line  Duration             Peripheral IV 05/11/25 Dorsal (posterior);Right Forearm 5 days    Peripheral IV 05/14/25 Left Forearm 2 days    Peripheral IV 05/14/25 Left Wrist 2 days              Drain  Duration             Urethral Catheter 24 Fr. 2 days    Closed/Suction Drain LLQ Bulb 19 Fr. 1 day                  Urinary Catheter:  Goal for removal:                  Lab Results: I have reviewed the following results:   Results from last 7 days   Lab Units 05/16/25  0436 05/15/25  1240 05/15/25  0742 05/11/25  0756 05/11/25  0512 05/10/25  1605 05/10/25  0546   WBC Thousand/uL  --   --  9.14  --  11.08*  --  11.51*   HEMOGLOBIN g/dL 10.4* 10.5* 10.4*   < > 10.1*   < > 10.8*   I STAT HEMOGLOBIN   --   --   --    < >  --   --   --    PLATELETS Thousands/uL  --   --  189  --  160  --  166   MCV fL  --   --  91  --  90  --  89    < > = values in this interval not displayed.     Results from last 7 days   Lab Units 05/16/25  0436 05/15/25  0618 05/14/25  1936  05/14/25  1002 05/14/25  0038 05/11/25  0512 05/10/25  0546   SODIUM mmol/L 136 136 135   < > 131*   < > 137   POTASSIUM mmol/L 4.0 4.9 5.2   < > 4.3   < > 4.1   CHLORIDE mmol/L 105 106 106   < > 98   < > 105   CO2 mmol/L 25 21 21   < > 23   < > 24   CO2, I-STAT   --   --   --    < >  --   --   --    ANION GAP mmol/L 6 9 8   < > 10   < > 8   BUN mg/dL 19 29* 28*   < > 28*   < > 19   CREATININE mg/dL 0.86 1.10 1.19   < > 1.85*   < > 0.97   CALCIUM mg/dL 7.5* 7.9* 7.8*   < > 8.0*   < > 8.2*   ALBUMIN g/dL  --   --   --   --  3.4*  --  3.7   TOTAL BILIRUBIN mg/dL  --   --   --   --  1.07*  --  0.52   ALK PHOS U/L  --   --   --   --  30*  --  34   ALT U/L  --   --   --   --  5*  --  8   AST U/L  --   --   --   --  8*  --  9*   EGFR ml/min/1.73sq m 94 73 66   < > 38   < > 85   GLUCOSE RANDOM mg/dL 163* 199* 226*   < > 285*   < > 140    < > = values in this interval not displayed.                          Results from last 7 days   Lab Units 05/16/25  1552 05/16/25  1121 05/16/25  0711 05/15/25  2102 05/15/25  1638 05/15/25  1133 05/15/25  0724 05/14/25  2027 05/14/25  1917 05/14/25  1111 05/14/25  0735 05/13/25  2146   POC GLUCOSE mg/dl 194* 200* 161* 180* 221* 265* 167* 216* 201* 150* 138 287*               Recent Cultures (last 7 days):         Imaging:  Reviewed radiology reports from this admission including:  No results found.    Last 24 Hours Medication List:     Current Facility-Administered Medications:     acetaminophen (TYLENOL) tablet 650 mg, Q6H PRN    atorvastatin (LIPITOR) tablet 40 mg, Daily With Dinner    bisacodyl (DULCOLAX) rectal suppository 10 mg, Daily PRN    carvedilol (COREG) tablet 3.125 mg, BID With Meals    docusate sodium (COLACE) capsule 100 mg, BID    Empagliflozin (JARDIANCE) tablet 10 mg, Daily    enoxaparin (LOVENOX) subcutaneous injection 40 mg, Q24H DEMARCO    finasteride (PROSCAR) tablet 5 mg, Daily    gabapentin (NEURONTIN) capsule 100 mg, Daily    glyBURIDE (DIABETA) tablet 2.5 mg, Daily  With Breakfast    HYDROmorphone (DILAUDID) injection 0.5 mg, Q4H PRN    insulin glargine (LANTUS) subcutaneous injection 5 Units 0.05 mL, HS    insulin lispro (HumALOG/ADMELOG) 100 units/mL subcutaneous injection 1-6 Units, TID AC **AND** Fingerstick Glucose (POCT), TID AC    insulin lispro (HumALOG/ADMELOG) 100 units/mL subcutaneous injection 1-6 Units, HS    nicotine (NICODERM CQ) 14 mg/24hr TD 24 hr patch 1 patch, Daily    ondansetron (ZOFRAN) injection 4 mg, Q6H PRN    oxybutynin (DITROPAN) tablet 5 mg, TID    oxyCODONE (ROXICODONE) IR tablet 5 mg, Q4H PRN    polyethylene glycol (MIRALAX) packet 17 g, Daily    sacubitril-valsartan (ENTRESTO) 24-26 MG per tablet 1 tablet, BID    Administrative Statements   Today, Patient Was Seen By: Diogenes Mcrae, DO  I have spent a total time of 35 minutes in caring for this patient on the day of the visit/encounter including Counseling / Coordination of care, Documenting in the medical record, Reviewing/placing orders in the medical record (including tests, medications, and/or procedures), and Communicating with other healthcare professionals .    **Please Note: This note may have been constructed using a voice recognition system.**

## 2025-05-16 NOTE — ASSESSMENT & PLAN NOTE
CAD with history of stenting follows with [facility] cardiology  Aspirin on hold due to hematuria

## 2025-05-16 NOTE — ASSESSMENT & PLAN NOTE
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   Staging CT chest abdomen pelvis with no evidence of metastatic disease.  Required 2 blood transfusions in the last 48 hrs prior to surgical intervention   S/p left nephro ureterectomy 5/14 by Dr Santacruz  Moderate volume ascites -- sent for cytology   -------------  ALISSA output 525 cc overnight --> light pink/yellow serosanguineous in nature  Green catheter with 1000 cc output overnight; clear yellow urine  Mild pain at surgical sites, overall well-controlled  Hgb remains stable at 10.4     /83 (BP Location: Right arm)   Pulse 81   Temp 98.2 °F (36.8 °C) (Oral)   Resp 19   Ht 5' 7\" (1.702 m)   SpO2 95%   BMI 29.73 kg/m²     Lab Results   Component Value Date    WBC 9.14 05/15/2025     Lab Results   Component Value Date    HGB 10.4 (L) 05/16/2025     Lab Results   Component Value Date    CREATININE 0.86 05/16/2025     Plan:  Regular diet   OOB as able with assistance   No need for further imaging at this time  Continue oral analgesia as needed for pain  Maintain Green catheter to straight drainage  Maintain ALISSA drain and document output and appearance -- ALISSA creat body fluid sent, results bending   Continue to monitor vitals, creatinine, Hgb, and WBC closely  "

## 2025-05-16 NOTE — ASSESSMENT & PLAN NOTE
Acute kidney injury improved with IV fluids  Volume status stable now off IV fluids    Results from last 7 days   Lab Units 05/16/25  0436 05/15/25  0618 05/14/25  1936 05/14/25  1002 05/14/25  0038 05/13/25  0809 05/12/25  0523 05/11/25  0512 05/10/25  0546   BUN mg/dL 19 29* 28* 25 28* 22 24 21 19   CREATININE mg/dL 0.86 1.10 1.19 1.22 1.85* 1.18 1.11 0.84 0.97   EGFR ml/min/1.73sq m 94 73 66 64 38 67 72 95 85

## 2025-05-16 NOTE — ASSESSMENT & PLAN NOTE
Lab Results   Component Value Date    HGBA1C 8 (H) 04/28/2025       Recent Labs     05/15/25  1638 05/15/25  2102 05/16/25  0711 05/16/25  1121   POCGLU 221* 180* 161* 200*       Blood Sugar Average: Last 72 hrs:  (P) [id number]

## 2025-05-16 NOTE — ASSESSMENT & PLAN NOTE
Lab Results   Component Value Date    HGBA1C 8 (H) 04/28/2025     Recent Labs     05/15/25  2102 05/16/25  0711 05/16/25  1121 05/16/25  1552   POCGLU 180* 161* 200* 194*     Prior to admission on glyburide metformin empagliflozin and semaglutide  Currently on insulin sliding scale.    Restart glyburide and jardiance

## 2025-05-17 LAB
ANION GAP SERPL CALCULATED.3IONS-SCNC: 9 MMOL/L (ref 4–13)
BUN SERPL-MCNC: 19 MG/DL (ref 5–25)
CALCIUM SERPL-MCNC: 8.2 MG/DL (ref 8.4–10.2)
CHLORIDE SERPL-SCNC: 102 MMOL/L (ref 96–108)
CO2 SERPL-SCNC: 25 MMOL/L (ref 21–32)
CREAT SERPL-MCNC: 0.92 MG/DL (ref 0.6–1.3)
ERYTHROCYTE [DISTWIDTH] IN BLOOD BY AUTOMATED COUNT: 13.4 % (ref 11.6–15.1)
GFR SERPL CREATININE-BSD FRML MDRD: 90 ML/MIN/1.73SQ M
GLUCOSE SERPL-MCNC: 110 MG/DL (ref 65–140)
GLUCOSE SERPL-MCNC: 117 MG/DL (ref 65–140)
GLUCOSE SERPL-MCNC: 129 MG/DL (ref 65–140)
GLUCOSE SERPL-MCNC: 162 MG/DL (ref 65–140)
GLUCOSE SERPL-MCNC: 176 MG/DL (ref 65–140)
HCT VFR BLD AUTO: 35.5 % (ref 36.5–49.3)
HGB BLD-MCNC: 12.3 G/DL (ref 12–17)
MCH RBC QN AUTO: 30.1 PG (ref 26.8–34.3)
MCHC RBC AUTO-ENTMCNC: 34.6 G/DL (ref 31.4–37.4)
MCV RBC AUTO: 87 FL (ref 82–98)
PLATELET # BLD AUTO: 244 THOUSANDS/UL (ref 149–390)
PMV BLD AUTO: 10 FL (ref 8.9–12.7)
POTASSIUM SERPL-SCNC: 3.7 MMOL/L (ref 3.5–5.3)
RBC # BLD AUTO: 4.08 MILLION/UL (ref 3.88–5.62)
SODIUM SERPL-SCNC: 136 MMOL/L (ref 135–147)
WBC # BLD AUTO: 6.43 THOUSAND/UL (ref 4.31–10.16)

## 2025-05-17 PROCEDURE — 99232 SBSQ HOSP IP/OBS MODERATE 35: CPT | Performed by: INTERNAL MEDICINE

## 2025-05-17 PROCEDURE — 85027 COMPLETE CBC AUTOMATED: CPT | Performed by: INTERNAL MEDICINE

## 2025-05-17 PROCEDURE — 80048 BASIC METABOLIC PNL TOTAL CA: CPT | Performed by: INTERNAL MEDICINE

## 2025-05-17 PROCEDURE — 82948 REAGENT STRIP/BLOOD GLUCOSE: CPT

## 2025-05-17 PROCEDURE — 99024 POSTOP FOLLOW-UP VISIT: CPT | Performed by: PHYSICIAN ASSISTANT

## 2025-05-17 RX ORDER — CARVEDILOL 3.12 MG/1
3.12 TABLET ORAL 2 TIMES DAILY WITH MEALS
Qty: 60 TABLET | Refills: 0 | Status: SHIPPED | OUTPATIENT
Start: 2025-05-18

## 2025-05-17 RX ORDER — DOCUSATE SODIUM 100 MG/1
100 CAPSULE, LIQUID FILLED ORAL 2 TIMES DAILY
Qty: 60 CAPSULE | Refills: 0 | Status: SHIPPED | OUTPATIENT
Start: 2025-05-18

## 2025-05-17 RX ADMIN — INSULIN LISPRO 1 UNITS: 100 INJECTION, SOLUTION INTRAVENOUS; SUBCUTANEOUS at 12:02

## 2025-05-17 RX ADMIN — FINASTERIDE 5 MG: 5 TABLET, FILM COATED ORAL at 08:26

## 2025-05-17 RX ADMIN — OXYBUTYNIN CHLORIDE 5 MG: 5 TABLET ORAL at 17:11

## 2025-05-17 RX ADMIN — GABAPENTIN 100 MG: 100 CAPSULE ORAL at 08:26

## 2025-05-17 RX ADMIN — ATORVASTATIN CALCIUM 40 MG: 40 TABLET, FILM COATED ORAL at 17:11

## 2025-05-17 RX ADMIN — OXYBUTYNIN CHLORIDE 5 MG: 5 TABLET ORAL at 08:25

## 2025-05-17 RX ADMIN — ENOXAPARIN SODIUM 40 MG: 40 INJECTION SUBCUTANEOUS at 08:26

## 2025-05-17 RX ADMIN — EMPAGLIFLOZIN 10 MG: 10 TABLET, FILM COATED ORAL at 08:26

## 2025-05-17 RX ADMIN — OXYBUTYNIN CHLORIDE 5 MG: 5 TABLET ORAL at 21:23

## 2025-05-17 RX ADMIN — NICOTINE 1 PATCH: 14 PATCH, EXTENDED RELEASE TRANSDERMAL at 08:26

## 2025-05-17 RX ADMIN — DOCUSATE SODIUM 100 MG: 100 CAPSULE, LIQUID FILLED ORAL at 08:26

## 2025-05-17 RX ADMIN — INSULIN LISPRO 1 UNITS: 100 INJECTION, SOLUTION INTRAVENOUS; SUBCUTANEOUS at 21:24

## 2025-05-17 RX ADMIN — SIMETHICONE 80 MG: 80 TABLET, CHEWABLE ORAL at 00:18

## 2025-05-17 RX ADMIN — DOCUSATE SODIUM 100 MG: 100 CAPSULE, LIQUID FILLED ORAL at 17:11

## 2025-05-17 RX ADMIN — GLYBURIDE 2.5 MG: 1.25 TABLET ORAL at 08:26

## 2025-05-17 NOTE — ASSESSMENT & PLAN NOTE
Secondary to gross hematuria and postoperative anemia  Has been transfused a total of 3 units PRBC since admission  Hemoglobin now stable    Results from last 7 days   Lab Units 05/17/25  0500 05/16/25  0436 05/15/25  1240 05/15/25  0742   HEMOGLOBIN g/dL 12.3 10.4* 10.5* 10.4*      venodyne boots

## 2025-05-17 NOTE — ASSESSMENT & PLAN NOTE
CAD with history of stenting follows with [facility] cardiology  Aspirin on hold due to hematuria

## 2025-05-17 NOTE — ASSESSMENT & PLAN NOTE
History of HFrEF diabetes mellitus CAD who presented to [facility] for hematuria transferred to [place] underwent left uteroscopy with stent placement and biopsy concerning for urothelial cancer  CT imaging without evidence of metastatic disease  Patient declined chemotherapy but is agreeable for surgical removal  Transferred here underwent left nephroureterectomy 5/14/2025  Disposition: Diet advanced.  Still having significant ALISSA drain output.  Discharge when okay with urology service

## 2025-05-17 NOTE — PROGRESS NOTES
"  UROLOGY PROGRESS NOTE   Patient Identifiers: Jw Ray (MRN [id number])  Date of Service: 5/17/2025    Subjective:   Awake and alert.  Feels much better today.  Passing flatus.  Urine is clear.  Afebrile.  Creatinine 0.92.  H&H 12.3 and 35.5.  ALISSA drain slowing down.  140 mL overnight.    Patient has  no complaints.      Objective:     VITALS:    Vitals:    05/17/25 0657   BP: 101/68   Pulse: 91   Resp: 16   Temp: 98.4 °F (36.9 °C)   SpO2: 95%           LABS:  Lab Results   Component Value Date    HGB 12.3 05/17/2025    HCT 35.5 (L) 05/17/2025    WBC 6.43 05/17/2025     05/17/2025   ]    Lab Results   Component Value Date    K 3.7 05/17/2025     05/17/2025    CO2 25 05/17/2025    BUN 19 05/17/2025    CREATININE 0.92 05/17/2025    CALCIUM 8.2 (L) 05/17/2025    GLUCOSE 213 (H) 05/14/2025   ]        INPATIENT MEDS:  Current Medications[1]      Physical Exam:   /68   Pulse 91   Temp 98.4 °F (36.9 °C)   Resp 16   Ht 5' 7\" (1.702 m)   SpO2 95%   BMI 29.73 kg/m²   GEN: no acute distress    RESP: breathing comfortably with no accessory muscle use    ABD: soft, appropriately tender to palpation, non-distended   INCISION: clean, dry, intact   EXT: no significant peripheral edema     LI: in place draining clear yellow urine  no clots Cath-Secure readjusted    RADIOLOGY:   None    Assessment:   #1.POD # 3 Left - NEPHRO-URETERECTOMY LAPAROSCOPIC W/ ROBOTIC. LYMPH NODE DISSECTION     Plan:   -ALISSA drain still draining quite a bit  - Seems to be tolerating a regular diet  -Out of bed and ambulate today  - Hopefully can get his drain out and discharge home tomorrow               [1]   Current Facility-Administered Medications:     acetaminophen (TYLENOL) tablet 650 mg, 650 mg, Oral, Q6H PRN, Marbin Santacruz MD, 650 mg at 05/15/25 1645    atorvastatin (LIPITOR) tablet 40 mg, 40 mg, Oral, Daily With Dinner, Marbin Santacruz MD, 40 mg at 05/16/25 1634    bisacodyl (DULCOLAX) rectal suppository 10 mg, " 10 mg, Rectal, Daily PRN, Marbin Santacruz MD, 10 mg at 05/16/25 2231    carvedilol (COREG) tablet 3.125 mg, 3.125 mg, Oral, BID With Meals, Diogenes Mcrae DO, 3.125 mg at 05/16/25 1634    docusate sodium (COLACE) capsule 100 mg, 100 mg, Oral, BID, Marbin Santacruz MD, 100 mg at 05/16/25 1812    Empagliflozin (JARDIANCE) tablet 10 mg, 10 mg, Oral, Daily, Diogenes Mcrae DO, 10 mg at 05/16/25 1351    enoxaparin (LOVENOX) subcutaneous injection 40 mg, 40 mg, Subcutaneous, Q24H DEMARCO, Diogenes Mcrae DO, 40 mg at 05/16/25 1214    finasteride (PROSCAR) tablet 5 mg, 5 mg, Oral, Daily, Marbin Santacruz MD, 5 mg at 05/16/25 0901    gabapentin (NEURONTIN) capsule 100 mg, 100 mg, Oral, Daily, Marbin Santacruz MD, 100 mg at 05/16/25 0901    glyBURIDE (DIABETA) tablet 2.5 mg, 2.5 mg, Oral, Daily With Breakfast, Diogenes Mcrae DO, 2.5 mg at 05/16/25 1351    HYDROmorphone (DILAUDID) injection 0.5 mg, 0.5 mg, Intravenous, Q4H PRN, Angelika Duenas, BOOGIENP, 0.5 mg at 05/16/25 1659    insulin lispro (HumALOG/ADMELOG) 100 units/mL subcutaneous injection 1-6 Units, 1-6 Units, Subcutaneous, TID AC, 2 Units at 05/16/25 1634 **AND** Fingerstick Glucose (POCT), , , TID AC, Marbin Santacruz MD    insulin lispro (HumALOG/ADMELOG) 100 units/mL subcutaneous injection 1-6 Units, 1-6 Units, Subcutaneous, HS, Marbin Santacruz MD, 2 Units at 05/16/25 2235    nicotine (NICODERM CQ) 14 mg/24hr TD 24 hr patch 1 patch, 1 patch, Transdermal, Daily, Marbin Santacruz MD, 1 patch at 05/16/25 0900    ondansetron (ZOFRAN) injection 4 mg, 4 mg, Intravenous, Q6H PRN, Marbin Santacruz MD, 4 mg at 05/14/25 1815    oxybutynin (DITROPAN) tablet 5 mg, 5 mg, Oral, TID, Marbin Santacruz MD, 5 mg at 05/16/25 2230    oxyCODONE (ROXICODONE) IR tablet 5 mg, 5 mg, Oral, Q4H PRN, Elie Coto PA-C, 5 mg at 05/16/25 1354    polyethylene glycol (MIRALAX) packet 17 g, 17 g, Oral, Daily, Marbin Santacruz MD, 17 g at 05/16/25 0901    sacubitril-valsartan (ENTRESTO) 24-26 MG per tablet 1  tablet, 1 tablet, Oral, BID, Diogenes Mcrae DO, 1 tablet at 05/16/25 1812    simethicone (MYLICON) chewable tablet 80 mg, 80 mg, Oral, Q6H PRN, Elie Coto PA-C, 80 mg at 05/17/25 0018

## 2025-05-17 NOTE — ASSESSMENT & PLAN NOTE
Lab Results   Component Value Date    HGBA1C 8 (H) 04/28/2025     Recent Labs     05/16/25  2224 05/17/25  0658 05/17/25  1125 05/17/25  1608   POCGLU 223* 110 176* 129     Prior to admission on glyburide metformin empagliflozin and semaglutide  Currently on insulin sliding scale.    Restarted glyburide and jardiance

## 2025-05-17 NOTE — ASSESSMENT & PLAN NOTE
Wt Readings from Last 3 Encounters:   05/10/25 86.1 kg (189 lb 13.1 oz)   05/09/25 86.1 kg (189 lb 13.1 oz)   07/13/24 84.1 kg (185 lb 6.5 oz)     Follows with [facility] cardiology  Seen by [facility]ology at [facility] [place]  GDMT: Decreased entresto and carvedilol dosing due to hypotension.  Restarting Jardiance

## 2025-05-17 NOTE — PLAN OF CARE
Problem: PAIN - ADULT  Goal: Verbalizes/displays adequate comfort level or baseline comfort level  Description: Interventions:  - Encourage patient to monitor pain and request assistance  - Assess pain using appropriate pain scale  - Administer analgesics based on type and severity of pain and evaluate response  - Implement non-pharmacological measures as appropriate and evaluate response  - Consider cultural and social influences on pain and pain management  - Notify physician/advanced practitioner if interventions unsuccessful or patient reports new pain  5/17/2025 1138 by Olivia Olivares RN  Outcome: Progressing  5/17/2025 1136 by Olivia Olivares RN  Outcome: Progressing     Problem: INFECTION - ADULT  Goal: Absence or prevention of progression during hospitalization  Description: INTERVENTIONS:  - Assess and monitor for signs and symptoms of infection  - Monitor lab/diagnostic results  - Monitor all insertion sites, i.e. indwelling lines, tubes, and drains  - Monitor endotracheal if appropriate and nasal secretions for changes in amount and color  - [place] appropriate cooling/warming therapies per order  - Administer medications as ordered  - Instruct and encourage patient and family to use good hand hygiene technique  - Identify and instruct in appropriate isolation precautions for identified infection/condition  5/17/2025 1138 by Olivia Olivares RN  Outcome: Progressing  5/17/2025 1136 by Olivia Olivares RN  Outcome: Progressing     Problem: SAFETY ADULT  Goal: Patient will remain free of falls  Description: INTERVENTIONS:  - Educate patient/family on patient safety including physical limitations  - Instruct patient to call for assistance with activity   - Consult OT/PT to assist with strengthening/mobility   - Keep Call bell within reach  - Keep bed low and locked with side rails adjusted as appropriate  - Keep care items and personal belongings within reach  - Initiate and maintain comfort rounds  - Make Fall  Risk Sign visible to staff  - Apply yellow socks and bracelet for high fall risk patients  - Consider moving patient to room near nurses station  5/17/2025 1138 by Olivia Olivares RN  Outcome: Progressing  5/17/2025 1136 by Olivia Olivares RN  Outcome: Progressing  Goal: Maintain or return to baseline ADL function  Description: INTERVENTIONS:  -  Assess patient's ability to carry out ADLs; assess patient's baseline for ADL function and identify physical deficits which impact ability to perform ADLs (bathing, care of mouth/teeth, toileting, grooming, dressing, etc.)  - Assess/evaluate cause of self-care deficits   - Assess range of motion  - Assess patient's mobility; develop plan if impaired  - Assess patient's need for assistive devices and provide as appropriate  - Encourage maximum independence but intervene and supervise when necessary  - Involve family in performance of ADLs  - Assess for home care needs following discharge   - Consider OT consult to assist with ADL evaluation and planning for discharge  - Provide patient education as appropriate  5/17/2025 1138 by Olivia Olivares RN  Outcome: Progressing  5/17/2025 1136 by Olivia Olivares RN  Outcome: Progressing  Goal: Maintains/Returns to pre admission functional level  Description: INTERVENTIONS:  - Perform AM-PAC 6 Click Basic Mobility/ Daily Activity assessment daily.  - Set and communicate daily mobility goal to care team and patient/family/caregiver.   - Collaborate with rehabilitation services on mobility goals if consulted  - Out of bed for toileting  - Record patient progress and toleration of activity level   5/17/2025 1138 by Olivia Olivares RN  Outcome: Progressing  5/17/2025 1136 by Olivia Olivares RN  Outcome: Progressing     Problem: DISCHARGE PLANNING  Goal: Discharge to home or other facility with appropriate resources  Description: INTERVENTIONS:  - Identify barriers to discharge w/patient and caregiver  - Arrange for needed discharge resources and  transportation as appropriate  - Identify discharge learning needs (meds, wound care, etc.)  - Arrange for interpretive services to assist at discharge as needed  - Refer to Case Management Department for coordinating discharge planning if the patient needs post-hospital services based on physician/advanced practitioner order or complex needs related to functional status, cognitive ability, or social support system  5/17/2025 1138 by Olivia Olivares RN  Outcome: Progressing  5/17/2025 1136 by Olivia Olivares RN  Outcome: Progressing     Problem: Knowledge Deficit  Goal: Patient/family/caregiver demonstrates understanding of disease process, treatment plan, medications, and discharge instructions  Description: Complete learning assessment and assess knowledge base.  Interventions:  - Provide teaching at level of understanding  - Provide teaching via preferred learning methods  5/17/2025 1138 by Olivia Olivares RN  Outcome: Progressing  5/17/2025 1136 by Olivia Olivares RN  Outcome: Progressing     Problem: Prexisting or High Potential for Compromised Skin Integrity  Goal: Skin integrity is maintained or improved  Description: INTERVENTIONS:  - Identify patients at risk for skin breakdown  - Assess and monitor skin integrity including under and around medical devices   - Assess and monitor nutrition and hydration status  - Monitor labs  - Assess for incontinence   - Turn and reposition patient  - Assist with mobility/ambulation  - Relieve pressure over akhil prominences   - Avoid friction and shearing  - Provide appropriate hygiene as needed including keeping skin clean and dry  - Evaluate need for skin moisturizer/barrier cream  - Collaborate with interdisciplinary team  - Patient/family teaching  - Consider wound care consult    Assess:  - Review Jackson scale daily  - Clean and moisturize skin every shift  - Inspect skin when repositioning, toileting, and assisting with ADLS  - Assess extremities for adequate circulation  and sensation     Bed Management:  - Have minimal linens on bed & keep smooth, unwrinkled  - Change linens as needed when moist or perspiring    Activity:  - Encourage activity and walks on unit  - Encourage or provide ROM exercises   - Use appropriate equipment to lift or move patient in bed    Skin Care:  - Avoid use of baby powder, tape, friction and shearing, hot water or constrictive clothing  - Do not massage red bony area  5/17/2025 1138 by Olivia Olivares RN  Outcome: Progressing  5/17/2025 1136 by Olivia Olivares RN  Outcome: Progressing

## 2025-05-17 NOTE — ASSESSMENT & PLAN NOTE
Resolved    Results from last 7 days   Lab Units 05/17/25  0500 05/16/25  0436 05/15/25  0618 05/14/25  1936   SODIUM mmol/L 136 136 136 135

## 2025-05-17 NOTE — PLAN OF CARE
Problem: PAIN - ADULT  Goal: Verbalizes/displays adequate comfort level or baseline comfort level  Description: Interventions:  - Encourage patient to monitor pain and request assistance  - Assess pain using appropriate pain scale  - Administer analgesics based on type and severity of pain and evaluate response  - Implement non-pharmacological measures as appropriate and evaluate response  - Consider cultural and social influences on pain and pain management  - Notify physician/advanced practitioner if interventions unsuccessful or patient reports new pain  Outcome: Progressing     Problem: INFECTION - ADULT  Goal: Absence or prevention of progression during hospitalization  Description: INTERVENTIONS:  - Assess and monitor for signs and symptoms of infection  - Monitor lab/diagnostic results  - Monitor all insertion sites, i.e. indwelling lines, tubes, and drains  - Monitor endotracheal if appropriate and nasal secretions for changes in amount and color  - [place] appropriate cooling/warming therapies per order  - Administer medications as ordered  - Instruct and encourage patient and family to use good hand hygiene technique  - Identify and instruct in appropriate isolation precautions for identified infection/condition  Outcome: Progressing     Problem: SAFETY ADULT  Goal: Patient will remain free of falls  Description: INTERVENTIONS:  - Educate patient/family on patient safety including physical limitations  - Instruct patient to call for assistance with activity   - Consult OT/PT to assist with strengthening/mobility   - Keep Call bell within reach  - Keep bed low and locked with side rails adjusted as appropriate  - Keep care items and personal belongings within reach  - Initiate and maintain comfort rounds  - Make Fall Risk Sign visible to staff  - Offer Toileting every 2 Hours, in advance of need  - Initiate/Maintain 24/7 alarm  - Obtain necessary fall risk management equipment: bed alarm  - Apply yellow  socks and bracelet for high fall risk patients  - Consider moving patient to room near nurses station  Outcome: Progressing

## 2025-05-17 NOTE — ASSESSMENT & PLAN NOTE
Acute kidney injury improved with IV fluids  Volume status stable now off IV fluids    Results from last 7 days   Lab Units 05/17/25  0500 05/16/25  0436 05/15/25  0618 05/14/25  1936 05/14/25  1002 05/14/25  0038 05/13/25  0809 05/12/25  0523 05/11/25  0512   BUN mg/dL 19 19 29* 28* 25 28* 22 24 21   CREATININE mg/dL 0.92 0.86 1.10 1.19 1.22 1.85* 1.18 1.11 0.84   EGFR ml/min/1.73sq m 90 94 73 66 64 38 67 72 95

## 2025-05-17 NOTE — PROGRESS NOTES
Progress Note - Hospitalist   Name: Jw Ray 59 y.o. male I MRN: [id number]  Unit/Bed#: E5 -01 I Date of Admission: 5/13/2025   Date of Service: 5/17/2025 I Hospital Day: 4    Assessment & Plan  Urothelial cancer (HCC)  History of HFrEF diabetes mellitus CAD who presented to [facility] for hematuria transferred to [place] underwent left uteroscopy with stent placement and biopsy concerning for urothelial cancer  CT imaging without evidence of metastatic disease  Patient declined chemotherapy but is agreeable for surgical removal  Transferred here underwent left nephroureterectomy 5/14/2025  Disposition: Diet advanced.  Still having significant ALISSA drain output.  Discharge when okay with urology service  ABLA (acute blood loss anemia)  Secondary to gross hematuria and postoperative anemia  Has been transfused a total of 3 units PRBC since admission  Hemoglobin now stable    Results from last 7 days   Lab Units 05/17/25  0500 05/16/25  0436 05/15/25  1240 05/15/25  0742   HEMOGLOBIN g/dL 12.3 10.4* 10.5* 10.4*     Type 2 diabetes mellitus (HCC)  Lab Results   Component Value Date    HGBA1C 8 (H) 04/28/2025     Recent Labs     05/16/25  2224 05/17/25  0658 05/17/25  1125 05/17/25  1608   POCGLU 223* 110 176* 129     Prior to admission on glyburide metformin empagliflozin and semaglutide  Currently on insulin sliding scale.    Restarted glyburide and jardiance  Acute kidney injury (HCC)  Acute kidney injury improved with IV fluids  Volume status stable now off IV fluids    Results from last 7 days   Lab Units 05/17/25  0500 05/16/25  0436 05/15/25  0618 05/14/25  1936 05/14/25  1002 05/14/25  0038 05/13/25  0809 05/12/25  0523 05/11/25  0512   BUN mg/dL 19 19 29* 28* 25 28* 22 24 21   CREATININE mg/dL 0.92 0.86 1.10 1.19 1.22 1.85* 1.18 1.11 0.84   EGFR ml/min/1.73sq m 90 94 73 66 64 38 67 72 95     Chronic combined systolic and diastolic heart failure (HCC)  Wt Readings from Last 3 Encounters:    05/10/25 86.1 kg (189 lb 13.1 oz)   25 86.1 kg (189 lb 13.1 oz)   24 84.1 kg (185 lb 6.5 oz)     Follows with [facility] cardiology  Seen by  [place] cardiology at [facility]  GDMT: Decreased entresto and carvedilol dosing due to hypotension.  Restarting Jardiance  CAD (coronary artery disease)  CAD with history of stenting follows with [facility] cardiology  Aspirin on hold due to hematuria  Hyponatremia  Resolved    Results from last 7 days   Lab Units 25  0500 25  0436 05/15/25  0618 25  1936   SODIUM mmol/L 136 136 136 135       VTE Pharmacologic Prophylaxis:   Moderate Risk (Score 3-4) - Pharmacological DVT Prophylaxis Ordered: enoxaparin (Lovenox).    Mobility:   Basic Mobility Inpatient Raw Score: 24  JH-HLM Goal: 8: Walk 250 feet or more  JH-HLM Achieved: 8: Walk 250 feet ot more  JH-HLM Goal achieved. Continue to encourage appropriate mobility.    Patient Centered Rounds: I have performed bedside rounds with nursing staff today.  Discussions with Specialists or Other Care Team Provider: Case management    Education and Discussions with Family / Patient:     Current Length of Stay: 4 day(s)  Current Patient Status: Inpatient   Certification Statement: The patient will continue to require additional inpatient hospital stay due to urologic clearance  Discharge Plan: Anticipate discharge in 24-48 hrs to home.    Code Status: Level 1 - Full Code    Subjective   Patient seen and examined.  Feeling good.  Walking halls.  Good bowel movements.    Objective   Vitals:   Temp (24hrs), Av.8 °F (37.1 °C), Min:98.4 °F (36.9 °C), Max:99.6 °F (37.6 °C)    Temp:  [98.4 °F (36.9 °C)-99.6 °F (37.6 °C)] 98.4 °F (36.9 °C)  HR:  [] 83  Resp:  [16-18] 18  BP: (101-105)/(68-79) 105/68  SpO2:  [91 %-95 %] 95 %  Body mass index is 29.73 kg/m².     Input and Output Summary (last 24 hours):     Intake/Output Summary (Last 24 hours) at 2025 1703  Last data filed at 2025  1135  Gross per 24 hour   Intake --   Output 2930 ml   Net -2930 ml       Physical Exam  Vitals reviewed.   Constitutional:       General: He is not in acute distress.  HENT:      Head: Atraumatic.     Eyes:      General: No scleral icterus.     Extraocular Movements: Extraocular movements intact.       Cardiovascular:      Rate and Rhythm: Regular rhythm.      Heart sounds:      No gallop.   Pulmonary:      Effort: Pulmonary effort is normal. No respiratory distress.   Abdominal:      General: Bowel sounds are normal.      Palpations: Abdomen is soft.      Tenderness: There is no abdominal tenderness.     Musculoskeletal:         General: No tenderness or deformity.     Skin:     General: Skin is warm.     Neurological:      General: No focal deficit present.      Mental Status: He is alert.      Motor: No weakness.     Psychiatric:         Mood and Affect: Mood normal.       Lines/Drains:  Invasive Devices       Peripheral Intravenous Line  Duration             Peripheral IV 05/14/25 Left Forearm 3 days    Peripheral IV 05/14/25 Left Wrist 3 days              Drain  Duration             Urethral Catheter 24 Fr. 3 days    Closed/Suction Drain LLQ Bulb 19 Fr. 2 days                  Urinary Catheter:  Goal for removal:                  Lab Results: I have reviewed the following results:   Results from last 7 days   Lab Units 05/17/25  0500 05/16/25  0436 05/15/25  1240 05/15/25  0742 05/11/25  0756 05/11/25  0512   WBC Thousand/uL 6.43  --   --  9.14  --  11.08*   HEMOGLOBIN g/dL 12.3 10.4* 10.5* 10.4*   < > 10.1*   I STAT HEMOGLOBIN   --   --   --   --    < >  --    PLATELETS Thousands/uL 244  --   --  189  --  160   MCV fL 87  --   --  91  --  90    < > = values in this interval not displayed.     Results from last 7 days   Lab Units 05/17/25  0500 05/16/25  0436 05/15/25  0618 05/14/25  1002 05/14/25  0038   SODIUM mmol/L 136 136 136   < > 131*   POTASSIUM mmol/L 3.7 4.0 4.9   < > 4.3   CHLORIDE mmol/L 102 105  106   < > 98   CO2 mmol/L 25 25 21   < > 23   CO2, I-STAT   --   --   --    < >  --    ANION GAP mmol/L 9 6 9   < > 10   BUN mg/dL 19 19 29*   < > 28*   CREATININE mg/dL 0.92 0.86 1.10   < > 1.85*   CALCIUM mg/dL 8.2* 7.5* 7.9*   < > 8.0*   ALBUMIN g/dL  --   --   --   --  3.4*   TOTAL BILIRUBIN mg/dL  --   --   --   --  1.07*   ALK PHOS U/L  --   --   --   --  30*   ALT U/L  --   --   --   --  5*   AST U/L  --   --   --   --  8*   EGFR ml/min/1.73sq m 90 94 73   < > 38   GLUCOSE RANDOM mg/dL 117 163* 199*   < > 285*    < > = values in this interval not displayed.                          Results from last 7 days   Lab Units 05/17/25  1608 05/17/25  1125 05/17/25  0658 05/16/25  2224 05/16/25  1552 05/16/25  1121 05/16/25  0711 05/15/25  2102 05/15/25  1638 05/15/25  1133 05/15/25  0724 05/14/25 2027   POC GLUCOSE mg/dl 129 176* 110 223* 194* 200* 161* 180* 221* 265* 167* 216*               Recent Cultures (last 7 days):         Imaging:  Reviewed radiology reports from this admission including:  No results found.    Last 24 Hours Medication List:     Current Facility-Administered Medications:     acetaminophen (TYLENOL) tablet 650 mg, Q6H PRN    atorvastatin (LIPITOR) tablet 40 mg, Daily With Dinner    bisacodyl (DULCOLAX) rectal suppository 10 mg, Daily PRN    carvedilol (COREG) tablet 3.125 mg, BID With Meals    docusate sodium (COLACE) capsule 100 mg, BID    Empagliflozin (JARDIANCE) tablet 10 mg, Daily    enoxaparin (LOVENOX) subcutaneous injection 40 mg, Q24H DEMARCO    finasteride (PROSCAR) tablet 5 mg, Daily    gabapentin (NEURONTIN) capsule 100 mg, Daily    glyBURIDE (DIABETA) tablet 2.5 mg, Daily With Breakfast    HYDROmorphone (DILAUDID) injection 0.5 mg, Q4H PRN    insulin lispro (HumALOG/ADMELOG) 100 units/mL subcutaneous injection 1-6 Units, TID AC **AND** Fingerstick Glucose (POCT), TID AC    insulin lispro (HumALOG/ADMELOG) 100 units/mL subcutaneous injection 1-6 Units, HS    nicotine (NICODERM CQ) 14  mg/24hr TD 24 hr patch 1 patch, Daily    ondansetron (ZOFRAN) injection 4 mg, Q6H PRN    oxybutynin (DITROPAN) tablet 5 mg, TID    oxyCODONE (ROXICODONE) IR tablet 5 mg, Q4H PRN    polyethylene glycol (MIRALAX) packet 17 g, Daily    sacubitril-valsartan (ENTRESTO) 24-26 MG per tablet 1 tablet, BID    simethicone (MYLICON) chewable tablet 80 mg, Q6H PRN    Administrative Statements   Today, Patient Was Seen By: Diogenes Mcrae, DO  I have spent a total time of 35 minutes in caring for this patient on the day of the visit/encounter including Counseling / Coordination of care, Documenting in the medical record, and Communicating with other healthcare professionals .    **Please Note: This note may have been constructed using a voice recognition system.**

## 2025-05-18 VITALS
DIASTOLIC BLOOD PRESSURE: 75 MMHG | SYSTOLIC BLOOD PRESSURE: 111 MMHG | RESPIRATION RATE: 19 BRPM | OXYGEN SATURATION: 94 % | HEART RATE: 94 BPM | TEMPERATURE: 98.3 F | HEIGHT: 67 IN | BODY MASS INDEX: 29.73 KG/M2

## 2025-05-18 DIAGNOSIS — C68.9 UROTHELIAL CANCER (HCC): ICD-10-CM

## 2025-05-18 LAB
ANION GAP SERPL CALCULATED.3IONS-SCNC: 5 MMOL/L (ref 4–13)
BUN SERPL-MCNC: 14 MG/DL (ref 5–25)
CALCIUM SERPL-MCNC: 8.1 MG/DL (ref 8.4–10.2)
CHLORIDE SERPL-SCNC: 106 MMOL/L (ref 96–108)
CO2 SERPL-SCNC: 27 MMOL/L (ref 21–32)
CREAT SERPL-MCNC: 0.89 MG/DL (ref 0.6–1.3)
ERYTHROCYTE [DISTWIDTH] IN BLOOD BY AUTOMATED COUNT: 13.3 % (ref 11.6–15.1)
GFR SERPL CREATININE-BSD FRML MDRD: 93 ML/MIN/1.73SQ M
GLUCOSE SERPL-MCNC: 118 MG/DL (ref 65–140)
GLUCOSE SERPL-MCNC: 166 MG/DL (ref 65–140)
HCT VFR BLD AUTO: 34.4 % (ref 36.5–49.3)
HGB BLD-MCNC: 11.2 G/DL (ref 12–17)
MCH RBC QN AUTO: 29.3 PG (ref 26.8–34.3)
MCHC RBC AUTO-ENTMCNC: 32.6 G/DL (ref 31.4–37.4)
MCV RBC AUTO: 90 FL (ref 82–98)
PLATELET # BLD AUTO: 194 THOUSANDS/UL (ref 149–390)
PMV BLD AUTO: 9.6 FL (ref 8.9–12.7)
POTASSIUM SERPL-SCNC: 4 MMOL/L (ref 3.5–5.3)
RBC # BLD AUTO: 3.82 MILLION/UL (ref 3.88–5.62)
SODIUM SERPL-SCNC: 138 MMOL/L (ref 135–147)
WBC # BLD AUTO: 6.99 THOUSAND/UL (ref 4.31–10.16)

## 2025-05-18 PROCEDURE — 85027 COMPLETE CBC AUTOMATED: CPT | Performed by: PHYSICIAN ASSISTANT

## 2025-05-18 PROCEDURE — 99024 POSTOP FOLLOW-UP VISIT: CPT | Performed by: PHYSICIAN ASSISTANT

## 2025-05-18 PROCEDURE — 82948 REAGENT STRIP/BLOOD GLUCOSE: CPT

## 2025-05-18 PROCEDURE — 99239 HOSP IP/OBS DSCHRG MGMT >30: CPT | Performed by: INTERNAL MEDICINE

## 2025-05-18 PROCEDURE — 80048 BASIC METABOLIC PNL TOTAL CA: CPT | Performed by: PHYSICIAN ASSISTANT

## 2025-05-18 RX ORDER — OXYCODONE HYDROCHLORIDE 5 MG/1
5 TABLET ORAL EVERY 4 HOURS PRN
Qty: 6 TABLET | Refills: 0 | Status: SHIPPED | OUTPATIENT
Start: 2025-05-18 | End: 2025-05-18 | Stop reason: SDUPTHER

## 2025-05-18 RX ORDER — ACETAMINOPHEN 500 MG
500 TABLET ORAL EVERY 6 HOURS PRN
Qty: 30 TABLET | Refills: 0 | Status: SHIPPED | OUTPATIENT
Start: 2025-05-18 | End: 2025-05-18 | Stop reason: SDUPTHER

## 2025-05-18 RX ORDER — OXYCODONE HYDROCHLORIDE 5 MG/1
5 TABLET ORAL EVERY 4 HOURS PRN
Qty: 6 TABLET | Refills: 0 | Status: SHIPPED | OUTPATIENT
Start: 2025-05-18

## 2025-05-18 RX ORDER — HYDROMORPHONE HCL/PF 1 MG/ML
0.5 SYRINGE (ML) INJECTION EVERY 4 HOURS PRN
Status: DISCONTINUED | OUTPATIENT
Start: 2025-05-18 | End: 2025-05-18 | Stop reason: HOSPADM

## 2025-05-18 RX ORDER — ACETAMINOPHEN 500 MG
500 TABLET ORAL EVERY 6 HOURS PRN
Qty: 30 TABLET | Refills: 0 | Status: SHIPPED | OUTPATIENT
Start: 2025-05-18

## 2025-05-18 RX ADMIN — GLYBURIDE 2.5 MG: 1.25 TABLET ORAL at 07:48

## 2025-05-18 RX ADMIN — FINASTERIDE 5 MG: 5 TABLET, FILM COATED ORAL at 08:58

## 2025-05-18 RX ADMIN — DOCUSATE SODIUM 100 MG: 100 CAPSULE, LIQUID FILLED ORAL at 08:58

## 2025-05-18 RX ADMIN — EMPAGLIFLOZIN 10 MG: 10 TABLET, FILM COATED ORAL at 08:58

## 2025-05-18 RX ADMIN — OXYBUTYNIN CHLORIDE 5 MG: 5 TABLET ORAL at 08:58

## 2025-05-18 RX ADMIN — INSULIN LISPRO 1 UNITS: 100 INJECTION, SOLUTION INTRAVENOUS; SUBCUTANEOUS at 07:48

## 2025-05-18 RX ADMIN — CARVEDILOL 3.12 MG: 3.12 TABLET, FILM COATED ORAL at 07:48

## 2025-05-18 RX ADMIN — GABAPENTIN 100 MG: 100 CAPSULE ORAL at 08:58

## 2025-05-18 NOTE — PROGRESS NOTES
"  UROLOGY PROGRESS NOTE   Patient Identifiers: Jw Ray (MRN [id number])  Date of Service: 5/18/2025    Subjective:   Awake and alert.  Anxious for discharge.  Tolerating p.o.  Plus bowel movement.  Ambulates in the room.  Afebrile.  Creatinine 0.89.  H&H 11.2 and 34.4.  ALISSA drainage down to a minimal    Patient has  no complaints.      Objective:     VITALS:    Vitals:    05/18/25 0745   BP: 126/74   Pulse: 87   Resp:    Temp:    SpO2: 94%           LABS:  Lab Results   Component Value Date    HGB 11.2 (L) 05/18/2025    HCT 34.4 (L) 05/18/2025    WBC 6.99 05/18/2025     05/18/2025   ]    Lab Results   Component Value Date    K 4.0 05/18/2025     05/18/2025    CO2 27 05/18/2025    BUN 14 05/18/2025    CREATININE 0.89 05/18/2025    CALCIUM 8.1 (L) 05/18/2025    GLUCOSE 213 (H) 05/14/2025   ]        INPATIENT MEDS:  Current Medications[1]      Physical Exam:   /74   Pulse 87   Temp 98.3 °F (36.8 °C) (Oral)   Resp 19   Ht 5' 7\" (1.702 m)   SpO2 94%   BMI 29.73 kg/m²   GEN: no acute distress    RESP: breathing comfortably with no accessory muscle use    ABD: soft, appropriately tender to palpation, non-distended   INCISION: clean, dry, intact ALISSA drain removed and covered with a sterile gauze and Tegaderm  EXT: no significant peripheral edema     LI: in place draining clear yellow urine  no clots     RADIOLOGY:   None    Assessment:   #1.  POD# 4 Left - NEPHRO-URETERECTOMY LAPAROSCOPIC W/ ROBOTIC. LYMPH NODE DISSECTION         Plan:   -Discharge home today with Li catheter  - Follow-up appointment for catheter removal and pathology review  -  -               [1]   Current Facility-Administered Medications:     acetaminophen (TYLENOL) tablet 650 mg, 650 mg, Oral, Q6H PRN, Marbin Santacruz MD, 650 mg at 05/15/25 1645    atorvastatin (LIPITOR) tablet 40 mg, 40 mg, Oral, Daily With Dinner, Marbin Santacruz MD, 40 mg at 05/17/25 1711    bisacodyl (DULCOLAX) rectal suppository 10 mg, 10 mg, " Rectal, Daily PRN, Marbin Santacruz MD, 10 mg at 05/16/25 2231    carvedilol (COREG) tablet 3.125 mg, 3.125 mg, Oral, BID With Meals, Diogenes Mcrae DO, 3.125 mg at 05/18/25 0748    docusate sodium (COLACE) capsule 100 mg, 100 mg, Oral, BID, Marbin Santacruz MD, 100 mg at 05/17/25 1711    Empagliflozin (JARDIANCE) tablet 10 mg, 10 mg, Oral, Daily, Diogenes Mcrae DO, 10 mg at 05/17/25 0826    enoxaparin (LOVENOX) subcutaneous injection 40 mg, 40 mg, Subcutaneous, Q24H DEMARCO, Diogenes Mcrae DO, 40 mg at 05/17/25 0826    finasteride (PROSCAR) tablet 5 mg, 5 mg, Oral, Daily, Marbin Santacruz MD, 5 mg at 05/17/25 0826    gabapentin (NEURONTIN) capsule 100 mg, 100 mg, Oral, Daily, Marbin Santacruz MD, 100 mg at 05/17/25 0826    glyBURIDE (DIABETA) tablet 2.5 mg, 2.5 mg, Oral, Daily With Breakfast, Diogenes Mcrae DO, 2.5 mg at 05/18/25 0748    HYDROmorphone (DILAUDID) injection 0.5 mg, 0.5 mg, Intravenous, Q4H PRN, Angelika Duenas, BOOGIENP, 0.5 mg at 05/16/25 1659    insulin lispro (HumALOG/ADMELOG) 100 units/mL subcutaneous injection 1-6 Units, 1-6 Units, Subcutaneous, TID AC, 1 Units at 05/18/25 0748 **AND** Fingerstick Glucose (POCT), , , TID AC, Marbin Santacruz MD    insulin lispro (HumALOG/ADMELOG) 100 units/mL subcutaneous injection 1-6 Units, 1-6 Units, Subcutaneous, HS, Marbin Santacruz MD, 1 Units at 05/17/25 2124    nicotine (NICODERM CQ) 14 mg/24hr TD 24 hr patch 1 patch, 1 patch, Transdermal, Daily, Marbin Santacruz MD, 1 patch at 05/17/25 0826    ondansetron (ZOFRAN) injection 4 mg, 4 mg, Intravenous, Q6H PRN, Marbin Santacruz MD, 4 mg at 05/14/25 1815    oxybutynin (DITROPAN) tablet 5 mg, 5 mg, Oral, TID, Marbin Santacruz MD, 5 mg at 05/17/25 2123    oxyCODONE (ROXICODONE) IR tablet 5 mg, 5 mg, Oral, Q4H PRN, Elie Coto PA-C, 5 mg at 05/16/25 1354    polyethylene glycol (MIRALAX) packet 17 g, 17 g, Oral, Daily, Marbin Santacruz MD, 17 g at 05/16/25 0901    sacubitril-valsartan (ENTRESTO) 24-26 MG per tablet 1 tablet, 1  tablet, Oral, BID, Diogenes Mcrae DO, 1 tablet at 05/16/25 1812    simethicone (MYLICON) chewable tablet 80 mg, 80 mg, Oral, Q6H PRN, Elie Coto PA-C, 80 mg at 05/17/25 0018

## 2025-05-18 NOTE — PLAN OF CARE
Problem: PAIN - ADULT  Goal: Verbalizes/displays adequate comfort level or baseline comfort level  Description: Interventions:  - Encourage patient to monitor pain and request assistance  - Assess pain using appropriate pain scale  - Administer analgesics based on type and severity of pain and evaluate response  - Implement non-pharmacological measures as appropriate and evaluate response  - Consider cultural and social influences on pain and pain management  - Notify physician/advanced practitioner if interventions unsuccessful or patient reports new pain  Outcome: Progressing     Problem: INFECTION - ADULT  Goal: Absence or prevention of progression during hospitalization  Description: INTERVENTIONS:  - Assess and monitor for signs and symptoms of infection  - Monitor lab/diagnostic results  - Monitor all insertion sites, i.e. indwelling lines, tubes, and drains  - Monitor endotracheal if appropriate and nasal secretions for changes in amount and color  - [place] appropriate cooling/warming therapies per order  - Administer medications as ordered  - Instruct and encourage patient and family to use good hand hygiene technique  - Identify and instruct in appropriate isolation precautions for identified infection/condition  Outcome: Progressing     Problem: SAFETY ADULT  Goal: Patient will remain free of falls  Description: INTERVENTIONS:  - Educate patient/family on patient safety including physical limitations  - Instruct patient to call for assistance with activity   - Consult OT/PT to assist with strengthening/mobility   - Keep Call bell within reach  - Keep bed low and locked with side rails adjusted as appropriate  - Keep care items and personal belongings within reach  - Initiate and maintain comfort rounds  - Make Fall Risk Sign visible to staff  - Apply yellow socks and bracelet for high fall risk patients  - Consider moving patient to room near nurses station  Outcome: Progressing  Goal: Maintain or  return to baseline ADL function  Description: INTERVENTIONS:  -  Assess patient's ability to carry out ADLs; assess patient's baseline for ADL function and identify physical deficits which impact ability to perform ADLs (bathing, care of mouth/teeth, toileting, grooming, dressing, etc.)  - Assess/evaluate cause of self-care deficits   - Assess range of motion  - Assess patient's mobility; develop plan if impaired  - Assess patient's need for assistive devices and provide as appropriate  - Encourage maximum independence but intervene and supervise when necessary  - Involve family in performance of ADLs  - Assess for home care needs following discharge   - Consider OT consult to assist with ADL evaluation and planning for discharge  - Provide patient education as appropriate  Outcome: Progressing  Goal: Maintains/Returns to pre admission functional level  Description: INTERVENTIONS:  - Perform AM-PAC 6 Click Basic Mobility/ Daily Activity assessment daily.  - Set and communicate daily mobility goal to care team and patient/family/caregiver.   - Collaborate with rehabilitation services on mobility goals if consulted  - Out of bed for toileting  - Record patient progress and toleration of activity level   Outcome: Progressing     Problem: DISCHARGE PLANNING  Goal: Discharge to home or other facility with appropriate resources  Description: INTERVENTIONS:  - Identify barriers to discharge w/patient and caregiver  - Arrange for needed discharge resources and transportation as appropriate  - Identify discharge learning needs (meds, wound care, etc.)  - Arrange for interpretive services to assist at discharge as needed  - Refer to Case Management Department for coordinating discharge planning if the patient needs post-hospital services based on physician/advanced practitioner order or complex needs related to functional status, cognitive ability, or social support system  Outcome: Progressing     Problem: Knowledge  Deficit  Goal: Patient/family/caregiver demonstrates understanding of disease process, treatment plan, medications, and discharge instructions  Description: Complete learning assessment and assess knowledge base.  Interventions:  - Provide teaching at level of understanding  - Provide teaching via preferred learning methods  Outcome: Progressing     Problem: Prexisting or High Potential for Compromised Skin Integrity  Goal: Skin integrity is maintained or improved  Description: INTERVENTIONS:  - Identify patients at risk for skin breakdown  - Assess and monitor skin integrity including under and around medical devices   - Assess and monitor nutrition and hydration status  - Monitor labs  - Assess for incontinence   - Turn and reposition patient  - Assist with mobility/ambulation  - Relieve pressure over akhil prominences   - Avoid friction and shearing  - Provide appropriate hygiene as needed including keeping skin clean and dry  - Evaluate need for skin moisturizer/barrier cream  - Collaborate with interdisciplinary team  - Patient/family teaching  - Consider wound care consult    Assess:  - Review Jackson scale daily  - Clean and moisturize skin every shift  - Inspect skin when repositioning, toileting, and assisting with ADLS  - Assess extremities for adequate circulation and sensation     Bed Management:  - Have minimal linens on bed & keep smooth, unwrinkled  - Change linens as needed when moist or perspiring    Activity:  - Encourage activity and walks on unit  - Encourage or provide ROM exercises   - Use appropriate equipment to lift or move patient in bed    Skin Care:  - Avoid use of baby powder, tape, friction and shearing, hot water or constrictive clothing  - Do not massage red bony area  Outcome: Progressing

## 2025-05-18 NOTE — ASSESSMENT & PLAN NOTE
Wt Readings from Last 3 Encounters:   05/10/25 86.1 kg (189 lb 13.1 oz)   05/09/25 86.1 kg (189 lb 13.1 oz)   07/13/24 84.1 kg (185 lb 6.5 oz)     Follows with [facility] cardiology  Seen by [facility] cardiology at [facility]  GDMT: Decreased entresto and carvedilol dosing due to hypotension.  Restarting Jardiance  Prescription for new doses sent into his retail pharmacy.

## 2025-05-18 NOTE — ASSESSMENT & PLAN NOTE
Lab Results   Component Value Date    HGBA1C 8 (H) 04/28/2025     Recent Labs     05/17/25  1125 05/17/25  1608 05/17/25  2123 05/18/25  0705   POCGLU 176* 129 162* 166*     Prior to admission on glyburide metformin empagliflozin and semaglutide  Currently on insulin sliding scale.    Restarted glyburide and jardiance with good control  Continue home medications as previously taken

## 2025-05-18 NOTE — ASSESSMENT & PLAN NOTE
CAD with history of stenting follows with [facility] cardiology  Aspirin on hold due to hematuria.  Can restart post discharge

## 2025-05-18 NOTE — NURSING NOTE
Patient discharged home. Discharge instructions reviewed and questions answered. IV out. ALISSA drain removed by provider. Green leg and night bag provided. All personal belongings set with patient.

## 2025-05-18 NOTE — ASSESSMENT & PLAN NOTE
Secondary to gross hematuria and postoperative anemia  Has been transfused a total of 3 units PRBC since admission  Hemoglobin now stable    Results from last 7 days   Lab Units 05/18/25  0457 05/17/25  0500 05/16/25  0436 05/15/25  1240   HEMOGLOBIN g/dL 11.2* 12.3 10.4* 10.5*

## 2025-05-18 NOTE — ASSESSMENT & PLAN NOTE
Acute kidney injury improved with IV fluids  Volume status stable now off IV fluids    Results from last 7 days   Lab Units 05/18/25  0457 05/17/25  0500 05/16/25  0436 05/15/25  0618 05/14/25  1936 05/14/25  1002 05/14/25  0038 05/13/25  0809 05/12/25  0523   BUN mg/dL 14 19 19 29* 28* 25 28* 22 24   CREATININE mg/dL 0.89 0.92 0.86 1.10 1.19 1.22 1.85* 1.18 1.11   EGFR ml/min/1.73sq m 93 90 94 73 66 64 38 67 72

## 2025-05-18 NOTE — ASSESSMENT & PLAN NOTE
History of HFrEF diabetes mellitus CAD who presented to [facility] for hematuria transferred to [place] underwent left uteroscopy with stent placement and biopsy concerning for urothelial cancer  CT imaging without evidence of metastatic disease  Patient declined chemotherapy but is agreeable for surgical removal  Transferred here underwent left nephroureterectomy 5/14/2025  Disposition: Diet advanced.  Still having significant ALISSA drain output.  Neurology okay with discharge today.  Will need outpatient follow-up.

## 2025-05-18 NOTE — DISCHARGE SUMMARY
Discharge Summary - Hospitalist   Name: Jw Ray 59 y.o. male I MRN: [id number]  Unit/Bed#: E5 -01 I Date of Admission: 5/13/2025   Date of Service: 5/18/2025 I Hospital Day: 5    Assessment & Plan  Urothelial cancer (HCC)  History of HFrEF diabetes mellitus CAD who presented to [facility] for hematuria transferred to [place] underwent left uteroscopy with stent placement and biopsy concerning for urothelial cancer  CT imaging without evidence of metastatic disease  Patient declined chemotherapy but is agreeable for surgical removal  Transferred here underwent left nephroureterectomy 5/14/2025  Disposition: Diet advanced.  Still having significant ALISSA drain output.  Neurology okay with discharge today.  Will need outpatient follow-up.  ABLA (acute blood loss anemia)  Secondary to gross hematuria and postoperative anemia  Has been transfused a total of 3 units PRBC since admission  Hemoglobin now stable    Results from last 7 days   Lab Units 05/18/25  0457 05/17/25  0500 05/16/25  0436 05/15/25  1240   HEMOGLOBIN g/dL 11.2* 12.3 10.4* 10.5*     Type 2 diabetes mellitus (HCC)  Lab Results   Component Value Date    HGBA1C 8 (H) 04/28/2025     Recent Labs     05/17/25  1125 05/17/25  1608 05/17/25  2123 05/18/25  0705   POCGLU 176* 129 162* 166*     Prior to admission on glyburide metformin empagliflozin and semaglutide  Currently on insulin sliding scale.    Restarted glyburide and jardiance with good control  Continue home medications as previously taken  Acute kidney injury (HCC)  Acute kidney injury improved with IV fluids  Volume status stable now off IV fluids    Results from last 7 days   Lab Units 05/18/25  0457 05/17/25  0500 05/16/25  0436 05/15/25  0618 05/14/25  1936 05/14/25  1002 05/14/25  0038 05/13/25  0809 05/12/25  0523   BUN mg/dL 14 19 19 29* 28* 25 28* 22 24   CREATININE mg/dL 0.89 0.92 0.86 1.10 1.19 1.22 1.85* 1.18 1.11   EGFR ml/min/1.73sq m 93 90 94 73 66 64 38 67 72      Chronic combined systolic and diastolic heart failure (HCC)  Wt Readings from Last 3 Encounters:   05/10/25 86.1 kg (189 lb 13.1 oz)   05/09/25 86.1 kg (189 lb 13.1 oz)   07/13/24 84.1 kg (185 lb 6.5 oz)     Follows with [facility] cardiology  Seen by  [facility]ology at [facility]  GDMT: Decreased entresto and carvedilol dosing due to hypotension.  Restarting Jardiance  Prescription for new doses sent into his retail pharmacy.  CAD (coronary artery disease)  CAD with history of stenting follows with [facility] cardiology  Aspirin on hold due to hematuria.  Can restart post discharge  Hyponatremia  Resolved    Results from last 7 days   Lab Units 05/18/25  0457 05/17/25  0500 05/16/25  0436 05/15/25  0618   SODIUM mmol/L 138 136 136 136         Medical Problems       Resolved Problems  Date Reviewed: 5/17/2025   None        Discharging Physician / Practitioner: Diogenes Mcrae DO  PCP: Thanh Echeverria MD  Admission Date:   Admission Orders (From admission, onward)       Ordered        05/13/25 2039  INPATIENT ADMISSION  Once                        Discharge Date: 05/18/25    Consultations During Hospital Stay:  None     Procedures Performed:   Procedure(s) (LRB):  NEPHRO-URETERECTOMY LAPAROSCOPIC W/ ROBOTIC, LYMPH NODE DISSECTION (Left)     Images:   CT chest abdomen pelvis w contrast  Result Date: 5/12/2025  Impression: CT chest: No evidence of thoracic metastatic disease. Chronic findings and negatives as above. CT abdomen and pelvis: No evidence of abdominopelvic metastatic disease. Soft tissue in the left renal upper pole calyces attributed to recently biopsied urothelial malignancy. Interval placement of left nephroureteral stent. Left hydronephrosis similar to 5/9/2025. Additional chronic findings and negatives as above. Workstation performed: [id number]     CT renal stone study abdomen pelvis wo contrast  Result Date: 5/9/2025  Impression: 1.  Large dependent lobulated hyperdensity in the  urinary bladder, likely blood clot, although neoplasm is not excluded on this noncontrast study. 2.  Stable appearance of left hydronephrosis containing high density material, which also may represent blood products, although neoplasm again not excluded. Urology consultation is recommended. 3.  Tiny dependent hyperdense foci within the second portion of the duodenum and a left mid abdominal small bowel loop, which may represent ingested material. Correlate with oral intake. Workstation performed: [id number]         Lab Results: I have reviewed the following results:  Results from last 7 days   Lab Units 05/18/25 0457 05/17/25  0500 05/16/25  0436 05/15/25  1240 05/15/25  0742 05/14/25  2359 05/14/25  1936 05/14/25  1723 05/14/25  1606 05/14/25  1425   WBC Thousand/uL 6.99 6.43  --   --  9.14  --   --   --   --   --    HEMOGLOBIN g/dL 11.2* 12.3 10.4* 10.5* 10.4* 6.5* 8.9*  --  8.9*  --    I STAT HEMOGLOBIN g/dl  --   --   --   --   --   --   --  8.8*  --  6.5*   HEMATOCRIT % 34.4* 35.5* 30.9* 31.9* 31.5* 20.3* 26.1*  --  27.2*  --    HEMATOCRIT, ISTAT %  --   --   --   --   --   --   --  26*  --  19*   MCV fL 90 87  --   --  91  --   --   --   --   --    PLATELETS Thousands/uL 194 244  --   --  189  --   --   --   --   --      Results from last 7 days   Lab Units 05/18/25 0457 05/17/25  0500 05/16/25  0436 05/15/25  0618 05/14/25  1936 05/14/25  1723 05/14/25  1425 05/14/25  1002 05/14/25  0038 05/13/25  0809 05/12/25  0523   SODIUM mmol/L 138 136 136 136 135  --   --  137 131* 130* 135   POTASSIUM mmol/L 4.0 3.7 4.0 4.9 5.2  --   --  4.0 4.3 3.9 4.2   CHLORIDE mmol/L 106 102 105 106 106  --   --  104 98 100 103   CO2 mmol/L 27 25 25 21 21  --   --  26 23 22 25   CO2, I-STAT mmol/L  --   --   --   --   --  21 22  --   --   --   --    BUN mg/dL 14 19 19 29* 28*  --   --  25 28* 22 24   CREATININE mg/dL 0.89 0.92 0.86 1.10 1.19  --   --  1.22 1.85* 1.18 1.11   CALCIUM mg/dL 8.1* 8.2* 7.5* 7.9* 7.8*  --   --  7.8*  8.0* 8.0* 8.2*   ALBUMIN g/dL  --   --   --   --   --   --   --   --  3.4*  --   --    TOTAL BILIRUBIN mg/dL  --   --   --   --   --   --   --   --  1.07*  --   --    ALK PHOS U/L  --   --   --   --   --   --   --   --  30*  --   --    ALT U/L  --   --   --   --   --   --   --   --  5*  --   --    AST U/L  --   --   --   --   --   --   --   --  8*  --   --    EGFR ml/min/1.73sq m 93 90 94 73 66  --   --  64 38 67 72   GLUCOSE RANDOM mg/dL 118 117 163* 199* 226*  --   --  147* 285* 276* 172*                Results from last 7 days   Lab Units 05/18/25  0705 05/17/25  2123 05/17/25  1608 05/17/25  1125 05/17/25  0658 05/16/25  2224 05/16/25  1552 05/16/25  1121 05/16/25  0711 05/15/25  2102   POC GLUCOSE mg/dl 166* 162* 129 176* 110 223* 194* 200* 161* 180*       Incidental Findings:      Test Results Pending at Discharge (will require follow up):   Pending Labs       Order Current Status    Non-gynecologic cytology In process    Tissue Exam In process           Reason for Admission:   Need for nephrectomy    Hospital Course:   Jw Ray is a 59 y.o. male patient who originally presented to [facility] 5/9/2025 for hematuria.  He was transferred to [facility] 5/10/2025 underwent left uteroscopy with stent placement and biopsy concerning for urothelial cancer.  Due to scheduling difficulties the patient was transferred here to [facility] 5/13/2025 for left nephroureterectomy.  He did quite well postprocedure and diet has been advanced.  He did have hypotension so his Entresto and carvedilol was decreased.  His glucose was stable.  He is being discharged home in good condition as he has been cleared by urology for discharge    Please see above list of diagnoses and related plan for additional information.     Condition at Discharge: stable     Discharge Day Visit / Exam:   Subjective: Patient seen and examined.  Feeling well.  Good bowel movements.  Tolerating intake.    Vitals: Blood Pressure: 111/75  "(05/18/25 0855)  Pulse: 94 (05/18/25 0855)  Temperature: 98.3 °F (36.8 °C) (05/18/25 0704)  Temp Source: Oral (05/18/25 0704)  Respirations: 19 (05/18/25 0704)  Height: 5' 7\" (170.2 cm) (05/13/25 2041)  SpO2: 94 % (05/18/25 0855)    Exam:   Physical Exam  Vitals reviewed.   Constitutional:       General: He is not in acute distress.  HENT:      Head: Atraumatic.     Eyes:      Extraocular Movements: Extraocular movements intact.       Cardiovascular:      Rate and Rhythm: Regular rhythm.      Heart sounds: Normal heart sounds.   Pulmonary:      Effort: Pulmonary effort is normal.      Breath sounds: Decreased breath sounds present. No wheezing.   Abdominal:      General: Bowel sounds are normal.      Palpations: Abdomen is soft.      Tenderness: There is no abdominal tenderness.     Musculoskeletal:         General: No swelling.     Skin:     General: Skin is warm and dry.     Neurological:      General: No focal deficit present.      Mental Status: He is alert.      Motor: No weakness.     Psychiatric:         Mood and Affect: Mood normal.     Discharge instructions/Information to patient and family:   See after visit summary for information provided to patient and family.      Provisions for Follow-Up Care:  See after visit summary for information related to follow-up care and any pertinent home health orders.      Mobility at time of Discharge:  Basic Mobility Inpatient Raw Score: 24  JH-HLM Goal: 8: Walk 250 feet or more  JH-HLM Achieved: 8: Walk 250 feet ot more  JH-HLM Goal achieved. Continue to encourage appropriate mobility.    Disposition:   Home    Planned Readmission: No     Discharge Medications:  See after visit summary for reconciled discharge medications provided to patient and family.      Administrative Statements   I spent 35 minutes discharging the patient. This time was spent on the day of discharge. I had direct contact with the patient on the day of discharge. Greater than 50% of the total time " was spent examining patient, answering all patient questions, arranging and discussing plan of care with patient as well as directly providing post-discharge instructions.  Additional time then spent on discharge activities.    **Please Note: This note may have been constructed using a voice recognition system**

## 2025-05-18 NOTE — PLAN OF CARE
Problem: PAIN - ADULT  Goal: Verbalizes/displays adequate comfort level or baseline comfort level  Description: Interventions:  - Encourage patient to monitor pain and request assistance  - Assess pain using appropriate pain scale  - Administer analgesics based on type and severity of pain and evaluate response  - Implement non-pharmacological measures as appropriate and evaluate response  - Consider cultural and social influences on pain and pain management  - Notify physician/advanced practitioner if interventions unsuccessful or patient reports new pain  Outcome: Progressing     Problem: INFECTION - ADULT  Goal: Absence or prevention of progression during hospitalization  Description: INTERVENTIONS:  - Assess and monitor for signs and symptoms of infection  - Monitor lab/diagnostic results  - Monitor all insertion sites, i.e. indwelling lines, tubes, and drains  - Monitor endotracheal if appropriate and nasal secretions for changes in amount and color  - [place] appropriate cooling/warming therapies per order  - Administer medications as ordered  - Instruct and encourage patient and family to use good hand hygiene technique  - Identify and instruct in appropriate isolation precautions for identified infection/condition  Outcome: Progressing     Problem: SAFETY ADULT  Goal: Patient will remain free of falls  Description: INTERVENTIONS:  - Educate patient/family on patient safety including physical limitations  - Instruct patient to call for assistance with activity   - Consult OT/PT to assist with strengthening/mobility   - Keep Call bell within reach  - Keep bed low and locked with side rails adjusted as appropriate  - Keep care items and personal belongings within reach  - Initiate and maintain comfort rounds  - Make Fall Risk Sign visible to staff  - Apply yellow socks and bracelet for high fall risk patients  - Consider moving patient to room near nurses station  Outcome: Progressing  Goal: Maintain or  return to baseline ADL function  Description: INTERVENTIONS:  -  Assess patient's ability to carry out ADLs; assess patient's baseline for ADL function and identify physical deficits which impact ability to perform ADLs (bathing, care of mouth/teeth, toileting, grooming, dressing, etc.)  - Assess/evaluate cause of self-care deficits   - Assess range of motion  - Assess patient's mobility; develop plan if impaired  - Assess patient's need for assistive devices and provide as appropriate  - Encourage maximum independence but intervene and supervise when necessary  - Involve family in performance of ADLs  - Assess for home care needs following discharge   - Consider OT consult to assist with ADL evaluation and planning for discharge  - Provide patient education as appropriate  Outcome: Progressing  Goal: Maintains/Returns to pre admission functional level  Description: INTERVENTIONS:  - Perform AM-PAC 6 Click Basic Mobility/ Daily Activity assessment daily.  - Set and communicate daily mobility goal to care team and patient/family/caregiver.   - Collaborate with rehabilitation services on mobility goals if consulted  - Out of bed for toileting  - Record patient progress and toleration of activity level   Outcome: Progressing     Problem: DISCHARGE PLANNING  Goal: Discharge to home or other facility with appropriate resources  Description: INTERVENTIONS:  - Identify barriers to discharge w/patient and caregiver  - Arrange for needed discharge resources and transportation as appropriate  - Identify discharge learning needs (meds, wound care, etc.)  - Arrange for interpretive services to assist at discharge as needed  - Refer to Case Management Department for coordinating discharge planning if the patient needs post-hospital services based on physician/advanced practitioner order or complex needs related to functional status, cognitive ability, or social support system  Outcome: Progressing     Problem: Knowledge  Deficit  Goal: Patient/family/caregiver demonstrates understanding of disease process, treatment plan, medications, and discharge instructions  Description: Complete learning assessment and assess knowledge base.  Interventions:  - Provide teaching at level of understanding  - Provide teaching via preferred learning methods  Outcome: Progressing     Problem: Prexisting or High Potential for Compromised Skin Integrity  Goal: Skin integrity is maintained or improved  Description: INTERVENTIONS:  - Identify patients at risk for skin breakdown  - Assess and monitor skin integrity including under and around medical devices   - Assess and monitor nutrition and hydration status  - Monitor labs  - Assess for incontinence   - Turn and reposition patient  - Assist with mobility/ambulation  - Relieve pressure over akhil prominences   - Avoid friction and shearing  - Provide appropriate hygiene as needed including keeping skin clean and dry  - Evaluate need for skin moisturizer/barrier cream  - Collaborate with interdisciplinary team  - Patient/family teaching  - Consider wound care consult    Assess:  - Review Jackson scale daily  - Clean and moisturize skin every shift  - Inspect skin when repositioning, toileting, and assisting with ADLS  - Assess extremities for adequate circulation and sensation     Bed Management:  - Have minimal linens on bed & keep smooth, unwrinkled  - Change linens as needed when moist or perspiring    Activity:  - Encourage activity and walks on unit  - Encourage or provide ROM exercises   - Use appropriate equipment to lift or move patient in bed    Skin Care:  - Avoid use of baby powder, tape, friction and shearing, hot water or constrictive clothing  - Do not massage red bony area  Outcome: Progressing

## 2025-05-18 NOTE — ASSESSMENT & PLAN NOTE
Resolved    Results from last 7 days   Lab Units 05/18/25  0457 05/17/25  0500 05/16/25  0436 05/15/25  0618   SODIUM mmol/L 138 136 136 136

## 2025-05-19 ENCOUNTER — TELEPHONE (OUTPATIENT)
Dept: UROLOGY | Facility: CLINIC | Age: 60
End: 2025-05-19

## 2025-05-19 NOTE — TELEPHONE ENCOUNTER
----- Message from Flex Carter PA-C sent at 5/18/2025  8:28 AM EDT -----  Status post nephro ureterectomy.  Needs cath removal appointment and follow-up with Dr. Santacruz to review his pathology   Smoke:  etoh:  Drugs:  Work:  and  Smoke: Denies  etoh: sober since 1993  Drugs: Denies  Work:  and ; in graduate school

## 2025-05-19 NOTE — TELEPHONE ENCOUNTER
LM to contact office. If he calls back, please ask how he is feeling?Take medications as prescribed. Please ask pt to monitor for any ss of infection (fever, chills),  any increase blood in urine with clots or catheter not draining, please give office a call. No strenuous activities. Has he been able to have a BM? Please confirm all appointments and pt should call with any questions or concerns.

## 2025-05-19 NOTE — UTILIZATION REVIEW
NOTIFICATION OF ADMISSION DISCHARGE   This is a Notification of Discharge from [facility]. Please be advised that this patient has been discharge from our facility. Below you will find the admission and discharge date and time including the patient’s disposition.   UTILIZATION REVIEW CONTACT:  Utilization Review Assistants  Network Utilization Review Department  Phone: [phone] x carefully listen to the prompts. All voicemails are confidential.  Email: [email or web address]     ADMISSION INFORMATION  PRESENTATION DATE: 5/13/2025  8:23 PM  OBERVATION ADMISSION DATE: N/A  INPATIENT ADMISSION DATE: 5/13/25  8:23 PM   DISCHARGE DATE: 5/18/2025 11:41 AM   DISPOSITION:Home/Self Care    Network Utilization Review Department  ATTENTION: Please call with any questions or concerns to [phone] and carefully listen to the prompts so that you are directed to the right person. All voicemails are confidential.   For Discharge needs, contact Care Management DC Support Team at [phone] opt. 2  Send all requests for admission clinical reviews, approved or denied determinations and any other requests to dedicated fax number below belonging to the campus where the patient is receiving treatment. List of dedicated fax numbers for the Facilities:  FACILITY NAME UR FAX NUMBER   ADMISSION DENIALS (Administrative/Medical Necessity) [phone]   DISCHARGE SUPPORT TEAM ([facility]) [phone]   PARENT CHILD HEALTH (Maternity/NICU/Pediatrics) [phone]   [facility] [phone]   [facility] [phone]   [facility] [phone]   [facility] [phone]   [facility] [phone]   [facility] [phone]   [facility] [phone]   [facility] [phone]   [facility]  [facility] [phone]   [facility] [phone]   [facility] [phone]   [facility] [phone]

## 2025-05-19 NOTE — TELEPHONE ENCOUNTER
"Patient was returning a call to the office. Per the previous encounter, reviewed the info with the patient    Patient stated \"Feeling great consider everything. He thought it was going to be worse but has gotten better. Zero complaints.\"    He has had multiple bowel movements.    Advised to take medications as prescribed. No strenuous activities. And to watch for the signs of infection, increase blood in urine, clots or catheter not draining.     Patient confirmed understanding all.     Wife then reviewed the appts. She was concerned why is comes up in [facility] as procedure. Explained the TOV appts and why 2 appts why needed. She confirmed understanding.     Confirmed all 3 appts.   "

## 2025-05-27 NOTE — ANESTHESIA POSTPROCEDURE EVALUATION
Post-Op Assessment Note    CV Status:  Stable    Pain management: adequate       Mental Status:  Alert and awake   Hydration Status:  Euvolemic   PONV Controlled:  Controlled   Airway Patency:  Patent     Post Op Vitals Reviewed: Yes    No anethesia notable event occurred.    Staff: Anesthesiologist           Last Filed PACU Vitals:  Vitals Value Taken Time   Temp 99 °F (37.2 °C) 05/11/25 12:00   Pulse 94 05/11/25 12:00   /61 05/11/25 12:00   Resp 22 05/11/25 12:00   SpO2 95 % 05/11/25 12:00       Modified Ruddy:     Vitals Value Taken Time   Activity 2 05/11/25 12:00   Respiration 2 05/11/25 12:00   Circulation 2 05/11/25 12:00   Consciousness 2 05/11/25 12:00   Oxygen Saturation 2 05/11/25 12:00     Modified Ruddy Score: 10

## 2025-05-29 ENCOUNTER — PROCEDURE VISIT (OUTPATIENT)
Dept: UROLOGY | Facility: CLINIC | Age: 60
End: 2025-05-29

## 2025-05-29 ENCOUNTER — TELEPHONE (OUTPATIENT)
Dept: UROLOGY | Facility: CLINIC | Age: 60
End: 2025-05-29

## 2025-05-29 VITALS
TEMPERATURE: 98.2 F | BODY MASS INDEX: 27.5 KG/M2 | OXYGEN SATURATION: 100 % | HEIGHT: 67 IN | SYSTOLIC BLOOD PRESSURE: 142 MMHG | DIASTOLIC BLOOD PRESSURE: 92 MMHG | WEIGHT: 175.2 LBS | HEART RATE: 125 BPM

## 2025-05-29 DIAGNOSIS — C68.9 UROTHELIAL CANCER (HCC): Primary | ICD-10-CM

## 2025-05-29 PROCEDURE — 99024 POSTOP FOLLOW-UP VISIT: CPT

## 2025-05-29 NOTE — PROGRESS NOTES
"5/29/2025    Jw Ray  1965  [id number]    Diagnosis  Chief Complaint    void trial         Patient presents for void trial managed by Dr. Santacruz    Plan  RTO IF NO VOID THIS AFTERNOON    Procedure Brewer removal/voiding trial -Instilled 100ml of sterile water without difficulty. Deflated balloon, pulled 24Fr 10cc latex brewer, no bleeding or discomfort noted.     Patient tolerated well. Encouraged patient to hydrate well and return this afternoon for post void residual.  He knows he may return early if uncomfortable and unable to urinate. Patient agrees to this plan. Called pt at 2pm he reports voiding without difficulty. Intake = output.         No results found for this or any previous visit (from the past 4 hours).          Bladder instillation     Date/Time  5/29/2025 9:00 AM     Performed by  Kassie Crowder LPN   Authorized by  Marbin Santacruz MD     [place] Protocol   procedure performed by consultantConsent: Verbal consent obtained  Consent given by: patient  Patient understanding: patient states understanding of the procedure being performed  Patient identity confirmed: verbally with patient      Local anesthesia used: no     Anesthesia   Local anesthesia used: no     Sedation   Patient sedated: no        Specimen: no    Culture: no   Procedure Details   Patient tolerance: patient tolerated the procedure well with no immediate complications            Vitals:    05/29/25 0908   BP: 142/92   Pulse: (!) 125   Temp: 98.2 °F (36.8 °C)   SpO2: 100%   Weight: 79.5 kg (175 lb 3.2 oz)   Height: 5' 7\" (1.702 m)           Kassie Crowder LPN       "

## 2025-05-29 NOTE — TELEPHONE ENCOUNTER
Tried calling pt to see if he was voiding after void trial this am. He is scheduled to come back to office this afternoon for pvr. Will try him again shortly. Spoke with pt at 2:16pm, he is voiding well without difficulty. Does not want to come to office today for pvr. He knows to call with any questions or concerns.

## 2025-06-02 ENCOUNTER — OFFICE VISIT (OUTPATIENT)
Dept: UROLOGY | Facility: CLINIC | Age: 60
End: 2025-06-02

## 2025-06-02 VITALS
HEART RATE: 105 BPM | SYSTOLIC BLOOD PRESSURE: 138 MMHG | BODY MASS INDEX: 28.25 KG/M2 | DIASTOLIC BLOOD PRESSURE: 80 MMHG | WEIGHT: 180 LBS | OXYGEN SATURATION: 98 % | HEIGHT: 67 IN

## 2025-06-02 DIAGNOSIS — C68.9 UROTHELIAL CANCER (HCC): Primary | ICD-10-CM

## 2025-06-02 PROCEDURE — 99024 POSTOP FOLLOW-UP VISIT: CPT | Performed by: UROLOGY

## 2025-06-02 RX ORDER — BLOOD-GLUCOSE METER
EACH MISCELLANEOUS
COMMUNITY
Start: 2025-05-19

## 2025-06-02 NOTE — ASSESSMENT & PLAN NOTE
Patient underwent left nephroureterectomy urgently for upper tract urothelial cancer associated hematuria and include blood loss anemia.  He is doing well now.  Pathology shows high-grade disease without invasion.  There still may be a role for adjuvant therapy and I would like him to meet with oncology to discuss chemotherapy versus immunotherapy but he wishes to hold off.  He says he will discuss with his cousin who is a nurse and contact us if he wants to consider.    Otherwise plan for surveillance CT scan in 3 to 4 months with BMP prior and cystoscopy at the same time

## 2025-06-02 NOTE — PROGRESS NOTES
"Patient ID: DENNIS Gutierrez Jr. is a 56 y.o. male who presents for Annual Exam (Follow up blood work).    /76   Pulse 83   Ht 1.715 m (5' 7.5\")   Wt 106 kg (232 lb 9.6 oz)   SpO2 100%   BMI 35.89 kg/m²     Diabetes  He presents for his follow-up diabetic visit. He has type 2 diabetes mellitus. Onset time: FEW YEARS. There are no hypoglycemic associated symptoms. There are no diabetic associated symptoms. Pertinent negatives for diabetes include no blurred vision, no chest pain, no fatigue, no polydipsia, no polyphagia and no visual change. There are no hypoglycemic complications. Symptoms are worsening. Diabetic complications include nephropathy. Pertinent negatives for diabetic complications include no CVA or heart disease. Risk factors for coronary artery disease include hypertension, male sex, dyslipidemia and diabetes mellitus. Current diabetic treatment includes oral agent (dual therapy). He is compliant with treatment most of the time. He is following a generally unhealthy diet. When asked about meal planning, he reported none. He has not had a previous visit with a dietitian. He participates in exercise intermittently. An ACE inhibitor/angiotensin II receptor blocker is being taken. He does not see a podiatrist.Eye exam is current.   Hypertension  This is a chronic problem. The current episode started more than 1 year ago. The problem has been gradually worsening since onset. The problem is uncontrolled. Pertinent negatives include no blurred vision, chest pain, orthopnea, palpitations, peripheral edema, PND or shortness of breath. There are no associated agents to hypertension. Risk factors for coronary artery disease include male gender, obesity and diabetes mellitus. Past treatments include ACE inhibitors and calcium channel blockers. The current treatment provides mild improvement. There are no compliance problems.  Hypertensive end-organ damage includes kidney disease. There is no history of " Assessment/Plan:    Urothelial cancer (HCC)  Patient underwent left nephroureterectomy urgently for upper tract urothelial cancer associated hematuria and include blood loss anemia.  He is doing well now.  Pathology shows high-grade disease without invasion.  There still may be a role for adjuvant therapy and I would like him to meet with oncology to discuss chemotherapy versus immunotherapy but he wishes to hold off.  He says he will discuss with his cousin who is a nurse and contact us if he wants to consider.    Otherwise plan for surveillance CT scan in 3 to 4 months with BMP prior and cystoscopy at the same time          Subjective:      Patient ID: Jw Ray is a 59 y.o. male.    HPI   59 y.o. male with a past medical history of hypertension type 2 diabetes, CAD status post PCI, CHF who was admitted for gross hematuria in May 2025 found to have large left upper tract tumor and elected for upfront nephroureterectomy with pathology showing a high-grade disease.      The patient presented with hematuria associated acute blood loss anemia May 11, 2025 with CT scan showing left-sided hydronephrosis containing high density material and bladder with clot resulting in going to the operating for clot evacuation and left uteroscopy which showed a large volume of tumor over taking the left upper pole with biopsies performed returning high-grade urothelial cancer.  We she was seen in hospital by oncology and role for neoadjuvant chemotherapy last which he did not want.  Because of ongoing hematuria he underwent left nephroureterectomy on May 14, 2025 with pathology showing high-grade disease of the left upper pole without invasion and negative margins and negative lymph nodes.    He now returns for follow-up.  He says he doing very well.  No pain complaints.  His catheter was removed 5 days ago.  Urine is clear.    Final Diagnosis   A.  Periaortic lymph nodes (dissection):     - Three (3) lymph nodes, negative  "for carcinoma.      B.  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     - Surgical margin negative for tumor.       Past Surgical History[1]     Past Medical History[2]          Review of Systems   Constitutional:  Negative for chills and fever.   HENT:  Negative for ear pain and sore throat.    Eyes:  Negative for pain and visual disturbance.   Respiratory:  Negative for cough and shortness of breath.    Cardiovascular:  Negative for chest pain and palpitations.   Gastrointestinal:  Negative for abdominal pain and vomiting.   Genitourinary:  Negative for dysuria and hematuria.   Musculoskeletal:  Negative for arthralgias and back pain.   Skin:  Negative for color change and rash.   Neurological:  Negative for seizures and syncope.   All other systems reviewed and are negative.        Objective:      /80 (BP Location: Left arm, Patient Position: Sitting, Cuff Size: Standard)   Pulse 105   Ht 5' 7\" (1.702 m)   Wt 81.6 kg (180 lb)   SpO2 98%   BMI 28.19 kg/m²     No results found for: \"PSA\"       Physical Exam  Vitals reviewed.   Constitutional:       Appearance: Normal appearance. He is normal weight.   HENT:      Head: Normocephalic and atraumatic.     Eyes:      Pupils: Pupils are equal, round, and reactive to light.     Abdominal:      General: Abdomen is flat.      Comments: Incisions are healing well.  Clean dry and intact.  No obvious signs of hernia there is a mild bulge around his left lower quadrant incision.     Neurological:      General: No focal deficit present.      Mental Status: He is alert and oriented to person, place, and time.     Psychiatric:         Mood and Affect: Mood normal.         Thought Content: Thought content normal.           Orders  Orders Placed This Encounter   Procedures    CT abdomen pelvis w wo contrast     Standing Status:   Future " angina, CAD/MI, CVA, heart failure or left ventricular hypertrophy. There is no history of chronic renal disease or sleep apnea.       Subjective     Review of Systems   Constitutional: Negative.  Negative for fatigue.   Eyes:  Negative for blurred vision.   Respiratory:  Negative for shortness of breath.    Cardiovascular:  Negative for chest pain, palpitations, orthopnea and PND.   Endocrine: Negative for polydipsia and polyphagia.   All other systems reviewed and are negative.      Objective     Physical Exam  Vitals and nursing note reviewed.   Constitutional:       Appearance: He is obese.   Cardiovascular:      Rate and Rhythm: Normal rate and regular rhythm.      Pulses: Normal pulses.      Heart sounds: Normal heart sounds. No murmur heard.  Pulmonary:      Effort: Pulmonary effort is normal.      Breath sounds: Normal breath sounds.   Musculoskeletal:      Right lower leg: No edema.      Left lower leg: No edema.   Skin:     Capillary Refill: Capillary refill takes more than 3 seconds.   Neurological:      General: No focal deficit present.      Mental Status: He is oriented to person, place, and time. Mental status is at baseline.   Psychiatric:         Mood and Affect: Mood normal.         Behavior: Behavior normal.         Thought Content: Thought content normal.         Judgment: Judgment normal.         Lab Results   Component Value Date    WBC 7.3 07/25/2022    HGB 14.0 07/25/2022    HCT 40.4 (L) 07/25/2022    MCV 93 07/25/2022     07/25/2022           Problem List Items Addressed This Visit       Follow-up exam - Primary    Type 2 diabetes mellitus without complication, without long-term current use of insulin (CMS/[facility])    Relevant Orders    Referral to Nutrition Services    Class 2 severe obesity due to excess calories with serious comorbidity and body mass index (BMI) of 35.0 to 35.9 in adult (CMS/[facility])    Relevant Orders    Referral to Nutrition Services    Primary hypertension    Relevant      Expected Date:   9/23/2025     Expiration Date:   6/2/2029     What is the patient's sedation requirement?:   No Sedation     Contrast information::   IV     Has the patient ever had a reaction to CT contrast?:   No     Release to patient through Mychart:   Immediate     Reason for Exam:   hx of left nephrouretectomy surgery for urothelial cancer. evaluate for recurrence    Basic metabolic panel     This is a patient instruction: Patient fasting for 8 hours or longer recommended.     Standing Status:   Future     Expected Date:   9/2/2025     Expiration Date:   6/2/2026          [1]   Past Surgical History:  Procedure Laterality Date    CARDIAC SURGERY      FL RETROGRADE PYELOGRAM  5/11/2025    ME CYSTO W/IRRIG & EVAC MULTPLE OBSTRUCTING CLOTS Bilateral 5/11/2025    Procedure: CYSTOSCOPY EVACUATION OF CLOTS, Left Ureterscopy and biopsy, Left Retrograde and Left Stent. Right Retrograde Pyelogram;  Surgeon: Marbin Santacruz MD;  Location: [facility];  Service: Urology    ME LAPAROSCOPY NEPHRECTOMY W/TOTAL URETERECTOMY Left 5/14/2025    Procedure: NEPHRO-URETERECTOMY LAPAROSCOPIC W/ ROBOTIC, LYMPH NODE DISSECTION;  Surgeon: Marbin Santacruz MD;  Location: [facility];  Service: Urology   [2]   Past Medical History:  Diagnosis Date    CHF (congestive heart failure) (HCC)     DM (diabetes mellitus) (HCC)     HTN (hypertension)     Urothelial cancer (HCC)       Medications    losartan (Cozaar) 25 mg tablet    Other Relevant Orders    Referral to Nutrition Services    Both eyes affected by mild nonproliferative diabetic retinopathy with macular edema, associated with type 2 diabetes mellitus (CMS/[facility])    Relevant Orders    Referral to Endocrinology    Obstructive sleep apnea    Hypertension, uncontrolled     Other Visit Diagnoses       Screening, ischemic heart disease        Relevant Orders    CT cardiac scoring wo IV contrast    Diabetes mellitus due to underlying condition with microalbuminuria, without long-term current use of insulin (CMS/[facility])        Relevant Orders    Referral to Endocrinology    Referral to Nephrology                   A/P          DC LISINOPRIL   START LOSARTAN 25MG 1 PILL EVERY DAY FILLED   REFFERAL NEPHROLOGY   REFFERAL ENDOCRINOLOGY   REFFERAL NUTRITION   CARDIAC CT   Discussed the effect of morbid obesity and overall all cause mortality  Discussed the effect of morbid obesity and physical mental wellbeing  Discussed the effect of morbid obesity on cardiovascular and cerebrovascular health  Discussed the effect of morbid obesity and cancer/malignancy  Discussed the effect of morbid obesity and chronic low back pain hip pain knee pain  Discussed the effect of morbid obesity and hypertension, DIABETES , obstructive sleep apnea, fatal arrhythmias and sudden  We will see him in a month time to recheck his blood pressure

## (undated) DEVICE — Device

## (undated) DEVICE — GLOVE SRG BIOGEL ECLIPSE 7.5

## (undated) DEVICE — HEM-O-LOK CLIP CARTRIDGE LARGE DA VINCI SI/XI 6CLIPS/EA

## (undated) DEVICE — BASKET SPECIMEN RETRIVAL 1.9FR 120CM

## (undated) DEVICE — VESSEL SEALER EXTEND: Brand: ENDOWRIST

## (undated) DEVICE — TRAY FOLEY 16FR URIMETER SURESTEP

## (undated) DEVICE — SYRINGE 10ML LL

## (undated) DEVICE — PACK TUR

## (undated) DEVICE — SPECIMEN CONTAINER STERILE PEEL PACK

## (undated) DEVICE — SUT VICRYL PLUS 0 54IN VCP287G

## (undated) DEVICE — SUT VICRYL 0 UR-6 27 IN J603H

## (undated) DEVICE — SUT VICRYL PLUS 0 UR-6 27IN VCP603H

## (undated) DEVICE — CATH URET .038 10FR 50CM DUAL LUMEN

## (undated) DEVICE — SYRINGE CATH TIP 50ML

## (undated) DEVICE — PREMIUM DRY TRAY LF: Brand: MEDLINE INDUSTRIES, INC.

## (undated) DEVICE — SUREFORM 45: Brand: SUREFORM

## (undated) DEVICE — REM POLYHESIVE ADULT PATIENT RETURN ELECTRODE: Brand: VALLEYLAB

## (undated) DEVICE — TIP COVER ACCESSORY

## (undated) DEVICE — TISSUE RETRIEVAL SYSTEM: Brand: INZII RETRIEVAL SYSTEM

## (undated) DEVICE — TROCAR PORT ACCESS 12 X120MM W/BLDLS OPTICAL TIP AIRSEAL

## (undated) DEVICE — EVACUATOR BLADDER ELLIK DISP STRL

## (undated) DEVICE — SUT PDS II 0 CT-1 27 IN Z340H

## (undated) DEVICE — SUREFORM 45 RELOAD WHITE: Brand: SUREFORM

## (undated) DEVICE — CHLORHEXIDINE 4PCT 4 OZ

## (undated) DEVICE — VALVE SUCTION-IRR 2.5MM ADJ UROSEAL

## (undated) DEVICE — 2, DISPOSABLE SUCTION/IRRIGATOR WITHOUT DISPOSABLE TIP: Brand: STRYKEFLOW

## (undated) DEVICE — EXOFIN PRECISION PEN HIGH VISCOSITY TOPICAL SKIN ADHESIVE: Brand: EXOFIN PRECISION PEN, 1G

## (undated) DEVICE — 3M™ IOBAN™ 2 ANTIMICROBIAL INCISE DRAPE 6650EZ: Brand: IOBAN™ 2

## (undated) DEVICE — INTENDED FOR TISSUE SEPARATION, AND OTHER PROCEDURES THAT REQUIRE A SHARP SURGICAL BLADE TO PUNCTURE OR CUT.: Brand: BARD-PARKER SAFETY BLADES SIZE 11, STERILE

## (undated) DEVICE — MONOPOLAR CURVED SCISSORS: Brand: ENDOWRIST;DAVINCI SI

## (undated) DEVICE — SHEATH URETERAL ACCESS 10/12FR 35CM PROXIS

## (undated) DEVICE — BASIC SINGLE BASIN 2-LF: Brand: MEDLINE INDUSTRIES, INC.

## (undated) DEVICE — VISUALIZATION SYSTEM: Brand: CLEARIFY

## (undated) DEVICE — BETHLEHEM MAJOR GENERAL PACK: Brand: CARDINAL HEALTH

## (undated) DEVICE — TROCAR: Brand: KII SLEEVE

## (undated) DEVICE — GLOVE INDICATOR PI UNDERGLOVE SZ 8 BLUE

## (undated) DEVICE — BAG URINE DRAINAGE 2000ML ANTI RFLX LF

## (undated) DEVICE — SEAL

## (undated) DEVICE — INSUFFLATION NEEDLE TO ESTABLISH PNEUMOPERITONEUM.: Brand: INSUFFLATION NEEDLE

## (undated) DEVICE — PENCILETTE PUSH BUTTON COATED

## (undated) DEVICE — PROGRASP FORCEPS: Brand: ENDOWRIST

## (undated) DEVICE — INTENDED FOR TISSUE SEPARATION, AND OTHER PROCEDURES THAT REQUIRE A SHARP SURGICAL BLADE TO PUNCTURE OR CUT.: Brand: BARD-PARKER SAFETY BLADES SIZE 15, STERILE

## (undated) DEVICE — LARGE NEEDLE DRIVER: Brand: ENDOWRIST;DAVINCI SI

## (undated) DEVICE — ELECTRO LUBE IS A SINGLE PATIENT USE DEVICE THAT IS INTENDED TO BE USED ON ELECTROSURGICAL ELECTRODES TO REDUCE STICKING.: Brand: KEY SURGICAL ELECTRO LUBE

## (undated) DEVICE — DRAPE SHEET X-LG

## (undated) DEVICE — GUIDEWIRE STRGHT TIP 0.035 IN  SOLO PLUS

## (undated) DEVICE — SUT PROLENE 4-0 RB1 36 IN 8357H

## (undated) DEVICE — SINGLE-USE DIGITAL FLEXIBLE URETEROSCOPE: Brand: APTRA

## (undated) DEVICE — FENESTRATED BIPOLAR FORCEPS: Brand: ENDOWRIST

## (undated) DEVICE — NEEDLE 25G X 1 1/2

## (undated) DEVICE — ARM DRAPE

## (undated) DEVICE — CATH URETERAL 5FR X 70 CM FLEX TIP POLYUR BARD

## (undated) DEVICE — AIRSEAL TUBE SMOKE EVAC LUMENX3 FILTERED

## (undated) DEVICE — SUT MONOCRYL 4-0 PS-2 27 IN Y426H

## (undated) DEVICE — JP CHANNEL DRAIN 19FR, FULL FLUTES: Brand: JACKSON-PRATT

## (undated) DEVICE — SUT VICRYL 0 CT-1 27 IN J260H